# Patient Record
Sex: MALE | Race: WHITE | Employment: UNEMPLOYED | URBAN - METROPOLITAN AREA
[De-identification: names, ages, dates, MRNs, and addresses within clinical notes are randomized per-mention and may not be internally consistent; named-entity substitution may affect disease eponyms.]

---

## 2017-02-08 ENCOUNTER — HOSPITAL ENCOUNTER (OUTPATIENT)
Age: 77
Discharge: HOME | End: 2017-02-08
Payer: MEDICARE

## 2017-02-08 LAB
INR BLD: 4.1
PROTHROMBIN TIME: 46.3 SEC (ref 9.7–12)

## 2017-02-08 PROCEDURE — 85610 PROTHROMBIN TIME: CPT

## 2017-02-08 PROCEDURE — 36415 COLL VENOUS BLD VENIPUNCTURE: CPT

## 2017-02-17 ENCOUNTER — HOSPITAL ENCOUNTER (OUTPATIENT)
Age: 77
Discharge: HOME | End: 2017-02-17
Payer: MEDICARE

## 2017-02-17 LAB
INR BLD: 3.9
PROTHROMBIN TIME: 43.7 SEC (ref 9.7–12)

## 2017-02-17 PROCEDURE — 36415 COLL VENOUS BLD VENIPUNCTURE: CPT

## 2017-02-17 PROCEDURE — 85610 PROTHROMBIN TIME: CPT

## 2017-02-27 ENCOUNTER — HOSPITAL ENCOUNTER (OUTPATIENT)
Age: 77
Discharge: HOME | End: 2017-02-27
Payer: MEDICARE

## 2017-02-27 LAB
INR BLD: 1.9
PROTHROMBIN TIME: 21.1 SEC (ref 9.7–12)

## 2017-02-27 PROCEDURE — 36415 COLL VENOUS BLD VENIPUNCTURE: CPT

## 2017-02-27 PROCEDURE — 85610 PROTHROMBIN TIME: CPT

## 2017-03-13 ENCOUNTER — HOSPITAL ENCOUNTER (OUTPATIENT)
Age: 77
Discharge: HOME | End: 2017-03-13
Payer: MEDICARE

## 2017-03-13 LAB
INR BLD: 3.8
PROTHROMBIN TIME: 41.8 SEC (ref 9.7–12)

## 2017-03-13 PROCEDURE — 85610 PROTHROMBIN TIME: CPT

## 2017-03-13 PROCEDURE — 36415 COLL VENOUS BLD VENIPUNCTURE: CPT

## 2017-03-27 ENCOUNTER — HOSPITAL ENCOUNTER (OUTPATIENT)
Age: 77
Discharge: HOME | End: 2017-03-27
Payer: MEDICARE

## 2017-03-27 LAB
INR BLD: 3
PROTHROMBIN TIME: 32.6 SEC (ref 9.7–12)

## 2017-03-27 PROCEDURE — 36415 COLL VENOUS BLD VENIPUNCTURE: CPT

## 2017-03-27 PROCEDURE — 85610 PROTHROMBIN TIME: CPT

## 2017-04-25 ENCOUNTER — HOSPITAL ENCOUNTER (OUTPATIENT)
Age: 77
Discharge: HOME | End: 2017-04-25
Payer: MEDICARE

## 2017-04-25 LAB
ALBUMIN SERPL-MCNC: 4.4 G/DL (ref 3.5–5.2)
ALBUMIN/GLOBULIN RATIO: NORMAL (ref 1–2.5)
ALP BLD-CCNC: 100 U/L (ref 40–129)
ALT SERPL-CCNC: 22 U/L (ref 5–41)
ANION GAP SERPL CALCULATED.3IONS-SCNC: 11 MMOL/L (ref 9–17)
AST SERPL-CCNC: 22 U/L
BILIRUB SERPL-MCNC: 0.63 MG/DL (ref 0.3–1.2)
BILIRUBIN DIRECT: 0.14 MG/DL
BILIRUBIN, INDIRECT: 0.49 MG/DL (ref 0–1)
BUN BLDV-MCNC: 24 MG/DL (ref 8–23)
CHLORIDE BLD-SCNC: 107 MMOL/L (ref 98–107)
CHOLESTEROL/HDL RATIO: 2.8
CHOLESTEROL: 132 MG/DL
CO2: 27 MMOL/L (ref 20–31)
CREAT SERPL-MCNC: 0.74 MG/DL (ref 0.7–1.2)
ESTIMATED AVERAGE GLUCOSE: 97 MG/DL
GFR AFRICAN AMERICAN: >60 ML/MIN
GFR NON-AFRICAN AMERICAN: >60 ML/MIN
GFR SERPL CREATININE-BSD FRML MDRD: ABNORMAL ML/MIN/{1.73_M2}
GFR SERPL CREATININE-BSD FRML MDRD: ABNORMAL ML/MIN/{1.73_M2}
GLOBULIN: NORMAL G/DL (ref 1.5–3.8)
HBA1C MFR BLD: 5 % (ref 4–6)
HCT VFR BLD CALC: 47.6 % (ref 41–53)
HDLC SERPL-MCNC: 48 MG/DL
HEMOGLOBIN: 15.2 G/DL (ref 13.5–17.5)
INR BLD: 2.8
LDL CHOLESTEROL: 63 MG/DL (ref 0–130)
MCH RBC QN AUTO: 27.7 PG (ref 26–34)
MCHC RBC AUTO-ENTMCNC: 31.9 G/DL (ref 31–37)
MCV RBC AUTO: 86.8 FL (ref 80–100)
PDW BLD-RTO: 13.3 % (ref 11.5–14.9)
PLATELET # BLD: 163 K/UL (ref 150–450)
PMV BLD AUTO: 8.5 FL (ref 6–12)
POTASSIUM SERPL-SCNC: 4.6 MMOL/L (ref 3.7–5.3)
PROSTATE SPECIFIC ANTIGEN: 1.9 UG/L
PROTHROMBIN TIME: 31.9 SEC (ref 9.7–12)
RBC # BLD: 5.48 M/UL (ref 4.5–5.9)
SODIUM BLD-SCNC: 145 MMOL/L (ref 135–144)
TOTAL PROTEIN: 7.7 G/DL (ref 6.4–8.3)
TRIGL SERPL-MCNC: 104 MG/DL
VLDLC SERPL CALC-MCNC: NORMAL MG/DL (ref 1–30)
WBC # BLD: 6.3 K/UL (ref 3.5–11)

## 2017-04-25 PROCEDURE — 80051 ELECTROLYTE PANEL: CPT

## 2017-04-25 PROCEDURE — 36415 COLL VENOUS BLD VENIPUNCTURE: CPT

## 2017-04-25 PROCEDURE — 82565 ASSAY OF CREATININE: CPT

## 2017-04-25 PROCEDURE — 85610 PROTHROMBIN TIME: CPT

## 2017-04-25 PROCEDURE — 85027 COMPLETE CBC AUTOMATED: CPT

## 2017-04-25 PROCEDURE — 80061 LIPID PANEL: CPT

## 2017-04-25 PROCEDURE — 84520 ASSAY OF UREA NITROGEN: CPT

## 2017-04-25 PROCEDURE — 83036 HEMOGLOBIN GLYCOSYLATED A1C: CPT

## 2017-04-25 PROCEDURE — 80076 HEPATIC FUNCTION PANEL: CPT

## 2017-04-25 PROCEDURE — G0103 PSA SCREENING: HCPCS

## 2017-05-23 ENCOUNTER — HOSPITAL ENCOUNTER (OUTPATIENT)
Age: 77
Discharge: HOME | End: 2017-05-23
Payer: MEDICARE

## 2017-05-23 ENCOUNTER — HOSPITAL ENCOUNTER (OUTPATIENT)
Dept: GENERAL RADIOLOGY | Age: 77
Discharge: HOME | End: 2017-05-23
Payer: MEDICARE

## 2017-05-23 DIAGNOSIS — M25.552 LEFT HIP PAIN: ICD-10-CM

## 2017-05-23 LAB
INR BLD: 2.3
PROTHROMBIN TIME: 26.1 SEC (ref 9.7–12)

## 2017-05-23 PROCEDURE — 73502 X-RAY EXAM HIP UNI 2-3 VIEWS: CPT

## 2017-05-23 PROCEDURE — 36415 COLL VENOUS BLD VENIPUNCTURE: CPT

## 2017-05-23 PROCEDURE — 85610 PROTHROMBIN TIME: CPT

## 2017-06-20 ENCOUNTER — HOSPITAL ENCOUNTER (OUTPATIENT)
Age: 77
Discharge: HOME | End: 2017-06-20
Payer: MEDICARE

## 2017-06-20 LAB
INR BLD: 1.6
PROTHROMBIN TIME: 17 SEC (ref 9.7–12)

## 2017-06-20 PROCEDURE — 85610 PROTHROMBIN TIME: CPT

## 2017-06-20 PROCEDURE — 36415 COLL VENOUS BLD VENIPUNCTURE: CPT

## 2017-07-05 ENCOUNTER — HOSPITAL ENCOUNTER (OUTPATIENT)
Age: 77
Discharge: HOME | End: 2017-07-05
Payer: MEDICARE

## 2017-07-05 LAB
INR BLD: 2.1
PROTHROMBIN TIME: 23.4 SEC (ref 9.7–12)

## 2017-07-05 PROCEDURE — 36415 COLL VENOUS BLD VENIPUNCTURE: CPT

## 2017-07-05 PROCEDURE — 85610 PROTHROMBIN TIME: CPT

## 2017-08-02 ENCOUNTER — HOSPITAL ENCOUNTER (OUTPATIENT)
Age: 77
Discharge: HOME | End: 2017-08-02
Payer: MEDICARE

## 2017-08-02 LAB
ALBUMIN SERPL-MCNC: 4.5 G/DL (ref 3.5–5.2)
ALBUMIN/GLOBULIN RATIO: NORMAL (ref 1–2.5)
ALP BLD-CCNC: 80 U/L (ref 40–129)
ALT SERPL-CCNC: 21 U/L (ref 5–41)
ANION GAP SERPL CALCULATED.3IONS-SCNC: 12 MMOL/L (ref 9–17)
AST SERPL-CCNC: 21 U/L
BILIRUB SERPL-MCNC: 0.67 MG/DL (ref 0.3–1.2)
BILIRUBIN DIRECT: 0.14 MG/DL
BILIRUBIN, INDIRECT: 0.53 MG/DL (ref 0–1)
BUN BLDV-MCNC: 23 MG/DL (ref 8–23)
CHLORIDE BLD-SCNC: 104 MMOL/L (ref 98–107)
CHOLESTEROL/HDL RATIO: 2.7
CHOLESTEROL: 125 MG/DL
CO2: 26 MMOL/L (ref 20–31)
CREAT SERPL-MCNC: 0.75 MG/DL (ref 0.7–1.2)
ESTIMATED AVERAGE GLUCOSE: 114 MG/DL
GFR AFRICAN AMERICAN: >60 ML/MIN
GFR NON-AFRICAN AMERICAN: >60 ML/MIN
GFR SERPL CREATININE-BSD FRML MDRD: NORMAL ML/MIN/{1.73_M2}
GFR SERPL CREATININE-BSD FRML MDRD: NORMAL ML/MIN/{1.73_M2}
GLOBULIN: NORMAL G/DL (ref 1.5–3.8)
GLUCOSE BLD-MCNC: 95 MG/DL (ref 70–99)
HBA1C MFR BLD: 5.6 % (ref 4–6)
HCT VFR BLD CALC: 48.2 % (ref 41–53)
HDLC SERPL-MCNC: 46 MG/DL
HEMOGLOBIN: 15.8 G/DL (ref 13.5–17.5)
INR BLD: 2
LDL CHOLESTEROL: 55 MG/DL (ref 0–130)
MCH RBC QN AUTO: 29.1 PG (ref 26–34)
MCHC RBC AUTO-ENTMCNC: 32.7 G/DL (ref 31–37)
MCV RBC AUTO: 89.2 FL (ref 80–100)
PDW BLD-RTO: 14.9 % (ref 11.5–14.9)
PLATELET # BLD: 153 K/UL (ref 150–450)
PMV BLD AUTO: 8.5 FL (ref 6–12)
POTASSIUM SERPL-SCNC: 4.6 MMOL/L (ref 3.7–5.3)
PROTHROMBIN TIME: 22 SEC (ref 9.7–12)
RBC # BLD: 5.41 M/UL (ref 4.5–5.9)
SODIUM BLD-SCNC: 142 MMOL/L (ref 135–144)
TOTAL PROTEIN: 7.6 G/DL (ref 6.4–8.3)
TRIGL SERPL-MCNC: 121 MG/DL
VLDLC SERPL CALC-MCNC: NORMAL MG/DL (ref 1–30)
WBC # BLD: 7.5 K/UL (ref 3.5–11)

## 2017-08-02 PROCEDURE — 82947 ASSAY GLUCOSE BLOOD QUANT: CPT

## 2017-08-02 PROCEDURE — 85027 COMPLETE CBC AUTOMATED: CPT

## 2017-08-02 PROCEDURE — 82565 ASSAY OF CREATININE: CPT

## 2017-08-02 PROCEDURE — 84520 ASSAY OF UREA NITROGEN: CPT

## 2017-08-02 PROCEDURE — 80061 LIPID PANEL: CPT

## 2017-08-02 PROCEDURE — 85610 PROTHROMBIN TIME: CPT

## 2017-08-02 PROCEDURE — 80076 HEPATIC FUNCTION PANEL: CPT

## 2017-08-02 PROCEDURE — 36415 COLL VENOUS BLD VENIPUNCTURE: CPT

## 2017-08-02 PROCEDURE — 80051 ELECTROLYTE PANEL: CPT

## 2017-08-02 PROCEDURE — 83036 HEMOGLOBIN GLYCOSYLATED A1C: CPT

## 2017-08-31 ENCOUNTER — HOSPITAL ENCOUNTER (OUTPATIENT)
Age: 77
Discharge: HOME | End: 2017-08-31
Payer: MEDICARE

## 2017-08-31 LAB
INR BLD: 2
PROTHROMBIN TIME: 22.1 SEC (ref 9.7–12)

## 2017-08-31 PROCEDURE — 85610 PROTHROMBIN TIME: CPT

## 2017-08-31 PROCEDURE — 36415 COLL VENOUS BLD VENIPUNCTURE: CPT

## 2017-09-29 ENCOUNTER — HOSPITAL ENCOUNTER (OUTPATIENT)
Age: 77
Discharge: HOME | End: 2017-09-29
Payer: MEDICARE

## 2017-09-29 LAB
INR BLD: 2.2
PROTHROMBIN TIME: 24.8 SEC (ref 9.7–12)

## 2017-09-29 PROCEDURE — 36415 COLL VENOUS BLD VENIPUNCTURE: CPT

## 2017-09-29 PROCEDURE — 85610 PROTHROMBIN TIME: CPT

## 2017-10-30 ENCOUNTER — HOSPITAL ENCOUNTER (OUTPATIENT)
Age: 77
Discharge: HOME | End: 2017-10-30
Payer: MEDICARE

## 2017-10-30 LAB
ALBUMIN SERPL-MCNC: 4.5 G/DL (ref 3.5–5.2)
ALBUMIN/GLOBULIN RATIO: NORMAL (ref 1–2.5)
ALP BLD-CCNC: 76 U/L (ref 40–129)
ALT SERPL-CCNC: 19 U/L (ref 5–41)
ANION GAP SERPL CALCULATED.3IONS-SCNC: 10 MMOL/L (ref 9–17)
AST SERPL-CCNC: 21 U/L
BILIRUB SERPL-MCNC: 0.6 MG/DL (ref 0.3–1.2)
BILIRUBIN DIRECT: 0.14 MG/DL
BILIRUBIN, INDIRECT: 0.46 MG/DL (ref 0–1)
BUN BLDV-MCNC: 21 MG/DL (ref 8–23)
CHLORIDE BLD-SCNC: 104 MMOL/L (ref 98–107)
CHOLESTEROL/HDL RATIO: 2.7
CHOLESTEROL: 135 MG/DL
CO2: 28 MMOL/L (ref 20–31)
CREAT SERPL-MCNC: 0.79 MG/DL (ref 0.7–1.2)
ESTIMATED AVERAGE GLUCOSE: 100 MG/DL
GFR AFRICAN AMERICAN: >60 ML/MIN
GFR NON-AFRICAN AMERICAN: >60 ML/MIN
GFR SERPL CREATININE-BSD FRML MDRD: NORMAL ML/MIN/{1.73_M2}
GFR SERPL CREATININE-BSD FRML MDRD: NORMAL ML/MIN/{1.73_M2}
GLOBULIN: NORMAL G/DL (ref 1.5–3.8)
GLUCOSE BLD-MCNC: 89 MG/DL (ref 70–99)
HBA1C MFR BLD: 5.1 % (ref 4–6)
HCT VFR BLD CALC: 48.2 % (ref 41–53)
HDLC SERPL-MCNC: 50 MG/DL
HEMOGLOBIN: 16.1 G/DL (ref 13.5–17.5)
INR BLD: 2.5
LDL CHOLESTEROL: 65 MG/DL (ref 0–130)
MCH RBC QN AUTO: 30.7 PG (ref 26–34)
MCHC RBC AUTO-ENTMCNC: 33.4 G/DL (ref 31–37)
MCV RBC AUTO: 92 FL (ref 80–100)
PDW BLD-RTO: 13.3 % (ref 11.5–14.9)
PLATELET # BLD: 157 K/UL (ref 150–450)
PMV BLD AUTO: 8.1 FL (ref 6–12)
POTASSIUM SERPL-SCNC: 4.8 MMOL/L (ref 3.7–5.3)
PROTHROMBIN TIME: 28.4 SEC (ref 9.7–12)
RBC # BLD: 5.24 M/UL (ref 4.5–5.9)
SODIUM BLD-SCNC: 142 MMOL/L (ref 135–144)
TOTAL PROTEIN: 7.7 G/DL (ref 6.4–8.3)
TRIGL SERPL-MCNC: 99 MG/DL
VLDLC SERPL CALC-MCNC: NORMAL MG/DL (ref 1–30)
WBC # BLD: 7 K/UL (ref 3.5–11)

## 2017-10-30 PROCEDURE — 82947 ASSAY GLUCOSE BLOOD QUANT: CPT

## 2017-10-30 PROCEDURE — 85610 PROTHROMBIN TIME: CPT

## 2017-10-30 PROCEDURE — 80061 LIPID PANEL: CPT

## 2017-10-30 PROCEDURE — 80076 HEPATIC FUNCTION PANEL: CPT

## 2017-10-30 PROCEDURE — 85027 COMPLETE CBC AUTOMATED: CPT

## 2017-10-30 PROCEDURE — 84520 ASSAY OF UREA NITROGEN: CPT

## 2017-10-30 PROCEDURE — 80051 ELECTROLYTE PANEL: CPT

## 2017-10-30 PROCEDURE — 82565 ASSAY OF CREATININE: CPT

## 2017-10-30 PROCEDURE — 36415 COLL VENOUS BLD VENIPUNCTURE: CPT

## 2017-10-30 PROCEDURE — 83036 HEMOGLOBIN GLYCOSYLATED A1C: CPT

## 2017-12-04 ENCOUNTER — HOSPITAL ENCOUNTER (OUTPATIENT)
Age: 77
Discharge: HOME | End: 2017-12-04
Payer: MEDICARE

## 2017-12-04 LAB
INR BLD: 1.8
PROTHROMBIN TIME: 19.9 SEC (ref 9.7–12)

## 2017-12-04 PROCEDURE — 85610 PROTHROMBIN TIME: CPT

## 2017-12-04 PROCEDURE — 36415 COLL VENOUS BLD VENIPUNCTURE: CPT

## 2018-01-03 ENCOUNTER — HOSPITAL ENCOUNTER (OUTPATIENT)
Age: 78
Discharge: HOME | End: 2018-01-03
Payer: MEDICARE

## 2018-01-03 LAB
INR BLD: 1.8
PROTHROMBIN TIME: 19.6 SEC (ref 9.7–12)

## 2018-01-03 PROCEDURE — 36415 COLL VENOUS BLD VENIPUNCTURE: CPT

## 2018-01-03 PROCEDURE — 85610 PROTHROMBIN TIME: CPT

## 2018-01-29 ENCOUNTER — HOSPITAL ENCOUNTER (OUTPATIENT)
Age: 78
Discharge: HOME | End: 2018-01-29
Payer: MEDICARE

## 2018-01-29 LAB
ANION GAP SERPL CALCULATED.3IONS-SCNC: 9 MMOL/L (ref 9–17)
BUN BLDV-MCNC: 16 MG/DL (ref 8–23)
CHLORIDE BLD-SCNC: 102 MMOL/L (ref 98–107)
CHOLESTEROL/HDL RATIO: 2.6
CHOLESTEROL: 116 MG/DL
CO2: 29 MMOL/L (ref 20–31)
CREAT SERPL-MCNC: 0.89 MG/DL (ref 0.7–1.2)
ESTIMATED AVERAGE GLUCOSE: 105 MG/DL
GFR AFRICAN AMERICAN: >60 ML/MIN
GFR NON-AFRICAN AMERICAN: >60 ML/MIN
GFR SERPL CREATININE-BSD FRML MDRD: NORMAL ML/MIN/{1.73_M2}
GFR SERPL CREATININE-BSD FRML MDRD: NORMAL ML/MIN/{1.73_M2}
GLUCOSE BLD-MCNC: 96 MG/DL (ref 70–99)
HBA1C MFR BLD: 5.3 % (ref 4–6)
HCT VFR BLD CALC: 47.1 % (ref 41–53)
HDLC SERPL-MCNC: 45 MG/DL
HEMOGLOBIN: 15.6 G/DL (ref 13.5–17.5)
INR BLD: 2.3
LDL CHOLESTEROL: 51 MG/DL (ref 0–130)
MCH RBC QN AUTO: 30.1 PG (ref 26–34)
MCHC RBC AUTO-ENTMCNC: 33 G/DL (ref 31–37)
MCV RBC AUTO: 91.2 FL (ref 80–100)
NRBC AUTOMATED: NORMAL PER 100 WBC
PDW BLD-RTO: 12.7 % (ref 11.5–14.9)
PLATELET # BLD: 167 K/UL (ref 150–450)
PMV BLD AUTO: 8.6 FL (ref 6–12)
POTASSIUM SERPL-SCNC: 4.8 MMOL/L (ref 3.7–5.3)
PROTHROMBIN TIME: 23.3 SEC (ref 9.7–12)
RBC # BLD: 5.16 M/UL (ref 4.5–5.9)
SODIUM BLD-SCNC: 140 MMOL/L (ref 135–144)
TRIGL SERPL-MCNC: 100 MG/DL
VLDLC SERPL CALC-MCNC: NORMAL MG/DL (ref 1–30)
WBC # BLD: 6.2 K/UL (ref 3.5–11)

## 2018-01-29 PROCEDURE — 80061 LIPID PANEL: CPT

## 2018-01-29 PROCEDURE — 82565 ASSAY OF CREATININE: CPT

## 2018-01-29 PROCEDURE — 36415 COLL VENOUS BLD VENIPUNCTURE: CPT

## 2018-01-29 PROCEDURE — 83036 HEMOGLOBIN GLYCOSYLATED A1C: CPT

## 2018-01-29 PROCEDURE — 80051 ELECTROLYTE PANEL: CPT

## 2018-01-29 PROCEDURE — 85610 PROTHROMBIN TIME: CPT

## 2018-01-29 PROCEDURE — 84520 ASSAY OF UREA NITROGEN: CPT

## 2018-01-29 PROCEDURE — 82947 ASSAY GLUCOSE BLOOD QUANT: CPT

## 2018-01-29 PROCEDURE — 85027 COMPLETE CBC AUTOMATED: CPT

## 2018-02-26 ENCOUNTER — HOSPITAL ENCOUNTER (OUTPATIENT)
Age: 78
Discharge: HOME | End: 2018-02-26
Payer: MEDICARE

## 2018-02-26 LAB
INR BLD: 2.4
PROTHROMBIN TIME: 24.4 SEC (ref 9.7–12)

## 2018-02-26 PROCEDURE — 36415 COLL VENOUS BLD VENIPUNCTURE: CPT

## 2018-02-26 PROCEDURE — 85610 PROTHROMBIN TIME: CPT

## 2018-03-28 ENCOUNTER — HOSPITAL ENCOUNTER (OUTPATIENT)
Age: 78
Discharge: HOME | End: 2018-03-28
Payer: MEDICARE

## 2018-03-28 LAB
INR BLD: 2.3
PROTHROMBIN TIME: 23.3 SEC (ref 9.7–12)

## 2018-03-28 PROCEDURE — 36415 COLL VENOUS BLD VENIPUNCTURE: CPT

## 2018-03-28 PROCEDURE — 85610 PROTHROMBIN TIME: CPT

## 2018-05-02 ENCOUNTER — HOSPITAL ENCOUNTER (OUTPATIENT)
Age: 78
Discharge: HOME | End: 2018-05-02
Payer: MEDICARE

## 2018-05-02 LAB
ALBUMIN SERPL-MCNC: 4.6 G/DL (ref 3.5–5.2)
ALBUMIN/GLOBULIN RATIO: NORMAL (ref 1–2.5)
ALP BLD-CCNC: 98 U/L (ref 40–129)
ALT SERPL-CCNC: 18 U/L (ref 5–41)
ANION GAP SERPL CALCULATED.3IONS-SCNC: 9 MMOL/L (ref 9–17)
AST SERPL-CCNC: 21 U/L
BILIRUB SERPL-MCNC: 0.83 MG/DL (ref 0.3–1.2)
BILIRUBIN DIRECT: 0.2 MG/DL
BILIRUBIN, INDIRECT: 0.63 MG/DL (ref 0–1)
BUN BLDV-MCNC: 28 MG/DL (ref 8–23)
CHLORIDE BLD-SCNC: 104 MMOL/L (ref 98–107)
CHOLESTEROL/HDL RATIO: 2.7
CHOLESTEROL: 128 MG/DL
CO2: 27 MMOL/L (ref 20–31)
CREAT SERPL-MCNC: 0.78 MG/DL (ref 0.7–1.2)
ESTIMATED AVERAGE GLUCOSE: 103 MG/DL
GFR AFRICAN AMERICAN: >60 ML/MIN
GFR NON-AFRICAN AMERICAN: >60 ML/MIN
GFR SERPL CREATININE-BSD FRML MDRD: NORMAL ML/MIN/{1.73_M2}
GFR SERPL CREATININE-BSD FRML MDRD: NORMAL ML/MIN/{1.73_M2}
GLOBULIN: NORMAL G/DL (ref 1.5–3.8)
GLUCOSE BLD-MCNC: 93 MG/DL (ref 70–99)
HBA1C MFR BLD: 5.2 % (ref 4–6)
HCT VFR BLD CALC: 46.6 % (ref 41–53)
HDLC SERPL-MCNC: 47 MG/DL
HEMOGLOBIN: 15.5 G/DL (ref 13.5–17.5)
INR BLD: 2.9
LDL CHOLESTEROL: 64 MG/DL (ref 0–130)
MCH RBC QN AUTO: 30.3 PG (ref 26–34)
MCHC RBC AUTO-ENTMCNC: 33.2 G/DL (ref 31–37)
MCV RBC AUTO: 91.1 FL (ref 80–100)
NRBC AUTOMATED: NORMAL PER 100 WBC
PDW BLD-RTO: 13.4 % (ref 11.5–14.9)
PLATELET # BLD: 165 K/UL (ref 150–450)
PMV BLD AUTO: 8.8 FL (ref 6–12)
POTASSIUM SERPL-SCNC: 4.5 MMOL/L (ref 3.7–5.3)
PROSTATE SPECIFIC ANTIGEN: 1.68 UG/L
PROTHROMBIN TIME: 28.4 SEC (ref 9.7–12)
RBC # BLD: 5.12 M/UL (ref 4.5–5.9)
SODIUM BLD-SCNC: 140 MMOL/L (ref 135–144)
TOTAL PROTEIN: 7.8 G/DL (ref 6.4–8.3)
TRIGL SERPL-MCNC: 86 MG/DL
VLDLC SERPL CALC-MCNC: NORMAL MG/DL (ref 1–30)
WBC # BLD: 5.8 K/UL (ref 3.5–11)

## 2018-05-02 PROCEDURE — 84153 ASSAY OF PSA TOTAL: CPT

## 2018-05-02 PROCEDURE — 80076 HEPATIC FUNCTION PANEL: CPT

## 2018-05-02 PROCEDURE — 84520 ASSAY OF UREA NITROGEN: CPT

## 2018-05-02 PROCEDURE — 82947 ASSAY GLUCOSE BLOOD QUANT: CPT

## 2018-05-02 PROCEDURE — 80051 ELECTROLYTE PANEL: CPT

## 2018-05-02 PROCEDURE — 80061 LIPID PANEL: CPT

## 2018-05-02 PROCEDURE — 85610 PROTHROMBIN TIME: CPT

## 2018-05-02 PROCEDURE — 36415 COLL VENOUS BLD VENIPUNCTURE: CPT

## 2018-05-02 PROCEDURE — 82565 ASSAY OF CREATININE: CPT

## 2018-05-02 PROCEDURE — 85027 COMPLETE CBC AUTOMATED: CPT

## 2018-05-02 PROCEDURE — 83036 HEMOGLOBIN GLYCOSYLATED A1C: CPT

## 2018-06-06 ENCOUNTER — HOSPITAL ENCOUNTER (OUTPATIENT)
Age: 78
Discharge: HOME | End: 2018-06-06
Payer: MEDICARE

## 2018-06-06 LAB
INR BLD: 2.4
PROTHROMBIN TIME: 23.9 SEC (ref 9.7–12)

## 2018-06-06 PROCEDURE — 36415 COLL VENOUS BLD VENIPUNCTURE: CPT

## 2018-06-06 PROCEDURE — 85610 PROTHROMBIN TIME: CPT

## 2018-07-11 ENCOUNTER — HOSPITAL ENCOUNTER (OUTPATIENT)
Age: 78
Discharge: HOME | End: 2018-07-11
Payer: MEDICARE

## 2018-07-11 LAB
INR BLD: 2.2
PROTHROMBIN TIME: 21.9 SEC (ref 9.7–12)

## 2018-07-11 PROCEDURE — 85610 PROTHROMBIN TIME: CPT

## 2018-07-11 PROCEDURE — 36415 COLL VENOUS BLD VENIPUNCTURE: CPT

## 2018-07-13 ENCOUNTER — HOSPITAL ENCOUNTER (OUTPATIENT)
Dept: CT IMAGING | Age: 78
Discharge: HOME | End: 2018-07-15
Payer: MEDICARE

## 2018-07-13 ENCOUNTER — HOSPITAL ENCOUNTER (OUTPATIENT)
Dept: VASCULAR LAB | Age: 78
Discharge: HOME | End: 2018-07-13
Payer: MEDICARE

## 2018-07-13 DIAGNOSIS — H53.9 VISION CHANGES: ICD-10-CM

## 2018-07-13 DIAGNOSIS — R42 DIZZINESS: ICD-10-CM

## 2018-07-13 LAB
BUN BLDV-MCNC: 26 MG/DL (ref 8–23)
CREAT SERPL-MCNC: 0.77 MG/DL (ref 0.7–1.2)
GFR AFRICAN AMERICAN: >60 ML/MIN
GFR NON-AFRICAN AMERICAN: >60 ML/MIN
GFR SERPL CREATININE-BSD FRML MDRD: ABNORMAL ML/MIN/{1.73_M2}
GFR SERPL CREATININE-BSD FRML MDRD: ABNORMAL ML/MIN/{1.73_M2}

## 2018-07-13 PROCEDURE — 84520 ASSAY OF UREA NITROGEN: CPT

## 2018-07-13 PROCEDURE — 2580000003 HC RX 258: Performed by: PHYSICIAN ASSISTANT

## 2018-07-13 PROCEDURE — 93880 EXTRACRANIAL BILAT STUDY: CPT

## 2018-07-13 PROCEDURE — 6360000004 HC RX CONTRAST MEDICATION: Performed by: PHYSICIAN ASSISTANT

## 2018-07-13 PROCEDURE — 70470 CT HEAD/BRAIN W/O & W/DYE: CPT

## 2018-07-13 PROCEDURE — 36415 COLL VENOUS BLD VENIPUNCTURE: CPT

## 2018-07-13 PROCEDURE — 82565 ASSAY OF CREATININE: CPT

## 2018-07-13 RX ORDER — 0.9 % SODIUM CHLORIDE 0.9 %
80 INTRAVENOUS SOLUTION INTRAVENOUS ONCE
Status: COMPLETED | OUTPATIENT
Start: 2018-07-13 | End: 2018-07-13

## 2018-07-13 RX ORDER — SODIUM CHLORIDE 0.9 % (FLUSH) 0.9 %
10 SYRINGE (ML) INJECTION PRN
Status: DISCONTINUED | OUTPATIENT
Start: 2018-07-13 | End: 2018-07-16 | Stop reason: HOSPADM

## 2018-07-13 RX ADMIN — SODIUM CHLORIDE 80 ML: 9 INJECTION, SOLUTION INTRAVENOUS at 14:31

## 2018-07-13 RX ADMIN — Medication 10 ML: at 14:31

## 2018-07-13 RX ADMIN — IOPAMIDOL 75 ML: 755 INJECTION, SOLUTION INTRAVENOUS at 14:31

## 2018-08-15 ENCOUNTER — HOSPITAL ENCOUNTER (OUTPATIENT)
Age: 78
Discharge: HOME | End: 2018-08-15
Payer: MEDICARE

## 2018-08-15 LAB
INR BLD: 2.5
PROTHROMBIN TIME: 24.9 SEC (ref 9.7–12)

## 2018-08-15 PROCEDURE — 85610 PROTHROMBIN TIME: CPT

## 2018-08-15 PROCEDURE — 36415 COLL VENOUS BLD VENIPUNCTURE: CPT

## 2018-09-28 ENCOUNTER — HOSPITAL ENCOUNTER (OUTPATIENT)
Age: 78
Discharge: HOME | End: 2018-09-28
Payer: MEDICARE

## 2018-09-28 LAB
INR BLD: 1.9
PROTHROMBIN TIME: 18.9 SEC (ref 9.7–12)

## 2018-09-28 PROCEDURE — 36415 COLL VENOUS BLD VENIPUNCTURE: CPT

## 2018-09-28 PROCEDURE — 85610 PROTHROMBIN TIME: CPT

## 2018-10-29 ENCOUNTER — HOSPITAL ENCOUNTER (OUTPATIENT)
Age: 78
Discharge: HOME | End: 2018-10-29
Payer: MEDICARE

## 2018-10-29 LAB
ALBUMIN SERPL-MCNC: 4.6 G/DL (ref 3.5–5.2)
ALBUMIN/GLOBULIN RATIO: NORMAL (ref 1–2.5)
ALP BLD-CCNC: 85 U/L (ref 40–129)
ALT SERPL-CCNC: 18 U/L (ref 5–41)
ANION GAP SERPL CALCULATED.3IONS-SCNC: 9 MMOL/L (ref 9–17)
AST SERPL-CCNC: 19 U/L
BILIRUB SERPL-MCNC: 0.74 MG/DL (ref 0.3–1.2)
BILIRUBIN DIRECT: 0.16 MG/DL
BILIRUBIN, INDIRECT: 0.58 MG/DL (ref 0–1)
BUN BLDV-MCNC: 23 MG/DL (ref 8–23)
CHLORIDE BLD-SCNC: 106 MMOL/L (ref 98–107)
CHOLESTEROL/HDL RATIO: 3.1
CHOLESTEROL: 145 MG/DL
CO2: 29 MMOL/L (ref 20–31)
CREAT SERPL-MCNC: 0.89 MG/DL (ref 0.7–1.2)
ESTIMATED AVERAGE GLUCOSE: 103 MG/DL
GFR AFRICAN AMERICAN: >60 ML/MIN
GFR NON-AFRICAN AMERICAN: >60 ML/MIN
GFR SERPL CREATININE-BSD FRML MDRD: NORMAL ML/MIN/{1.73_M2}
GFR SERPL CREATININE-BSD FRML MDRD: NORMAL ML/MIN/{1.73_M2}
GLOBULIN: NORMAL G/DL (ref 1.5–3.8)
GLUCOSE FASTING: 97 MG/DL (ref 70–99)
HBA1C MFR BLD: 5.2 % (ref 4–6)
HCT VFR BLD CALC: 47.6 % (ref 41–53)
HDLC SERPL-MCNC: 47 MG/DL
HEMOGLOBIN: 15.7 G/DL (ref 13.5–17.5)
INR BLD: 2.1
LDL CHOLESTEROL: 71 MG/DL (ref 0–130)
MCH RBC QN AUTO: 30.2 PG (ref 26–34)
MCHC RBC AUTO-ENTMCNC: 32.9 G/DL (ref 31–37)
MCV RBC AUTO: 91.7 FL (ref 80–100)
NRBC AUTOMATED: NORMAL PER 100 WBC
PDW BLD-RTO: 13.6 % (ref 11.5–14.9)
PLATELET # BLD: 152 K/UL (ref 150–450)
PMV BLD AUTO: 8.4 FL (ref 6–12)
POTASSIUM SERPL-SCNC: 4.8 MMOL/L (ref 3.7–5.3)
PROTHROMBIN TIME: 21.4 SEC (ref 9.7–12)
RBC # BLD: 5.19 M/UL (ref 4.5–5.9)
SODIUM BLD-SCNC: 144 MMOL/L (ref 135–144)
TOTAL PROTEIN: 8 G/DL (ref 6.4–8.3)
TRIGL SERPL-MCNC: 137 MG/DL
VLDLC SERPL CALC-MCNC: NORMAL MG/DL (ref 1–30)
WBC # BLD: 6.3 K/UL (ref 3.5–11)

## 2018-10-29 PROCEDURE — 83036 HEMOGLOBIN GLYCOSYLATED A1C: CPT

## 2018-10-29 PROCEDURE — 85610 PROTHROMBIN TIME: CPT

## 2018-10-29 PROCEDURE — 82947 ASSAY GLUCOSE BLOOD QUANT: CPT

## 2018-10-29 PROCEDURE — 82565 ASSAY OF CREATININE: CPT

## 2018-10-29 PROCEDURE — 85027 COMPLETE CBC AUTOMATED: CPT

## 2018-10-29 PROCEDURE — 80051 ELECTROLYTE PANEL: CPT

## 2018-10-29 PROCEDURE — 84520 ASSAY OF UREA NITROGEN: CPT

## 2018-10-29 PROCEDURE — 36415 COLL VENOUS BLD VENIPUNCTURE: CPT

## 2018-10-29 PROCEDURE — 80076 HEPATIC FUNCTION PANEL: CPT

## 2018-10-29 PROCEDURE — 80061 LIPID PANEL: CPT

## 2018-11-26 ENCOUNTER — HOSPITAL ENCOUNTER (OUTPATIENT)
Age: 78
Discharge: HOME | End: 2018-11-26
Payer: MEDICARE

## 2018-11-26 LAB
INR BLD: 2.1
PROTHROMBIN TIME: 21.4 SEC (ref 9.7–12)

## 2018-11-26 PROCEDURE — 85610 PROTHROMBIN TIME: CPT

## 2018-11-26 PROCEDURE — 36415 COLL VENOUS BLD VENIPUNCTURE: CPT

## 2020-11-03 PROBLEM — M19.90 OSTEOARTHRITIS: Status: RESOLVED | Noted: 2020-11-03 | Resolved: 2020-11-03

## 2023-06-08 ENCOUNTER — APPOINTMENT (OUTPATIENT)
Dept: GENERAL RADIOLOGY | Age: 83
End: 2023-06-08
Payer: MEDICARE

## 2023-06-08 ENCOUNTER — HOSPITAL ENCOUNTER (INPATIENT)
Age: 83
LOS: 2 days | Discharge: HOME | End: 2023-06-10
Attending: EMERGENCY MEDICINE
Payer: MEDICARE

## 2023-06-08 DIAGNOSIS — Z45.010 PACEMAKER AT END OF BATTERY LIFE: Primary | ICD-10-CM

## 2023-06-08 PROBLEM — E87.6 HYPOKALEMIA: Status: ACTIVE | Noted: 2023-06-08

## 2023-06-08 PROBLEM — E83.51 HYPOCALCEMIA: Status: ACTIVE | Noted: 2023-06-08

## 2023-06-08 PROBLEM — I44.2 COMPLETE HEART BLOCK (HCC): Status: ACTIVE | Noted: 2023-03-13

## 2023-06-08 PROBLEM — T82.118A PACEMAKER FAILURE, INITIAL ENCOUNTER: Status: ACTIVE | Noted: 2023-06-08

## 2023-06-08 LAB
ALBUMIN SERPL-MCNC: 3.3 G/DL (ref 3.5–5.2)
ALBUMIN/GLOB SERPL: 1.5 {RATIO} (ref 1–2.5)
ALP SERPL-CCNC: 62 U/L (ref 40–129)
ALT SERPL-CCNC: 11 U/L (ref 5–41)
ANION GAP SERPL CALCULATED.3IONS-SCNC: 9 MMOL/L (ref 9–17)
AST SERPL-CCNC: 13 U/L
BASOPHILS # BLD: 0.04 K/UL (ref 0–0.2)
BASOPHILS NFR BLD: 1 % (ref 0–2)
BILIRUB SERPL-MCNC: 0.5 MG/DL (ref 0.3–1.2)
BUN SERPL-MCNC: 19 MG/DL (ref 8–23)
CALCIUM SERPL-MCNC: 7.4 MG/DL (ref 8.6–10.4)
CHLORIDE SERPL-SCNC: 112 MMOL/L (ref 98–107)
CO2 SERPL-SCNC: 22 MMOL/L (ref 20–31)
CREAT SERPL-MCNC: 0.55 MG/DL (ref 0.7–1.2)
EOSINOPHIL # BLD: 0.2 K/UL (ref 0–0.44)
EOSINOPHILS RELATIVE PERCENT: 2 % (ref 1–4)
ERYTHROCYTE [DISTWIDTH] IN BLOOD BY AUTOMATED COUNT: 13.4 % (ref 11.8–14.4)
GFR SERPL CREATININE-BSD FRML MDRD: >60 ML/MIN/1.73M2
GLUCOSE BLD-MCNC: 76 MG/DL (ref 75–110)
GLUCOSE SERPL-MCNC: 91 MG/DL (ref 70–99)
HCT VFR BLD AUTO: 48.1 % (ref 40.7–50.3)
HGB BLD-MCNC: 15.7 G/DL (ref 13–17)
IMM GRANULOCYTES # BLD AUTO: <0.03 K/UL (ref 0–0.3)
IMM GRANULOCYTES NFR BLD: 0 %
INR PPP: 2.8
LYMPHOCYTES # BLD: 36 % (ref 24–43)
LYMPHOCYTES NFR BLD: 3.05 K/UL (ref 1.1–3.7)
MAGNESIUM SERPL-MCNC: 2 MG/DL (ref 1.6–2.6)
MCH RBC QN AUTO: 30.9 PG (ref 25.2–33.5)
MCHC RBC AUTO-ENTMCNC: 32.6 G/DL (ref 28.4–34.8)
MCV RBC AUTO: 94.7 FL (ref 82.6–102.9)
MONOCYTES NFR BLD: 0.59 K/UL (ref 0.1–1.2)
MONOCYTES NFR BLD: 7 % (ref 3–12)
NEUTROPHILS NFR BLD: 54 % (ref 36–65)
NEUTS SEG NFR BLD: 4.52 K/UL (ref 1.5–8.1)
NRBC AUTOMATED: 0 PER 100 WBC
PLATELET # BLD AUTO: 142 K/UL (ref 138–453)
PMV BLD AUTO: 11.6 FL (ref 8.1–13.5)
POTASSIUM SERPL-SCNC: 3.4 MMOL/L (ref 3.7–5.3)
PROT SERPL-MCNC: 5.5 G/DL (ref 6.4–8.3)
PROTHROMBIN TIME: 29.1 SEC (ref 11.7–14.9)
RBC # BLD AUTO: 5.08 M/UL (ref 4.21–5.77)
REASON FOR REJECTION: NORMAL
SODIUM SERPL-SCNC: 143 MMOL/L (ref 135–144)
SPECIMEN SOURCE: NORMAL
TROPONIN I SERPL HS-MCNC: 10 NG/L (ref 0–22)
TROPONIN I SERPL HS-MCNC: 9 NG/L (ref 0–22)
WBC OTHER # BLD: 8.4 K/UL (ref 3.5–11.3)
ZZ NTE CLEAN UP: ORDERED TEST: NORMAL

## 2023-06-08 PROCEDURE — 99222 1ST HOSP IP/OBS MODERATE 55: CPT | Performed by: INTERNAL MEDICINE

## 2023-06-08 PROCEDURE — 6370000000 HC RX 637 (ALT 250 FOR IP): Performed by: HEALTH CARE PROVIDER

## 2023-06-08 PROCEDURE — 93005 ELECTROCARDIOGRAM TRACING: CPT | Performed by: INTERNAL MEDICINE

## 2023-06-08 PROCEDURE — 85610 PROTHROMBIN TIME: CPT

## 2023-06-08 PROCEDURE — 2580000003 HC RX 258: Performed by: NURSE PRACTITIONER

## 2023-06-08 PROCEDURE — 1200000000 HC SEMI PRIVATE

## 2023-06-08 PROCEDURE — 71045 X-RAY EXAM CHEST 1 VIEW: CPT

## 2023-06-08 PROCEDURE — 82947 ASSAY GLUCOSE BLOOD QUANT: CPT

## 2023-06-08 PROCEDURE — 84484 ASSAY OF TROPONIN QUANT: CPT

## 2023-06-08 PROCEDURE — 83735 ASSAY OF MAGNESIUM: CPT

## 2023-06-08 PROCEDURE — 99285 EMERGENCY DEPT VISIT HI MDM: CPT

## 2023-06-08 PROCEDURE — 85027 COMPLETE CBC AUTOMATED: CPT

## 2023-06-08 PROCEDURE — 80053 COMPREHEN METABOLIC PANEL: CPT

## 2023-06-08 PROCEDURE — 6370000000 HC RX 637 (ALT 250 FOR IP): Performed by: INTERNAL MEDICINE

## 2023-06-08 PROCEDURE — 93005 ELECTROCARDIOGRAM TRACING: CPT | Performed by: HEALTH CARE PROVIDER

## 2023-06-08 PROCEDURE — 6370000000 HC RX 637 (ALT 250 FOR IP): Performed by: STUDENT IN AN ORGANIZED HEALTH CARE EDUCATION/TRAINING PROGRAM

## 2023-06-08 PROCEDURE — 99253 IP/OBS CNSLTJ NEW/EST LOW 45: CPT | Performed by: INTERNAL MEDICINE

## 2023-06-08 RX ORDER — PHYTONADIONE 5 MG/1
5 TABLET ORAL ONCE
Status: DISCONTINUED | OUTPATIENT
Start: 2023-06-08 | End: 2023-06-08

## 2023-06-08 RX ORDER — ACETAMINOPHEN 650 MG/1
650 SUPPOSITORY RECTAL EVERY 6 HOURS PRN
Status: DISCONTINUED | OUTPATIENT
Start: 2023-06-08 | End: 2023-06-10 | Stop reason: HOSPADM

## 2023-06-08 RX ORDER — AMLODIPINE BESYLATE 5 MG/1
5 TABLET ORAL DAILY
Status: DISCONTINUED | OUTPATIENT
Start: 2023-06-08 | End: 2023-06-10 | Stop reason: HOSPADM

## 2023-06-08 RX ORDER — ATORVASTATIN CALCIUM 20 MG/1
20 TABLET, FILM COATED ORAL DAILY
Status: DISCONTINUED | OUTPATIENT
Start: 2023-06-08 | End: 2023-06-10 | Stop reason: HOSPADM

## 2023-06-08 RX ORDER — HYDROCODONE BITARTRATE AND ACETAMINOPHEN 5; 325 MG/1; MG/1
1 TABLET ORAL EVERY 6 HOURS PRN
Status: DISCONTINUED | OUTPATIENT
Start: 2023-06-08 | End: 2023-06-10 | Stop reason: HOSPADM

## 2023-06-08 RX ORDER — ONDANSETRON 4 MG/1
4 TABLET, ORALLY DISINTEGRATING ORAL EVERY 8 HOURS PRN
Status: DISCONTINUED | OUTPATIENT
Start: 2023-06-08 | End: 2023-06-10 | Stop reason: HOSPADM

## 2023-06-08 RX ORDER — SODIUM CHLORIDE 9 MG/ML
INJECTION, SOLUTION INTRAVENOUS PRN
Status: DISCONTINUED | OUTPATIENT
Start: 2023-06-08 | End: 2023-06-10 | Stop reason: HOSPADM

## 2023-06-08 RX ORDER — ALLOPURINOL 100 MG/1
100 TABLET ORAL DAILY
COMMUNITY

## 2023-06-08 RX ORDER — POLYETHYLENE GLYCOL 3350 17 G/17G
17 POWDER, FOR SOLUTION ORAL DAILY PRN
Status: DISCONTINUED | OUTPATIENT
Start: 2023-06-08 | End: 2023-06-10 | Stop reason: HOSPADM

## 2023-06-08 RX ORDER — COLCHICINE 0.6 MG/1
0.6 TABLET ORAL DAILY PRN
COMMUNITY

## 2023-06-08 RX ORDER — PHYTONADIONE 5 MG/1
10 TABLET ORAL ONCE
Status: COMPLETED | OUTPATIENT
Start: 2023-06-08 | End: 2023-06-08

## 2023-06-08 RX ORDER — WARFARIN SODIUM 3 MG/1
3 TABLET ORAL DAILY
Status: DISCONTINUED | OUTPATIENT
Start: 2023-06-08 | End: 2023-06-08

## 2023-06-08 RX ORDER — MECLIZINE HCL 12.5 MG/1
12.5 TABLET ORAL 3 TIMES DAILY PRN
COMMUNITY

## 2023-06-08 RX ORDER — TAMSULOSIN HYDROCHLORIDE 0.4 MG/1
0.4 CAPSULE ORAL DAILY
Status: DISCONTINUED | OUTPATIENT
Start: 2023-06-08 | End: 2023-06-10 | Stop reason: HOSPADM

## 2023-06-08 RX ORDER — POTASSIUM CHLORIDE 20 MEQ/1
40 TABLET, EXTENDED RELEASE ORAL ONCE
Status: COMPLETED | OUTPATIENT
Start: 2023-06-08 | End: 2023-06-08

## 2023-06-08 RX ORDER — POTASSIUM CHLORIDE 20 MEQ/1
40 TABLET, EXTENDED RELEASE ORAL PRN
Status: DISCONTINUED | OUTPATIENT
Start: 2023-06-08 | End: 2023-06-10 | Stop reason: HOSPADM

## 2023-06-08 RX ORDER — POTASSIUM CHLORIDE 7.45 MG/ML
10 INJECTION INTRAVENOUS PRN
Status: DISCONTINUED | OUTPATIENT
Start: 2023-06-08 | End: 2023-06-10 | Stop reason: HOSPADM

## 2023-06-08 RX ORDER — MAGNESIUM SULFATE 1 G/100ML
1000 INJECTION INTRAVENOUS PRN
Status: DISCONTINUED | OUTPATIENT
Start: 2023-06-08 | End: 2023-06-10 | Stop reason: HOSPADM

## 2023-06-08 RX ORDER — SODIUM CHLORIDE 0.9 % (FLUSH) 0.9 %
10 SYRINGE (ML) INJECTION PRN
Status: DISCONTINUED | OUTPATIENT
Start: 2023-06-08 | End: 2023-06-10 | Stop reason: HOSPADM

## 2023-06-08 RX ORDER — ONDANSETRON 2 MG/ML
4 INJECTION INTRAMUSCULAR; INTRAVENOUS EVERY 6 HOURS PRN
Status: DISCONTINUED | OUTPATIENT
Start: 2023-06-08 | End: 2023-06-10 | Stop reason: HOSPADM

## 2023-06-08 RX ORDER — SODIUM CHLORIDE 0.9 % (FLUSH) 0.9 %
5-40 SYRINGE (ML) INJECTION EVERY 12 HOURS SCHEDULED
Status: DISCONTINUED | OUTPATIENT
Start: 2023-06-08 | End: 2023-06-10 | Stop reason: HOSPADM

## 2023-06-08 RX ORDER — WARFARIN SODIUM 2 MG/1
2 TABLET ORAL DAILY
Status: DISCONTINUED | OUTPATIENT
Start: 2023-06-08 | End: 2023-06-08

## 2023-06-08 RX ORDER — ASPIRIN 81 MG/1
81 TABLET ORAL DAILY
Status: DISCONTINUED | OUTPATIENT
Start: 2023-06-08 | End: 2023-06-10 | Stop reason: HOSPADM

## 2023-06-08 RX ORDER — ACETAMINOPHEN 325 MG/1
650 TABLET ORAL EVERY 6 HOURS PRN
Status: DISCONTINUED | OUTPATIENT
Start: 2023-06-08 | End: 2023-06-10 | Stop reason: HOSPADM

## 2023-06-08 RX ADMIN — SODIUM CHLORIDE, PRESERVATIVE FREE 10 ML: 5 INJECTION INTRAVENOUS at 20:45

## 2023-06-08 RX ADMIN — AMLODIPINE BESYLATE 5 MG: 5 TABLET ORAL at 10:23

## 2023-06-08 RX ADMIN — PHYTONADIONE 10 MG: 5 TABLET ORAL at 12:34

## 2023-06-08 RX ADMIN — POTASSIUM CHLORIDE 40 MEQ: 1500 TABLET, EXTENDED RELEASE ORAL at 05:29

## 2023-06-08 RX ADMIN — ATORVASTATIN CALCIUM 20 MG: 20 TABLET, FILM COATED ORAL at 10:23

## 2023-06-08 RX ADMIN — SODIUM CHLORIDE, PRESERVATIVE FREE 10 ML: 5 INJECTION INTRAVENOUS at 10:24

## 2023-06-08 ASSESSMENT — ENCOUNTER SYMPTOMS
ABDOMINAL DISTENTION: 0
WHEEZING: 0
BLOOD IN STOOL: 0
SORE THROAT: 0
SHORTNESS OF BREATH: 0
PHOTOPHOBIA: 0
NAUSEA: 0
ABDOMINAL PAIN: 0
COUGH: 0
VOMITING: 0
DIARRHEA: 0
CONSTIPATION: 0

## 2023-06-08 ASSESSMENT — PAIN - FUNCTIONAL ASSESSMENT: PAIN_FUNCTIONAL_ASSESSMENT: NONE - DENIES PAIN

## 2023-06-08 NOTE — CARE COORDINATION
Case Management Assessment  Initial Evaluation    Date/Time of Evaluation: 6/8/2023 3:03 PM  Assessment Completed by: Ida Reich RN    If patient is discharged prior to next notation, then this note serves as note for discharge by case management. Patient Name: Alisson Chapin                   YOB: 1940  Diagnosis: Pacemaker at end of battery life [Z45.010]  Pacemaker failure, initial encounter Alex                    Date / Time: 6/8/2023  1:16 AM    Patient Admission Status: Inpatient   Readmission Risk (Low < 19, Mod (19-27), High > 27): Readmission Risk Score: 13.1    Current PCP: Mal Acharya PA-C  PCP verified by CM? Yes    Chart Reviewed: Yes      History Provided by: Patient  Patient Orientation: Alert and Oriented    Patient Cognition: Alert    Hospitalization in the last 30 days (Readmission):  No    If yes, Readmission Assessment in CM Navigator will be completed. Advance Directives:      Code Status: Full Code   Patient's Primary Decision Maker is:        Discharge Planning:    Patient lives with: Spouse/Significant Other Type of Home: House  Primary Care Giver: Self  Patient Support Systems include: Spouse/Significant Other, Children   Current Financial resources:    Current community resources:    Current services prior to admission: None            Current DME:              Type of Home Care services:  None    ADLS  Prior functional level: Independent in ADLs/IADLs  Current functional level: Independent in ADLs/IADLs    PT AM-PAC:   /24  OT AM-PAC:   /24    Family can provide assistance at DC: Would you like Case Management to discuss the discharge plan with any other family members/significant others, and if so, who?     Plans to Return to Present Housing: Yes  Other Identified Issues/Barriers to RETURNING to current housing: none  Potential Assistance needed at discharge: N/A            Potential DME:    Patient expects to discharge to: [address] [place] [address]

## 2023-06-08 NOTE — ED PROVIDER NOTES
Has T wave inversion in aVL.   T wave findings were seen in aVL on patient's EKG on 4/19/16  Phill Hernandez MD      Critical Care: None    Phill Hernandez MD  Attending Emergency Physician        Phill Hernandez MD  06/08/23 [address], [place]  06/08/23 4957
ordered. Risk  Prescription drug management. Decision regarding hospitalization. EKG  EKG Interpretation    Interpreted by me    Rhythm: dual-paced  Rate: 66bpm  Axis: normal  Ectopy: none  Conduction: normal  ST Segments: no acute change  T Waves: no acute change  Q Waves: none    Clinical Impression: AV dual paced rhythm    All EKG's are interpreted by the Emergency Department Physician who either signs or Co-signs this chart in the absence of a cardiologist.    EMERGENCY DEPARTMENT COURSE:    ED Course as of 06/08/23 0719   Thu Jun 08, 2023   0211 On attending evaluation family members were present, made aware of patient having symptoms of chest pain, diaphoresis, and radiation of pain. Will plan for cardiac workup at this time to include CBC, CMP, mag, tropx2. [JS]   3212 Normal examination with low lung volumes and pacemaker in place. [JS]   7950 WBC: 8.4 [JS]   0311 Hemoglobin Quant: 15.7 [JS]   5928 Had discussion with patient about being admitted here vs transfered to [facility]. Patient amenable to being admitted here for cardiology evaluation. Did inform of potential for transfer should cardiology team here feel he should go to [facility]. [JS]   0955 Troponin, High Sensitivity: 10  From 9 [JS]   0519 Potassium(!): 3.4  Given 40mEq [JS]   4763 Spoke with cardiology, recommends at this time, will see patient today. Spoke with [facility], patient accepted for admission at this time. Patient and family updated on plan at this time. [JS]      ED Course User Index  [JS] Colleen Rich DO       PROCEDURES:    CONSULTS:  IP CONSULT TO CARDIOLOGY  IP CONSULT TO HOSPITALIST  PHARMACY TO DOSE WARFARIN    CRITICAL CARE:      FINAL IMPRESSION      1. Pacemaker at end of battery life          DISPOSITION / [address] Admitted 06/08/2023 05:43:23 AM      PATIENT REFERRED TO:  No follow-up provider specified.     DISCHARGE MEDICATIONS:  New Prescriptions    No medications on file       Mckenna King

## 2023-06-08 NOTE — ED NOTES
Pt presents to ED 6 c/o his pacemaker signaling low battery. Pt states that he is supposed to be getting his battery replaced soon but doesn't have an appointment at this time. Pt states that he was asleep tonight when the pacemaker signal woke him up. Pt denies chest pain however states he is experiencing slight SOB. Pt placed on full monitor, blood labs drawn, EKG done and resident at bedside to assess.      Marylou Sullivan RN  06/08/23 0127
Pt resting in bed A&O x4, RR even and non-labored, family at bedside     [facility], [place], [facility]  06/08/23 8582
Report called to 3B, all questions answered       Nicolasa Real RN  06/08/23 9921
X-ray at bedside     Ariana Perales, [address] [facility]  06/08/23 8333
Hyperglycemia     Hyperlipidemia     Hypertension     MVP (mitral valve prolapse)     Osteoarthritis     Reflux     Wears glasses        Labs:  Labs Reviewed   COMPREHENSIVE METABOLIC PANEL - Abnormal; Notable for the following components:       Result Value    Creatinine 0.55 (*)     Calcium 7.4 (*)     Potassium 3.4 (*)     Chloride 112 (*)     Total Protein 5.5 (*)     Albumin 3.3 (*)     All other components within normal limits   PROTIME-INR - Abnormal; Notable for the following components:    Protime 29.1 (*)     All other components within normal limits   CBC WITH AUTO DIFFERENTIAL   TROPONIN   SPECIMEN REJECTION   MAGNESIUM   TROPONIN   PREVIOUS SPECIMEN       Electronically signed by Niles Bean RN on 6/8/2023 at 5:37 AM      Niles Bean RN  06/08/23 9914

## 2023-06-08 NOTE — H&P
8.10 k/uL    Absolute Lymph # 3.05 1.10 - 3.70 k/uL    Absolute Mono # 0.59 0.10 - 1.20 k/uL    Absolute Eos # 0.20 0.00 - 0.44 k/uL    Basophils Absolute 0.04 0.00 - 0.20 k/uL    Absolute Immature Granulocyte <0.03 0.00 - 0.30 k/uL   Troponin    Collection Time: 06/08/23  2:17 AM   Result Value Ref Range    Troponin, High Sensitivity 9 0 - 22 ng/L   SPECIMEN REJECTION    Collection Time: 06/08/23  2:17 AM   Result Value Ref Range    Specimen Source . BLOOD     Ordered Test CP,MG     Reason for Rejection Unable to perform testing: Specimen hemolyzed. Comprehensive Metabolic Panel    Collection Time: 06/08/23  4:19 AM   Result Value Ref Range    Glucose 91 70 - 99 mg/dL    BUN 19 8 - 23 mg/dL    Creatinine 0.55 (L) 0.70 - 1.20 mg/dL    Est, Glom Filt Rate >60 >60 mL/min/1.73m2    Calcium 7.4 (L) 8.6 - 10.4 mg/dL    Sodium 143 135 - 144 mmol/L    Potassium 3.4 (L) 3.7 - 5.3 mmol/L    Chloride 112 (H) 98 - 107 mmol/L    CO2 22 20 - 31 mmol/L    Anion Gap 9 9 - 17 mmol/L    Alkaline Phosphatase 62 40 - 129 U/L    ALT 11 5 - 41 U/L    AST 13 <40 U/L    Total Bilirubin 0.5 0.3 - 1.2 mg/dL    Total Protein 5.5 (L) 6.4 - 8.3 g/dL    Albumin 3.3 (L) 3.5 - 5.2 g/dL    Albumin/Globulin Ratio 1.5 1.0 - 2.5   Magnesium    Collection Time: 06/08/23  4:19 AM   Result Value Ref Range    Magnesium 2.0 1.6 - 2.6 mg/dL   Protime-INR    Collection Time: 06/08/23  4:19 AM   Result Value Ref Range    Protime 29.1 (H) 11.7 - 14.9 sec    INR 2.8    Troponin    Collection Time: 06/08/23  4:19 AM   Result Value Ref Range    Troponin, High Sensitivity 10 0 - 22 ng/L       Imaging/Diagnostics:  XR CHEST PORTABLE    Result Date: 6/8/2023  Normal examination with low lung volumes and pacemaker in place.        Assessment:  Primary Problem  Pacemaker failure, initial encounter    Active Hospital Problems    Diagnosis Date Noted    Pacemaker failure, initial encounter [T82.118A] 06/08/2023    Hypokalemia [E87.6] 06/08/2023    Hypocalcemia

## 2023-06-08 NOTE — PLAN OF CARE
Patient INR is high for battery change out. Will cancel the procedure today. Give 10 mg of Vitamin K. If by tomorrow INR less than 1.6 then will proceed with procedure. If INR is still high then will give FFP's. Discussed with Dr Susan Rausch.       Wolf Wren fellow

## 2023-06-08 NOTE — CONSULTS
Final recommendation after discussion with rounding attending       Mehrdad Shelby MD. PGY- 4  Cardiology Fellow  [facility], [address]    Attending Physician Statement  I have discussed the care of the patient, including pertinent history and exam findings, with the resident. I have seen and examined the patient and the key elements of all parts of the encounter have been performed by me. I agree with the assessment, plan and orders as documented by the resident. 80-year-old pleasant male with a history of mitral valve replacement history of sick sinus syndrome status post pacemaker came with the beeping of the pacemaker  Pacemaker interrogation shows life of battery less than 1%.   According to him with that when it is beeping patient get lightheaded dizzy no blackout  Denies any chest pain or pressure  Patient is a [facility] patient  Patient has seen last month with GILL srinivasan and they told the pacemaker will be change in 2 months now given the less than 1% and beeping and symptomatic  Patient is given the option either either we can do it here or we can transfer to [address] [place] [address] next patient want to stay here next Dr. Katie Brown will do the revision of the pacemaker   Unfortunately patient INR is 2.8  Will hold Coumadin patient need to reverse the INR  We will give vitamin K 10 mg, or FFP  Check INR in the morning if it is less than 1.6 we will do the pacemaker  Please keep it n.p.o.  Amandeep Candelario MD

## 2023-06-08 NOTE — ACP (ADVANCE CARE PLANNING)
questions/concerns   [x] Advised patient/agent/surrogate to review completed ACP document and update if needed with changes in condition, patient preferences or care setting    [] This note routed to one or more involved healthcare providers

## 2023-06-09 LAB
ANION GAP SERPL CALCULATED.3IONS-SCNC: 8 MMOL/L (ref 9–17)
BUN SERPL-MCNC: 18 MG/DL (ref 8–23)
CA-I BLD-SCNC: 1.16 MMOL/L (ref 1.13–1.33)
CALCIUM SERPL-MCNC: 8.8 MG/DL (ref 8.6–10.4)
CHLORIDE SERPL-SCNC: 105 MMOL/L (ref 98–107)
CO2 SERPL-SCNC: 24 MMOL/L (ref 20–31)
CREAT SERPL-MCNC: 0.74 MG/DL (ref 0.7–1.2)
EKG ATRIAL RATE: 64 BPM
EKG ATRIAL RATE: 67 BPM
EKG P-R INTERVAL: 200 MS
EKG P-R INTERVAL: 204 MS
EKG Q-T INTERVAL: 440 MS
EKG Q-T INTERVAL: 458 MS
EKG QRS DURATION: 158 MS
EKG QRS DURATION: 162 MS
EKG QTC CALCULATION (BAZETT): 464 MS
EKG QTC CALCULATION (BAZETT): 480 MS
EKG R AXIS: -65 DEGREES
EKG R AXIS: -71 DEGREES
EKG T AXIS: 75 DEGREES
EKG T AXIS: 85 DEGREES
EKG VENTRICULAR RATE: 66 BPM
EKG VENTRICULAR RATE: 67 BPM
GFR SERPL CREATININE-BSD FRML MDRD: >60 ML/MIN/1.73M2
GLUCOSE BLD-MCNC: 77 MG/DL (ref 75–110)
GLUCOSE BLD-MCNC: 78 MG/DL (ref 75–110)
GLUCOSE BLD-MCNC: 89 MG/DL (ref 75–110)
GLUCOSE SERPL-MCNC: 100 MG/DL (ref 70–99)
INR PPP: 1.3
POTASSIUM SERPL-SCNC: 4.1 MMOL/L (ref 3.7–5.3)
PROTHROMBIN TIME: 16.2 SEC (ref 11.7–14.9)
SODIUM SERPL-SCNC: 137 MMOL/L (ref 135–144)

## 2023-06-09 PROCEDURE — 1200000000 HC SEMI PRIVATE

## 2023-06-09 PROCEDURE — C1785 PMKR, DUAL, RATE-RESP: HCPCS

## 2023-06-09 PROCEDURE — 82330 ASSAY OF CALCIUM: CPT

## 2023-06-09 PROCEDURE — 6360000002 HC RX W HCPCS

## 2023-06-09 PROCEDURE — 2500000003 HC RX 250 WO HCPCS

## 2023-06-09 PROCEDURE — 2720000010 HC SURG SUPPLY STERILE

## 2023-06-09 PROCEDURE — 85610 PROTHROMBIN TIME: CPT

## 2023-06-09 PROCEDURE — 2709999900 HC NON-CHARGEABLE SUPPLY

## 2023-06-09 PROCEDURE — 36415 COLL VENOUS BLD VENIPUNCTURE: CPT

## 2023-06-09 PROCEDURE — 2580000003 HC RX 258: Performed by: NURSE PRACTITIONER

## 2023-06-09 PROCEDURE — 6370000000 HC RX 637 (ALT 250 FOR IP): Performed by: INTERNAL MEDICINE

## 2023-06-09 PROCEDURE — 99232 SBSQ HOSP IP/OBS MODERATE 35: CPT | Performed by: INTERNAL MEDICINE

## 2023-06-09 PROCEDURE — 33228 REMV&REPLC PM GEN DUAL LEAD: CPT

## 2023-06-09 PROCEDURE — 80048 BASIC METABOLIC PNL TOTAL CA: CPT

## 2023-06-09 PROCEDURE — 82947 ASSAY GLUCOSE BLOOD QUANT: CPT

## 2023-06-09 RX ORDER — NICOTINE 21 MG/24HR
1 PATCH, TRANSDERMAL 24 HOURS TRANSDERMAL DAILY
Status: DISCONTINUED | OUTPATIENT
Start: 2023-06-09 | End: 2023-06-10 | Stop reason: HOSPADM

## 2023-06-09 RX ORDER — ALLOPURINOL 100 MG/1
100 TABLET ORAL DAILY
Status: DISCONTINUED | OUTPATIENT
Start: 2023-06-09 | End: 2023-06-10 | Stop reason: HOSPADM

## 2023-06-09 RX ADMIN — HYDROCODONE BITARTRATE AND ACETAMINOPHEN 1 TABLET: 5; 325 TABLET ORAL at 21:54

## 2023-06-09 RX ADMIN — METOPROLOL TARTRATE 25 MG: 25 TABLET ORAL at 21:45

## 2023-06-09 RX ADMIN — ATORVASTATIN CALCIUM 20 MG: 20 TABLET, FILM COATED ORAL at 17:20

## 2023-06-09 RX ADMIN — SODIUM CHLORIDE, PRESERVATIVE FREE 10 ML: 5 INJECTION INTRAVENOUS at 17:30

## 2023-06-09 ASSESSMENT — PAIN SCALES - GENERAL
PAINLEVEL_OUTOF10: 7
PAINLEVEL_OUTOF10: 0

## 2023-06-09 NOTE — PLAN OF CARE
Problem: Discharge Planning  Goal: Discharge to home or other facility with appropriate resources  6/9/2023 0043 by Gio Farley RN  Outcome: Progressing  6/8/2023 1805 by Yari Kwan RN  Outcome: Progressing     Problem: Safety - Adult  Goal: Free from fall injury  6/9/2023 0043 by Gio Farley RN  Outcome: Progressing  6/8/2023 1805 by Yari Kwan RN  Outcome: Progressing     Problem: ABCDS Injury Assessment  Goal: Absence of physical injury  6/9/2023 0043 by Gio Farley RN  Outcome: Progressing  6/8/2023 1805 by Yari Kwan RN  Outcome: Progressing     Problem: Pain  Goal: Verbalizes/displays adequate comfort level or baseline comfort level  Outcome: Progressing     Problem: Cardiovascular - Adult  Goal: Maintains optimal cardiac output and hemodynamic stability  Outcome: Progressing  Goal: Absence of cardiac dysrhythmias or at baseline  Outcome: Progressing     Problem: Chronic Conditions and Co-morbidities  Goal: Patient's chronic conditions and co-morbidity symptoms are monitored and maintained or improved  Outcome: Progressing

## 2023-06-09 NOTE — PROCEDURES
[facility] Cardiology Consultant     Electrophysiology Device Implant                Patient Info: Jada Lopez (89 y.o., male)  1940    Date: 6/9/2023        Indication: Complete Heart Block    Surgeon: Dr. Kesha Hoover. Boyd        Procedures:  Sedation and monitoring  Permanent Pacemaker implant  Mild sedation and monitoring  New pocket creation  Old pacemaker explant  Pacemaker implantation  Intra operative RV lead testing  Intra operative RA lead testing  Pacemaker testing before, during, and after procedure  Skin closure  Fluoro time: 0      Pre Procedure Conscious Sedation Data:    ASA Class:    [] I [x] II [] III [] IV    Mallampati Class:  [] I [x] II [] III [] IV     Pacemaker: Pre procedure review    Documentation to support the medical necessity of procedure. Complete heart block with no intrinsic escape rhythm. HR 30 bpm    Reason for placement:     [x] Initial [] Recall/Malfunction  [] Upgrade  [] Beyond useful life limit        Device / Data: [employer]     Model # Serial #   Pacer [id number]   RA-Lead [id number]   RV-Lead [id number]            ATRIUM R VENT    P waves 2.5 mV R waves: 0 mV    Threshold:  0.5 x 0.4 Threshold:  2.2 x 0.4    Impedance:   350 Impedance:  285          Description  After the usual preparation of the left neck and chest with chlohexidine prep and ioban over skin, the patient was draped in the usual sterile manner. Local anesthesia was infused into the subcutaneous skin and below the left clavicle from the midline laterally. An incision was made inferior and parallel to the clavicle. The incision was carried down to the fascia, dissecting the tissue to the muscle. Scar tissue dissected, header stitch cut, and pacer removed . Both pacers held side by side. RV lead quickly removed from old pacer and placed in new pacer. Pacer and leads tested. Pocket rinsed with NS and gentamycin. IV vancomycin was being infused.     Fascia closed with

## 2023-06-10 VITALS
HEART RATE: 61 BPM | OXYGEN SATURATION: 94 % | BODY MASS INDEX: 24.29 KG/M2 | RESPIRATION RATE: 16 BRPM | DIASTOLIC BLOOD PRESSURE: 83 MMHG | WEIGHT: 164 LBS | TEMPERATURE: 97.4 F | HEIGHT: 69 IN | SYSTOLIC BLOOD PRESSURE: 130 MMHG

## 2023-06-10 LAB
INR PPP: 1.2
PROTHROMBIN TIME: 15 SEC (ref 11.7–14.9)

## 2023-06-10 PROCEDURE — 6370000000 HC RX 637 (ALT 250 FOR IP): Performed by: INTERNAL MEDICINE

## 2023-06-10 PROCEDURE — 36415 COLL VENOUS BLD VENIPUNCTURE: CPT

## 2023-06-10 PROCEDURE — 99239 HOSP IP/OBS DSCHRG MGMT >30: CPT | Performed by: INTERNAL MEDICINE

## 2023-06-10 PROCEDURE — 2580000003 HC RX 258: Performed by: NURSE PRACTITIONER

## 2023-06-10 PROCEDURE — 85610 PROTHROMBIN TIME: CPT

## 2023-06-10 RX ORDER — NICOTINE 21 MG/24HR
1 PATCH, TRANSDERMAL 24 HOURS TRANSDERMAL DAILY
Qty: 7 PATCH | Refills: 0 | Status: SHIPPED | OUTPATIENT
Start: 2023-06-11

## 2023-06-10 RX ADMIN — METOPROLOL TARTRATE 25 MG: 25 TABLET ORAL at 08:34

## 2023-06-10 RX ADMIN — ATORVASTATIN CALCIUM 20 MG: 20 TABLET, FILM COATED ORAL at 08:34

## 2023-06-10 RX ADMIN — SODIUM CHLORIDE, PRESERVATIVE FREE 10 ML: 5 INJECTION INTRAVENOUS at 09:00

## 2023-06-10 RX ADMIN — HYDROCODONE BITARTRATE AND ACETAMINOPHEN 1 TABLET: 5; 325 TABLET ORAL at 07:05

## 2023-06-10 ASSESSMENT — PAIN SCALES - GENERAL
PAINLEVEL_OUTOF10: 3
PAINLEVEL_OUTOF10: 7

## 2023-06-10 NOTE — PLAN OF CARE
Problem: Discharge Planning  Goal: Discharge to home or other facility with appropriate resources  Outcome: Progressing     Problem: Safety - Adult  Goal: Free from fall injury  Outcome: Progressing     Problem: ABCDS Injury Assessment  Goal: Absence of physical injury  Outcome: Progressing     Problem: Pain  Goal: Verbalizes/displays adequate comfort level or baseline comfort level  Outcome: Progressing     Problem: Cardiovascular - Adult  Goal: Maintains optimal cardiac output and hemodynamic stability  Outcome: Progressing  Goal: Absence of cardiac dysrhythmias or at baseline  Outcome: Progressing     Problem: Chronic Conditions and Co-morbidities  Goal: Patient's chronic conditions and co-morbidity symptoms are monitored and maintained or improved  Outcome: Progressing

## 2023-06-10 NOTE — DISCHARGE INSTRUCTIONS
-Make an appointment with your primary care physician within 1 week of discharge for wound recheck and for reevaluation of your symptoms. Continue follow-up with your cardiologist for management of your pacemaker. Return the hospital if you develop any chest pain, shortness of breath, belly breathing, nausea, vomiting, diarrhea.

## 2023-06-10 NOTE — DISCHARGE SUMMARY
of mitral valve replacement 2014, history of sick sinus syndrome with complete heart block status post dual-chamber pacer, paroxysmal atrial fibrillation and COPD who came to the hospital for evaluation after his pacemaker alarm was going off for low battery. Patient did have associated dizziness and instability with walking so cardiology recommended replacement of battery which was done successfully on 6/9/2023. Currently patient is stable for discharge, needs to follow up with his cardiologist for wound recheck and his PCP on discharge. Continue coumadin and BB. Significant therapeutic interventions: see above    Significant Diagnostic Studies:   Labs / Micro:  CBC:   Lab Results   Component Value Date/Time    WBC 8.4 06/08/2023 02:17 AM    RBC 5.08 06/08/2023 02:17 AM    RBC 4.82 05/07/2012 09:00 AM    HGB 15.7 06/08/2023 02:17 AM    HCT 48.1 06/08/2023 02:17 AM    MCV 94.7 06/08/2023 02:17 AM    MCH 30.9 06/08/2023 02:17 AM    MCHC 32.6 06/08/2023 02:17 AM    RDW 13.4 06/08/2023 02:17 AM     06/08/2023 02:17 AM     05/07/2012 09:00 AM     BMP:    Lab Results   Component Value Date/Time    GLUCOSE 100 06/09/2023 04:53 AM    GLUCOSE 106 05/07/2012 09:00 AM     06/09/2023 04:53 AM    K 4.1 06/09/2023 04:53 AM     06/09/2023 04:53 AM    CO2 24 06/09/2023 04:53 AM    ANIONGAP 8 06/09/2023 04:53 AM    BUN 18 06/09/2023 04:53 AM    CREATININE 0.74 06/09/2023 04:53 AM    BUNCRER NOT REPORTED 04/14/2016 05:07 AM    CALCIUM 8.8 06/09/2023 04:53 AM    LABGLOM >60 06/09/2023 04:53 AM    GFRAA >60 10/29/2018 11:02 AM    GFR      10/29/2018 11:02 AM    GFR NOT REPORTED 10/29/2018 11:02 AM        Radiology:  XR CHEST PORTABLE    Result Date: 6/8/2023  Normal examination with low lung volumes and pacemaker in place.        Consultations:    Consults:     Final Specialist Recommendations/Findings:   IP CONSULT TO CARDIOLOGY  IP CONSULT TO HOSPITALIST  IP CONSULT TO IV TEAM      The patient was

## 2023-06-10 NOTE — PROGRESS NOTES
Patient admitted, consent signed and questions answered. Patient ready for procedure. Call light to reach with side rails up 2 of 2. Left upper chest clipped with writer and Shannan present. Family at bedside with patient. History and physical in [facility].
Pharmacy Note  Warfarin Consult    Edilberto Hameed is a 80 y.o. male for whom pharmacy has been consulted to manage warfarin therapy. Consulting Physician: Dr Cosme Hanna  Reason for Admission: Pacemaker issue    Warfarin dose prior to admission: 3 mg daily   Warfarin indication: afib  Target INR range: 2.0-3.0     Past Medical History:   Diagnosis Date    Anxiety     Arthritis     BPH (benign prostatic hyperplasia)     Constipation     COPD (chronic obstructive pulmonary disease) (HCC)     Current use of aspirin     Hyperglycemia     Hyperlipidemia     Hypertension     MVP (mitral valve prolapse)     Osteoarthritis     Reflux     Wears glasses                 Recent Labs     06/08/23  0419   INR 2.8     Recent Labs     06/08/23  0217   HGB 15.7   HCT 48.1          Current warfarin drug-drug interactions:       Date             INR        Dose   6/8/2023            2.8       3 mg     Daily PT/INR while inpatient. PT/INR ordered to start 6/8. Thank you for the consult. Will continue to follow.   Thank you  Armen Zamudio, PharmD, [facility]  Inpatient Clinical Pharmacist  [phone]
Pharmacy Note  Warfarin Consult    Eldon Kay is a 80 y.o. male for whom pharmacy has been consulted to manage warfarin therapy. Consulting Physician: Dr Gerson Hernandez  Reason for Admission: Pacemaker issue    Warfarin dose prior to admission: 3 mg daily   Warfarin indication: afib  Target INR range: 2.0-3.0     Past Medical History:   Diagnosis Date    Anxiety     Arthritis     BPH (benign prostatic hyperplasia)     Constipation     COPD (chronic obstructive pulmonary disease) (HCC)     Current use of aspirin     Hyperglycemia     Hyperlipidemia     Hypertension     MVP (mitral valve prolapse)     Osteoarthritis     Reflux     Wears glasses                 Recent Labs     06/08/23  0419   INR 2.8     Recent Labs     06/08/23  0217   HGB 15.7   HCT 48.1          Current warfarin drug-drug interactions:       Date             INR        Dose   6/8/2023            2.8       3 mg     Daily PT/INR while inpatient. PT/INR ordered to start 6/8. Thank you for the consult. Will continue to follow.   Thank you  Chantelle Bae, PharmD, [facility]  Inpatient Clinical Pharmacist  [phone]
Scheduled Meds:    metoprolol tartrate  25 mg Oral BID    nicotine  1 patch TransDERmal Daily    allopurinol  100 mg Oral Daily    vancomycin  1,000 mg IntraVENous Once    irrigation builder   Irrigation Once    sodium chloride flush  5-40 mL IntraVENous 2 times per day    [Held by provider] aspirin  81 mg Oral Daily    atorvastatin  20 mg Oral Daily    tamsulosin  0.4 mg Oral Daily    amLODIPine  5 mg Oral Daily     Continuous Infusions:    sodium chloride       PRN Meds: nicotine polacrilex, sodium chloride flush, sodium chloride, potassium chloride **OR** potassium alternative oral replacement **OR** potassium chloride, magnesium sulfate, ondansetron **OR** ondansetron, polyethylene glycol, acetaminophen **OR** acetaminophen, HYDROcodone-acetaminophen    Data:     Past Medical History:   has a past medical history of Anxiety, Arthritis, BPH (benign prostatic hyperplasia), Constipation, COPD (chronic obstructive pulmonary disease) ([address] Current use of aspirin, Hyperglycemia, Hyperlipidemia, Hypertension, MVP (mitral valve prolapse), Osteoarthritis, Reflux, and Wears glasses. Social History:   reports that he has been smoking cigarettes and cigars. He started smoking about 63 years ago. He has never used smokeless tobacco. He reports current alcohol use of about 2.0 standard drinks per week. He reports that he does not use drugs.      Family History:   Family History   Problem Relation Age of Onset    Colon Cancer Sister     High Blood Pressure Brother     Heart Disease Brother     Heart Attack Brother         defibrilator in    Heart Attack Father     Dementia Mother        Vitals:  /83   Pulse 61   Temp 97.4 °F (36.3 °C) (Oral)   Resp 16   Ht 5' 9\" (1.753 m)   Wt 164 lb (74.4 kg)   SpO2 94%   BMI 24.22 kg/m²   Temp (24hrs), Av.6 °F (36.4 °C), Min:97.4 °F (36.3 °C), Max:97.7 °F (36.5 °C)    Recent Labs     23  1142 23  0718 23  1215 23  1707   POCGLU 76 89 77 78
Net -630 ml       Labs:  Hematology:  Recent Labs     06/08/23 0217 06/08/23 0419 06/09/23 0453   WBC 8.4  --   --    RBC 5.08  --   --    HGB 15.7  --   --    HCT 48.1  --   --    MCV 94.7  --   --    MCH 30.9  --   --    MCHC 32.6  --   --    RDW 13.4  --   --      --   --    MPV 11.6  --   --    INR  --  2.8 1.3     Chemistry:  Recent Labs     06/08/23 0217 06/08/23 0419 06/09/23 0453   NA  --  143 137   K  --  3.4* 4.1   CL  --  112* 105   CO2  --  22 24   GLUCOSE  --  91 100*   BUN  --  19 18   CREATININE  --  0.55* 0.74   MG  --  2.0  --    ANIONGAP  --  9 8*   LABGLOM  --  >60 >60   CALCIUM  --  7.4* 8.8   CAION  --   --  1.16   TROPHS 9 10  --      Recent Labs     06/08/23 0419 06/08/23  1142 06/09/23  0718   PROT 5.5*  --   --    LABALBU 3.3*  --   --    AST 13  --   --    ALT 11  --   --    ALKPHOS 62  --   --    BILITOT 0.5  --   --    POCGLU  --  76 89     ABG:No results found for: POCPH, PHART, PH, POCPCO2, AXB4FXD, PCO2, POCPO2, PO2ART, PO2, POCHCO3, NON5ZDW, HCO3, NBEA, PBEA, BEART, BE, THGBART, THB, RCL6IMB, BIUM2AFH, X5IWAKZQ, O2SAT, FIO2  No results found for: SPECIAL  No results found for: CULTURE    Radiology:  XR CHEST PORTABLE    Result Date: 6/8/2023  Normal examination with low lung volumes and pacemaker in place.        Physical Examination:        General appearance:  alert, cooperative and no distress  Mental Status:  oriented to person, place and time and normal affect  Lungs:  clear to auscultation bilaterally, normal effort  Heart:  regular rate and rhythm, no murmur  Abdomen:  soft, nontender, nondistended, normal bowel sounds, no masses, hepatomegaly, splenomegaly  Extremities:  no edema, redness, tenderness in the calves  Skin:  no gross lesions, rashes, induration    Assessment:        Hospital Problems             Last Modified POA    * (Principal) Pacemaker failure, initial encounter 6/8/2023 Yes    [facility] 6/8/2023 Yes
Paroxysmal atrial fibrillation (HCC)     Hypokalemia     Hypocalcemia     Pacemaker at end of battery life      Plan of Treatment:   SSS s/p battery change out . AFib. Continue BB/CCB Restart Coumadin. Continue statin    Discussed wound care.   Will plan for 10 day staple removal.    Follow up with primary cardiologist after wound check and staple removal     Electronically signed by KERRY Junior CNP on 6/10/2023 at 10:02 [facility].  [phone]

## 2023-12-10 ENCOUNTER — APPOINTMENT (OUTPATIENT)
Dept: CT IMAGING | Age: 83
DRG: 208 | End: 2023-12-10
Payer: MEDICARE

## 2023-12-10 ENCOUNTER — APPOINTMENT (OUTPATIENT)
Dept: GENERAL RADIOLOGY | Age: 83
DRG: 208 | End: 2023-12-10
Payer: MEDICARE

## 2023-12-10 ENCOUNTER — HOSPITAL ENCOUNTER (INPATIENT)
Age: 83
LOS: 29 days | Discharge: NURSING, REHAB OR LONG-TERM CARE | DRG: 208 | End: 2024-01-08
Attending: EMERGENCY MEDICINE | Admitting: INTERNAL MEDICINE
Payer: MEDICARE

## 2023-12-10 ENCOUNTER — APPOINTMENT (OUTPATIENT)
Dept: ULTRASOUND IMAGING | Age: 83
DRG: 208 | End: 2023-12-10
Payer: MEDICARE

## 2023-12-10 DIAGNOSIS — I48.0 PAROXYSMAL ATRIAL FIBRILLATION (HCC): ICD-10-CM

## 2023-12-10 DIAGNOSIS — R13.12 OROPHARYNGEAL DYSPHAGIA: ICD-10-CM

## 2023-12-10 DIAGNOSIS — I49.5 SICK SINUS SYNDROME (HCC): ICD-10-CM

## 2023-12-10 DIAGNOSIS — Z95.2 H/O MITRAL VALVE REPLACEMENT WITH MECHANICAL VALVE: ICD-10-CM

## 2023-12-10 DIAGNOSIS — U07.1 COVID-19: Primary | ICD-10-CM

## 2023-12-10 DIAGNOSIS — N17.9 AKI (ACUTE KIDNEY INJURY) (HCC): ICD-10-CM

## 2023-12-10 PROBLEM — R79.82 ELEVATED C-REACTIVE PROTEIN (CRP): Status: ACTIVE | Noted: 2023-12-10

## 2023-12-10 PROBLEM — J12.82 PNEUMONIA DUE TO COVID-19 VIRUS: Status: ACTIVE | Noted: 2023-12-10

## 2023-12-10 PROBLEM — Z28.310 UNVACCINATED FOR COVID-19: Status: ACTIVE | Noted: 2023-12-10

## 2023-12-10 PROBLEM — I95.1 ORTHOSTATIC HYPOTENSION: Status: ACTIVE | Noted: 2023-12-10

## 2023-12-10 PROBLEM — R79.89 ELEVATED TROPONIN: Status: ACTIVE | Noted: 2023-12-10

## 2023-12-10 PROBLEM — R09.02 HYPOXIA: Status: ACTIVE | Noted: 2023-12-10

## 2023-12-10 PROBLEM — R55 SYNCOPE AND COLLAPSE: Status: ACTIVE | Noted: 2023-12-10

## 2023-12-10 LAB
ALBUMIN SERPL-MCNC: 3.8 G/DL (ref 3.5–5.2)
ALBUMIN/GLOB SERPL: 1.1 {RATIO} (ref 1–2.5)
ALP SERPL-CCNC: 63 U/L (ref 40–129)
ALT SERPL-CCNC: 37 U/L (ref 5–41)
ANION GAP SERPL CALCULATED.3IONS-SCNC: 14 MMOL/L (ref 9–17)
AST SERPL-CCNC: 63 U/L
B PARAP IS1001 DNA NPH QL NAA+NON-PROBE: NOT DETECTED
B PERT DNA SPEC QL NAA+PROBE: NOT DETECTED
BASOPHILS # BLD: <0.03 K/UL (ref 0–0.2)
BASOPHILS NFR BLD: 0 % (ref 0–2)
BILIRUB SERPL-MCNC: 0.7 MG/DL (ref 0.3–1.2)
BUN SERPL-MCNC: 56 MG/DL (ref 8–23)
C PNEUM DNA NPH QL NAA+NON-PROBE: NOT DETECTED
CALCIUM SERPL-MCNC: 8.8 MG/DL (ref 8.6–10.4)
CHLORIDE SERPL-SCNC: 104 MMOL/L (ref 98–107)
CO2 SERPL-SCNC: 22 MMOL/L (ref 20–31)
CREAT SERPL-MCNC: 1.4 MG/DL (ref 0.7–1.2)
CRP SERPL HS-MCNC: 63.2 MG/L (ref 0–5)
EOSINOPHIL # BLD: <0.03 K/UL (ref 0–0.44)
EOSINOPHILS RELATIVE PERCENT: 0 % (ref 1–4)
ERYTHROCYTE [DISTWIDTH] IN BLOOD BY AUTOMATED COUNT: 12.6 % (ref 11.8–14.4)
FLUAV RNA NPH QL NAA+NON-PROBE: NOT DETECTED
FLUBV RNA NPH QL NAA+NON-PROBE: NOT DETECTED
GFR SERPL CREATININE-BSD FRML MDRD: 50 ML/MIN/1.73M2
GLUCOSE BLD-MCNC: 108 MG/DL (ref 75–110)
GLUCOSE SERPL-MCNC: 114 MG/DL (ref 70–99)
HADV DNA NPH QL NAA+NON-PROBE: NOT DETECTED
HCOV 229E RNA NPH QL NAA+NON-PROBE: NOT DETECTED
HCOV HKU1 RNA NPH QL NAA+NON-PROBE: NOT DETECTED
HCOV NL63 RNA NPH QL NAA+NON-PROBE: NOT DETECTED
HCOV OC43 RNA NPH QL NAA+NON-PROBE: NOT DETECTED
HCT VFR BLD AUTO: 47.9 % (ref 40.7–50.3)
HGB BLD-MCNC: 16.4 G/DL (ref 13–17)
HMPV RNA NPH QL NAA+NON-PROBE: NOT DETECTED
HPIV1 RNA NPH QL NAA+NON-PROBE: NOT DETECTED
HPIV2 RNA NPH QL NAA+NON-PROBE: NOT DETECTED
HPIV3 RNA NPH QL NAA+NON-PROBE: NOT DETECTED
HPIV4 RNA NPH QL NAA+NON-PROBE: NOT DETECTED
IMM GRANULOCYTES # BLD AUTO: 0.05 K/UL (ref 0–0.3)
IMM GRANULOCYTES NFR BLD: 1 %
INR PPP: 3.1
LACTIC ACID, SEPSIS WHOLE BLOOD: 1.8 MMOL/L (ref 0.5–1.9)
LACTIC ACID, SEPSIS WHOLE BLOOD: 2 MMOL/L (ref 0.5–1.9)
LYMPHOCYTES NFR BLD: 0.7 K/UL (ref 1.1–3.7)
LYMPHOCYTES RELATIVE PERCENT: 7 % (ref 24–43)
M PNEUMO DNA NPH QL NAA+NON-PROBE: NOT DETECTED
MAGNESIUM SERPL-MCNC: 2.5 MG/DL (ref 1.6–2.6)
MCH RBC QN AUTO: 31.3 PG (ref 25.2–33.5)
MCHC RBC AUTO-ENTMCNC: 34.2 G/DL (ref 28.4–34.8)
MCV RBC AUTO: 91.4 FL (ref 82.6–102.9)
MONOCYTES NFR BLD: 0.77 K/UL (ref 0.1–1.2)
MONOCYTES NFR BLD: 8 % (ref 3–12)
NEUTROPHILS NFR BLD: 84 % (ref 36–65)
NEUTS SEG NFR BLD: 7.9 K/UL (ref 1.5–8.1)
NRBC BLD-RTO: 0 PER 100 WBC
PLATELET # BLD AUTO: 104 K/UL (ref 138–453)
PMV BLD AUTO: 11.9 FL (ref 8.1–13.5)
POTASSIUM SERPL-SCNC: 3.6 MMOL/L (ref 3.7–5.3)
PROT SERPL-MCNC: 7.3 G/DL (ref 6.4–8.3)
PROTHROMBIN TIME: 31.4 SEC (ref 11.7–14.9)
RBC # BLD AUTO: 5.24 M/UL (ref 4.21–5.77)
RSV RNA NPH QL NAA+NON-PROBE: NOT DETECTED
RV+EV RNA NPH QL NAA+NON-PROBE: NOT DETECTED
SARS-COV-2 RNA NPH QL NAA+NON-PROBE: DETECTED
SODIUM SERPL-SCNC: 140 MMOL/L (ref 135–144)
SPECIMEN DESCRIPTION: ABNORMAL
TROPONIN I SERPL HS-MCNC: 34 NG/L (ref 0–22)
TROPONIN I SERPL HS-MCNC: 38 NG/L (ref 0–22)
WBC OTHER # BLD: 9.4 K/UL (ref 3.5–11.3)

## 2023-12-10 PROCEDURE — 6370000000 HC RX 637 (ALT 250 FOR IP)

## 2023-12-10 PROCEDURE — 85610 PROTHROMBIN TIME: CPT

## 2023-12-10 PROCEDURE — 0202U NFCT DS 22 TRGT SARS-COV-2: CPT

## 2023-12-10 PROCEDURE — 87506 IADNA-DNA/RNA PROBE TQ 6-11: CPT

## 2023-12-10 PROCEDURE — 80053 COMPREHEN METABOLIC PANEL: CPT

## 2023-12-10 PROCEDURE — XW033E5 INTRODUCTION OF REMDESIVIR ANTI-INFECTIVE INTO PERIPHERAL VEIN, PERCUTANEOUS APPROACH, NEW TECHNOLOGY GROUP 5: ICD-10-PCS | Performed by: INTERNAL MEDICINE

## 2023-12-10 PROCEDURE — 83605 ASSAY OF LACTIC ACID: CPT

## 2023-12-10 PROCEDURE — 6370000000 HC RX 637 (ALT 250 FOR IP): Performed by: INTERNAL MEDICINE

## 2023-12-10 PROCEDURE — 6360000002 HC RX W HCPCS: Performed by: STUDENT IN AN ORGANIZED HEALTH CARE EDUCATION/TRAINING PROGRAM

## 2023-12-10 PROCEDURE — 6360000002 HC RX W HCPCS

## 2023-12-10 PROCEDURE — 87449 NOS EACH ORGANISM AG IA: CPT

## 2023-12-10 PROCEDURE — 93005 ELECTROCARDIOGRAM TRACING: CPT | Performed by: STUDENT IN AN ORGANIZED HEALTH CARE EDUCATION/TRAINING PROGRAM

## 2023-12-10 PROCEDURE — 74177 CT ABD & PELVIS W/CONTRAST: CPT

## 2023-12-10 PROCEDURE — 87040 BLOOD CULTURE FOR BACTERIA: CPT

## 2023-12-10 PROCEDURE — 87641 MR-STAPH DNA AMP PROBE: CPT

## 2023-12-10 PROCEDURE — 99285 EMERGENCY DEPT VISIT HI MDM: CPT

## 2023-12-10 PROCEDURE — 99223 1ST HOSP IP/OBS HIGH 75: CPT | Performed by: INTERNAL MEDICINE

## 2023-12-10 PROCEDURE — 6360000002 HC RX W HCPCS: Performed by: NURSE PRACTITIONER

## 2023-12-10 PROCEDURE — 2580000003 HC RX 258: Performed by: STUDENT IN AN ORGANIZED HEALTH CARE EDUCATION/TRAINING PROGRAM

## 2023-12-10 PROCEDURE — 82947 ASSAY GLUCOSE BLOOD QUANT: CPT

## 2023-12-10 PROCEDURE — 83735 ASSAY OF MAGNESIUM: CPT

## 2023-12-10 PROCEDURE — 2060000000 HC ICU INTERMEDIATE R&B

## 2023-12-10 PROCEDURE — 82570 ASSAY OF URINE CREATININE: CPT

## 2023-12-10 PROCEDURE — 85025 COMPLETE CBC W/AUTO DIFF WBC: CPT

## 2023-12-10 PROCEDURE — 86140 C-REACTIVE PROTEIN: CPT

## 2023-12-10 PROCEDURE — 2580000003 HC RX 258

## 2023-12-10 PROCEDURE — 71046 X-RAY EXAM CHEST 2 VIEWS: CPT

## 2023-12-10 PROCEDURE — 2580000003 HC RX 258: Performed by: NURSE PRACTITIONER

## 2023-12-10 PROCEDURE — 76770 US EXAM ABDO BACK WALL COMP: CPT

## 2023-12-10 PROCEDURE — 87324 CLOSTRIDIUM AG IA: CPT

## 2023-12-10 PROCEDURE — 84300 ASSAY OF URINE SODIUM: CPT

## 2023-12-10 PROCEDURE — 76705 ECHO EXAM OF ABDOMEN: CPT

## 2023-12-10 PROCEDURE — 6360000004 HC RX CONTRAST MEDICATION: Performed by: STUDENT IN AN ORGANIZED HEALTH CARE EDUCATION/TRAINING PROGRAM

## 2023-12-10 PROCEDURE — 81001 URINALYSIS AUTO W/SCOPE: CPT

## 2023-12-10 PROCEDURE — 84484 ASSAY OF TROPONIN QUANT: CPT

## 2023-12-10 PROCEDURE — 96374 THER/PROPH/DIAG INJ IV PUSH: CPT

## 2023-12-10 RX ORDER — ACETAMINOPHEN 325 MG/1
650 TABLET ORAL EVERY 6 HOURS PRN
Status: DISCONTINUED | OUTPATIENT
Start: 2023-12-10 | End: 2024-01-08 | Stop reason: HOSPADM

## 2023-12-10 RX ORDER — POTASSIUM CHLORIDE 7.45 MG/ML
10 INJECTION INTRAVENOUS PRN
Status: DISCONTINUED | OUTPATIENT
Start: 2023-12-10 | End: 2024-01-08 | Stop reason: HOSPADM

## 2023-12-10 RX ORDER — 0.9 % SODIUM CHLORIDE 0.9 %
30 INTRAVENOUS SOLUTION INTRAVENOUS PRN
Status: DISCONTINUED | OUTPATIENT
Start: 2023-12-10 | End: 2024-01-08 | Stop reason: HOSPADM

## 2023-12-10 RX ORDER — ACETAMINOPHEN 650 MG/1
650 SUPPOSITORY RECTAL EVERY 6 HOURS PRN
Status: DISCONTINUED | OUTPATIENT
Start: 2023-12-10 | End: 2024-01-08 | Stop reason: HOSPADM

## 2023-12-10 RX ORDER — WARFARIN SODIUM 2 MG/1
2 TABLET ORAL
Status: COMPLETED | OUTPATIENT
Start: 2023-12-10 | End: 2023-12-10

## 2023-12-10 RX ORDER — SODIUM CHLORIDE 9 MG/ML
INJECTION, SOLUTION INTRAVENOUS PRN
Status: DISCONTINUED | OUTPATIENT
Start: 2023-12-10 | End: 2024-01-08 | Stop reason: HOSPADM

## 2023-12-10 RX ORDER — SODIUM CHLORIDE 0.9 % (FLUSH) 0.9 %
5-40 SYRINGE (ML) INJECTION PRN
Status: DISCONTINUED | OUTPATIENT
Start: 2023-12-10 | End: 2024-01-08 | Stop reason: HOSPADM

## 2023-12-10 RX ORDER — POTASSIUM CHLORIDE 20 MEQ/1
40 TABLET, EXTENDED RELEASE ORAL ONCE
Status: COMPLETED | OUTPATIENT
Start: 2023-12-10 | End: 2023-12-10

## 2023-12-10 RX ORDER — ONDANSETRON 4 MG/1
4 TABLET, ORALLY DISINTEGRATING ORAL EVERY 8 HOURS PRN
Status: DISCONTINUED | OUTPATIENT
Start: 2023-12-10 | End: 2023-12-17

## 2023-12-10 RX ORDER — SODIUM CHLORIDE 9 MG/ML
INJECTION, SOLUTION INTRAVENOUS CONTINUOUS
Status: DISCONTINUED | OUTPATIENT
Start: 2023-12-10 | End: 2023-12-10

## 2023-12-10 RX ORDER — SODIUM CHLORIDE 9 MG/ML
INJECTION, SOLUTION INTRAVENOUS CONTINUOUS
Status: DISCONTINUED | OUTPATIENT
Start: 2023-12-10 | End: 2023-12-12

## 2023-12-10 RX ORDER — POLYETHYLENE GLYCOL 3350 17 G/17G
17 POWDER, FOR SOLUTION ORAL DAILY PRN
Status: DISCONTINUED | OUTPATIENT
Start: 2023-12-10 | End: 2024-01-08 | Stop reason: HOSPADM

## 2023-12-10 RX ORDER — SODIUM CHLORIDE 0.9 % (FLUSH) 0.9 %
5-40 SYRINGE (ML) INJECTION EVERY 12 HOURS SCHEDULED
Status: DISCONTINUED | OUTPATIENT
Start: 2023-12-10 | End: 2024-01-08 | Stop reason: HOSPADM

## 2023-12-10 RX ORDER — POTASSIUM CHLORIDE 20 MEQ/1
40 TABLET, EXTENDED RELEASE ORAL PRN
Status: DISCONTINUED | OUTPATIENT
Start: 2023-12-10 | End: 2024-01-08 | Stop reason: HOSPADM

## 2023-12-10 RX ORDER — MAGNESIUM SULFATE IN WATER 40 MG/ML
2000 INJECTION, SOLUTION INTRAVENOUS PRN
Status: DISCONTINUED | OUTPATIENT
Start: 2023-12-10 | End: 2024-01-08 | Stop reason: HOSPADM

## 2023-12-10 RX ORDER — 0.9 % SODIUM CHLORIDE 0.9 %
500 INTRAVENOUS SOLUTION INTRAVENOUS ONCE
Status: COMPLETED | OUTPATIENT
Start: 2023-12-10 | End: 2023-12-10

## 2023-12-10 RX ORDER — ONDANSETRON 2 MG/ML
4 INJECTION INTRAMUSCULAR; INTRAVENOUS EVERY 6 HOURS PRN
Status: DISCONTINUED | OUTPATIENT
Start: 2023-12-10 | End: 2023-12-17

## 2023-12-10 RX ADMIN — SODIUM CHLORIDE 400 ML: 9 INJECTION, SOLUTION INTRAVENOUS at 10:40

## 2023-12-10 RX ADMIN — SODIUM CHLORIDE 500 ML: 9 INJECTION, SOLUTION INTRAVENOUS at 07:36

## 2023-12-10 RX ADMIN — IOPAMIDOL 75 ML: 755 INJECTION, SOLUTION INTRAVENOUS at 08:18

## 2023-12-10 RX ADMIN — WARFARIN SODIUM 2 MG: 2 TABLET ORAL at 18:25

## 2023-12-10 RX ADMIN — CEFTRIAXONE SODIUM 1000 MG: 1 INJECTION, POWDER, FOR SOLUTION INTRAMUSCULAR; INTRAVENOUS at 09:24

## 2023-12-10 RX ADMIN — REMDESIVIR 200 MG: 100 INJECTION, POWDER, LYOPHILIZED, FOR SOLUTION INTRAVENOUS at 12:57

## 2023-12-10 RX ADMIN — SODIUM CHLORIDE, PRESERVATIVE FREE 10 ML: 5 INJECTION INTRAVENOUS at 10:52

## 2023-12-10 RX ADMIN — POTASSIUM CHLORIDE 40 MEQ: 1500 TABLET, EXTENDED RELEASE ORAL at 18:25

## 2023-12-10 RX ADMIN — DEXAMETHASONE 6 MG: 4 TABLET ORAL at 21:44

## 2023-12-10 RX ADMIN — AZITHROMYCIN MONOHYDRATE 500 MG: 500 INJECTION, POWDER, LYOPHILIZED, FOR SOLUTION INTRAVENOUS at 10:44

## 2023-12-10 RX ADMIN — SODIUM CHLORIDE: 9 INJECTION, SOLUTION INTRAVENOUS at 12:54

## 2023-12-10 ASSESSMENT — LIFESTYLE VARIABLES
HOW MANY STANDARD DRINKS CONTAINING ALCOHOL DO YOU HAVE ON A TYPICAL DAY: 1 OR 2
HOW OFTEN DO YOU HAVE A DRINK CONTAINING ALCOHOL: 4 OR MORE TIMES A WEEK

## 2023-12-10 ASSESSMENT — PAIN SCALES - GENERAL: PAINLEVEL_OUTOF10: 0

## 2023-12-10 NOTE — ED NOTES
The following labs were labeled with appropriate pt sticker and tubed to lab: by me    [] Blue     [] Lavender   [] on ice  [] Green/yellow  [x] Green/black [] on ice  [] Grey  [] on ice  [] Yellow  [] Red  [] Pink  [] Type/ Screen  [] ABG  [] VBG    [] COVID-19 swab    [] Rapid  [] PCR  [] Flu swab  [] Peds Viral Panel     [] Urine Sample  [] Fecal Sample  [] Pelvic Cultures  [] Blood Cultures  [] X 2  [] STREP Cultures  [] Wound Cultures       Alisson Roman, RN  12/10/23 5355

## 2023-12-10 NOTE — ED PROVIDER NOTES
[facility] ED  Emergency Department Encounter  Emergency Medicine Resident     Pt Name:Caio Arce  MRN: [id number]  Birthdate 1940  Date of evaluation: 12/10/23  PCP:  Jaqui Watkins PA-C  Note Started: 7:00 AM EST      CHIEF COMPLAINT       Chief Complaint   Patient presents with    Diarrhea     X3 weeks, only during the day, not at night, denies emesis       HISTORY OF PRESENT ILLNESS  (Location/Symptom, Timing/Onset, Context/Setting, Quality, Duration, Modifying Factors, Severity.)      Caio Arce is a 83 y.o. male who presents with diarrhea, myalgia, arthralgia, runny nose congestion, productive cough as well as generalized fatigue has been going on for the past few days.  Past medical history significant for BPH, COPD, hypertension, hyperlipidemia.  Patient is on warfarin as well.  Has a history of pacemaker placement back in 2014.  Patient states has had diarrhea has been off and on for the past 3 weeks but getting worse.  Patient has been having urinary incontinence as well as fecal incontinence and that he cannot make it to the bathroom in time.  Denies any melanotic or hematochezia stool.  States he had some runny nose and congestion as well.  Patient states had generalized fatigue but denies any chest pain or shortness of breath.  Is unsure what his last INR was.  No known sick contacts.  He has had a cough has been productive.  Patient also states he had some right-sided abdominal pain this been intermittent.  States he has feeling of masses and there.  States he went to talk to his primary care provider at that next time he had an appointment    PAST MEDICAL / SURGICAL / SOCIAL / FAMILY HISTORY      has a past medical history of Anxiety, Arthritis, BPH (benign prostatic hyperplasia), Constipation, COPD (chronic obstructive pulmonary disease) (HCC), Current use of aspirin, Hyperglycemia, Hyperlipidemia, Hypertension, MVP (mitral valve prolapse), Osteoarthritis,

## 2023-12-10 NOTE — ED NOTES
The following labs were labeled with appropriate pt sticker and tubed to lab: by me    [] Blue     [] Lavender   [] on ice  [x] Green/yellow #2 Trop  [] Green/black [] on ice  [] Grey  [] on ice  [] Yellow  [] Red  [] Pink  [] Type/ Screen  [] ABG  [] VBG    [] COVID-19 swab    [] Rapid  [] PCR  [] Flu swab  [] Peds Viral Panel     [] Urine Sample  [] Fecal Sample  [] Pelvic Cultures  [] Blood Cultures  [] X 2  [] STREP Cultures  [] Wound Cultures       Alisson Roman RN  12/10/23 9032

## 2023-12-10 NOTE — ED NOTES
ED to inpatient nurses report      Chief Complaint:  Chief Complaint   Patient presents with    Diarrhea     X3 weeks, only during the day, not at night, denies emesis     Present to ED from: home    MOA:     LOC: alert and orientated to name, place, date  Mobility: Requires assistance * 1  Oxygen Baseline: room air    Current needs required: room air   Pending ED orders: Admission bed  Present condition: Conginues to feel generally weak, Covid +, no diarrhea since here    Why did the patient come to the ED? Diarrhea x3 weeks with increasing generalized weakness  What is the plan? Admit, Treat for sepsis, Internal and Cardiology consults, AB  Any procedures or intervention occur? Fluid bolus and continuous, Monitor, Labs, IV, Blood cultures, Antibiotics  Any safety concerns Will need 1 assist    Mental Status:  Level of Consciousness: Alert (0)    Psych Assessment:   Psychosocial  Psychosocial (WDL): Within Defined Limits  Vital signs   Vitals:    12/10/23 0656   BP: (!) 108/56   Pulse: 61   Resp: 16   Temp: 97.8 °F (36.6 °C)   TempSrc: Oral   Weight: 72.6 kg (160 lb)   Height: 1.753 m (5' 9\")        Vitals:  Patient Vitals for the past 24 hrs:   BP Temp Temp src Pulse Resp Height Weight   12/10/23 0656 (!) 108/56 97.8 °F (36.6 °C) Oral 61 16 1.753 m (5' 9\") 72.6 kg (160 lb)      Visit Vitals  BP (!) 108/56   Pulse 61   Temp 97.8 °F (36.6 °C) (Oral)   Resp 16   Ht 1.753 m (5' 9\")   Wt 72.6 kg (160 lb)   BMI 23.63 kg/m²        LDAs:   Peripheral IV 12/10/23 Right Antecubital (Active)   Site Assessment Clean, dry & intact 12/10/23 0704   Line Status Blood return noted;Specimen collected;Flushed;Normal saline locked 12/10/23 0704   Line Care Connections checked and tightened 12/10/23 0704   Phlebitis Assessment No symptoms 12/10/23 0704   Infiltration Assessment 0 12/10/23 0704   Alcohol Cap Used No 12/10/23 0704   Dressing Status New dressing applied;Clean, dry & intact 12/10/23 0704   Dressing Type Transparent

## 2023-12-10 NOTE — ED PROVIDER NOTES
[facility] ED     Emergency Department     Faculty Attestation    I performed a history and physical examination of the patient and discussed management with the resident. I reviewed the resident’s note and agree with the documented findings and plan of care. Any areas of disagreement are noted on the chart. I was personally present for the key portions of any procedures. I have documented in the chart those procedures where I was not present during the key portions. I have reviewed the emergency nurses triage note. I agree with the chief complaint, past medical history, past surgical history, allergies, medications, social and family history as documented unless otherwise noted below. For Physician Assistant/ Nurse Practitioner cases/documentation I have personally evaluated this patient and have completed at least one if not all key elements of the E/M (history, physical exam, and MDM). Additional findings are as noted.    Note Started: 7:25 AM EST    Patient with multiple concerns increasing fatigue decreased oral intake dehydration significant diarrhea.  No abdominal pain with this.  Some nausea but no vomiting.  Just no appetite.  Independent history also obtained from wife.  No chest pain no shortness of breath just fatigue.  On exam appears ill but nontoxic.  Pale dry mucous membranes.  Abdomen soft no focal tenderness rebound or guarding.  Strong pulses.  Will proceed with full workup interrogate device plan for CT admit    EKG interpretation: Ventricular paced rhythm with rate of 60.  Wide QRS at 150 with a left axis.  No acute ST or T changes.  However there are for underlying P waves concerning for atrial flutter with a rate of 300.  Prior EKG shows dual AV paced rhythm.  Will interrogate device    Critical Care     none    Ryan Limon MD, FACEP, FAAEM  Attending Emergency  Physician           Ryan Limon MD  12/10/23 6625

## 2023-12-10 NOTE — ED NOTES
Report received from Mary GARCIA.  This caregiver met patient, resting quietly, no new change in status.       Alisson Roman, RN  12/10/23 9803

## 2023-12-10 NOTE — ED NOTES
Patient resting quietly  EKG completed   Noted ventricular pacing with capture  Sarita given to patient and wife  On complete monitor     Alisson Roman RN  12/10/23 3451

## 2023-12-10 NOTE — ED TRIAGE NOTES
Pt moved to ED 6 via stretcher by M9.  Pt is a/o x4.  Pt stated he has had diarrhea on and off for the past three weeks.  Pt stated the diarrhea is only during the day, not at night.  Pt stated he lives at home with his wife, who was not able to help him today.  Pt stated he has not been able to shower because he worries he will fall and she wont be able to help him up.  Pt c/o chronic pain to his LUE.  Pt placed on full cardiac monitor.  Vitals assessed and noted.  IV established, labs obtained.  Call light provided.  NAD noted.  Care ongoing.

## 2023-12-10 NOTE — ED NOTES
The following labs were labeled with appropriate pt sticker and tubed to lab: by me    [] Blue     [] Lavender   [] on ice  [] Green/yellow  [x] Green/black [] on ice  [] Grey  [] on ice  [] Yellow  [] Red  [] Pink  [] Type/ Screen  [] ABG  [] VBG    [] COVID-19 swab    [] Rapid  [] PCR  [] Flu swab  [] Peds Viral Panel     [] Urine Sample  [] Fecal Sample  [] Pelvic Cultures  [x] Blood Cultures  [x] X 2 right AC and left f/a  [] STREP Cultures  [] Wound Cultures       Alisson Roman, RN  12/10/23 9921

## 2023-12-10 NOTE — ED NOTES
The following labs were obtained and labeled with appropriate pt sticker by Mary GARCIA and tubed to lab: by me    [x] Blue     [x] Lavender   [] on ice  [x] Green/yellow  [x] Green/black [] on ice  [] Grey  [] on ice  [] Yellow  [] Red  [] Pink  [] Type/ Screen  [] ABG  [] VBG    [] COVID-19 swab    [] Rapid  [] PCR  [] Flu swab  [x] Peds Viral Panel with covid (by me)    [] Urine Sample  [] Fecal Sample  [] Pelvic Cultures  [] Blood Cultures  [] X 2  [] STREP Cultures  [] Wound Cultures       Alisson Roamn RN  12/10/23 5355

## 2023-12-11 ENCOUNTER — APPOINTMENT (OUTPATIENT)
Age: 83
DRG: 208 | End: 2023-12-11
Payer: MEDICARE

## 2023-12-11 LAB
ANION GAP SERPL CALCULATED.3IONS-SCNC: 12 MMOL/L (ref 9–17)
BACTERIA URNS QL MICRO: ABNORMAL
BASOPHILS # BLD: <0.03 K/UL (ref 0–0.2)
BASOPHILS NFR BLD: 0 % (ref 0–2)
BILIRUB UR QL STRIP: NEGATIVE
BUN SERPL-MCNC: 40 MG/DL (ref 8–23)
CALCIUM SERPL-MCNC: 8.2 MG/DL (ref 8.6–10.4)
CASTS #/AREA URNS LPF: ABNORMAL /LPF (ref 0–8)
CHLORIDE SERPL-SCNC: 108 MMOL/L (ref 98–107)
CLARITY UR: CLEAR
CO2 SERPL-SCNC: 20 MMOL/L (ref 20–31)
COLOR UR: YELLOW
CREAT SERPL-MCNC: 1 MG/DL (ref 0.7–1.2)
CREAT UR-MCNC: 125.7 MG/DL (ref 39–259)
CRP SERPL HS-MCNC: 118.4 MG/L (ref 0–5)
ECHO AO ROOT DIAM: 3.4 CM
ECHO AO ROOT INDEX: 1.81 CM/M2
ECHO AR MAX VEL PISA: 4.4 M/S
ECHO AV AREA PEAK VELOCITY: 2.1 CM2
ECHO AV AREA VTI: 2.2 CM2
ECHO AV AREA/BSA PEAK VELOCITY: 1.1 CM2/M2
ECHO AV AREA/BSA VTI: 1.2 CM2/M2
ECHO AV MEAN GRADIENT: 4 MMHG
ECHO AV MEAN VELOCITY: 0.9 M/S
ECHO AV PEAK GRADIENT: 11 MMHG
ECHO AV PEAK VELOCITY: 1.7 M/S
ECHO AV REGURGITANT PHT: 654 MS
ECHO AV VELOCITY RATIO: 0.59
ECHO AV VTI: 26.5 CM
ECHO BSA: 1.88 M2
ECHO EST RA PRESSURE: 0 MMHG
ECHO LA AREA 2C: 24.1 CM2
ECHO LA AREA 4C: 22.4 CM2
ECHO LA DIAMETER INDEX: 2.07 CM/M2
ECHO LA DIAMETER: 3.9 CM
ECHO LA MAJOR AXIS: 6 CM
ECHO LA MINOR AXIS: 6.3 CM
ECHO LA TO AORTIC ROOT RATIO: 1.15
ECHO LA VOL BP: 71 ML (ref 18–58)
ECHO LA VOL MOD A2C: 75 ML (ref 18–58)
ECHO LA VOL MOD A4C: 65 ML (ref 18–58)
ECHO LA VOL/BSA BIPLANE: 38 ML/M2 (ref 16–34)
ECHO LA VOLUME INDEX MOD A2C: 40 ML/M2 (ref 16–34)
ECHO LA VOLUME INDEX MOD A4C: 35 ML/M2 (ref 16–34)
ECHO LV EDV A2C: 46 ML
ECHO LV EDV A4C: 64 ML
ECHO LV EDV INDEX A4C: 34 ML/M2
ECHO LV EDV NDEX A2C: 24 ML/M2
ECHO LV EJECTION FRACTION A2C: 79 %
ECHO LV EJECTION FRACTION A4C: 33 %
ECHO LV EJECTION FRACTION BIPLANE: 59 % (ref 55–100)
ECHO LV ESV A2C: 10 ML
ECHO LV ESV A4C: 43 ML
ECHO LV ESV INDEX A2C: 5 ML/M2
ECHO LV ESV INDEX A4C: 23 ML/M2
ECHO LV FRACTIONAL SHORTENING: 28 % (ref 28–44)
ECHO LV INTERNAL DIMENSION DIASTOLE INDEX: 2.13 CM/M2
ECHO LV INTERNAL DIMENSION DIASTOLIC: 4 CM (ref 4.2–5.9)
ECHO LV INTERNAL DIMENSION SYSTOLIC INDEX: 1.54 CM/M2
ECHO LV INTERNAL DIMENSION SYSTOLIC: 2.9 CM
ECHO LV IVSD: 1.3 CM (ref 0.6–1)
ECHO LV MASS 2D: 175.8 G (ref 88–224)
ECHO LV MASS INDEX 2D: 93.5 G/M2 (ref 49–115)
ECHO LV POSTERIOR WALL DIASTOLIC: 1.2 CM (ref 0.6–1)
ECHO LV RELATIVE WALL THICKNESS RATIO: 0.6
ECHO LVOT AREA: 3.5 CM2
ECHO LVOT AV VTI INDEX: 0.65
ECHO LVOT DIAM: 2.1 CM
ECHO LVOT MEAN GRADIENT: 2 MMHG
ECHO LVOT PEAK GRADIENT: 4 MMHG
ECHO LVOT PEAK VELOCITY: 1 M/S
ECHO LVOT STROKE VOLUME INDEX: 31.5 ML/M2
ECHO LVOT SV: 59.2 ML
ECHO LVOT VTI: 17.1 CM
ECHO MV A VELOCITY: 0.71 M/S
ECHO MV AREA VTI: 1.6 CM2
ECHO MV E DECELERATION TIME (DT): 160 MS
ECHO MV E VELOCITY: 1.46 M/S
ECHO MV E/A RATIO: 2.06
ECHO MV LVOT VTI INDEX: 2.11
ECHO MV MAX VELOCITY: 2 M/S
ECHO MV MEAN GRADIENT: 5 MMHG
ECHO MV MEAN VELOCITY: 1 M/S
ECHO MV PEAK GRADIENT: 15 MMHG
ECHO MV VTI: 36 CM
ECHO PV MAX VELOCITY: 1 M/S
ECHO PV PEAK GRADIENT: 4 MMHG
ECHO RA AREA 4C: 13.9 CM2
ECHO RIGHT VENTRICULAR SYSTOLIC PRESSURE (RVSP): 33 MMHG
ECHO RV BASAL DIMENSION: 4.2 CM
ECHO RV FREE WALL PEAK S': 10 CM/S
ECHO RV TAPSE: 1.3 CM (ref 1.7–?)
ECHO TV REGURGITANT MAX VELOCITY: 2.86 M/S
ECHO TV REGURGITANT PEAK GRADIENT: 33 MMHG
EOSINOPHIL # BLD: <0.03 K/UL (ref 0–0.44)
EOSINOPHILS RELATIVE PERCENT: 0 % (ref 1–4)
EPI CELLS #/AREA URNS HPF: ABNORMAL /HPF (ref 0–5)
ERYTHROCYTE [DISTWIDTH] IN BLOOD BY AUTOMATED COUNT: 12.9 % (ref 11.8–14.4)
GFR SERPL CREATININE-BSD FRML MDRD: >60 ML/MIN/1.73M2
GLUCOSE SERPL-MCNC: 123 MG/DL (ref 70–99)
GLUCOSE UR STRIP-MCNC: NEGATIVE MG/DL
HCT VFR BLD AUTO: 45.6 % (ref 40.7–50.3)
HGB BLD-MCNC: 15 G/DL (ref 13–17)
HGB UR QL STRIP.AUTO: ABNORMAL
IMM GRANULOCYTES # BLD AUTO: 0.03 K/UL (ref 0–0.3)
IMM GRANULOCYTES NFR BLD: 1 %
INR PPP: 3.7
KETONES UR STRIP-MCNC: ABNORMAL MG/DL
LEUKOCYTE ESTERASE UR QL STRIP: NEGATIVE
LYMPHOCYTES NFR BLD: 0.82 K/UL (ref 1.1–3.7)
LYMPHOCYTES RELATIVE PERCENT: 13 % (ref 24–43)
MCH RBC QN AUTO: 30.9 PG (ref 25.2–33.5)
MCHC RBC AUTO-ENTMCNC: 32.9 G/DL (ref 28.4–34.8)
MCV RBC AUTO: 93.8 FL (ref 82.6–102.9)
MONOCYTES NFR BLD: 0.29 K/UL (ref 0.1–1.2)
MONOCYTES NFR BLD: 5 % (ref 3–12)
MRSA, DNA, NASAL: NEGATIVE
NEUTROPHILS NFR BLD: 82 % (ref 36–65)
NEUTS SEG NFR BLD: 5.14 K/UL (ref 1.5–8.1)
NITRITE UR QL STRIP: NEGATIVE
NRBC BLD-RTO: 0 PER 100 WBC
PH UR STRIP: 5.5 [PH] (ref 5–8)
PLATELET # BLD AUTO: ABNORMAL K/UL (ref 138–453)
PLATELET, FLUORESCENCE: 96 K/UL (ref 138–453)
PLATELETS.RETICULATED NFR BLD AUTO: 7.4 % (ref 1.1–10.3)
POTASSIUM SERPL-SCNC: 4.1 MMOL/L (ref 3.7–5.3)
PROT UR STRIP-MCNC: ABNORMAL MG/DL
PROTHROMBIN TIME: 36.3 SEC (ref 11.7–14.9)
RBC # BLD AUTO: 4.86 M/UL (ref 4.21–5.77)
RBC #/AREA URNS HPF: ABNORMAL /HPF (ref 0–4)
SODIUM SERPL-SCNC: 140 MMOL/L (ref 135–144)
SODIUM UR-SCNC: 60 MMOL/L
SP GR UR STRIP: 1.03 (ref 1–1.03)
SPECIMEN DESCRIPTION: NORMAL
UROBILINOGEN UR STRIP-ACNC: NORMAL EU/DL (ref 0–1)
WBC #/AREA URNS HPF: ABNORMAL /HPF (ref 0–5)
WBC OTHER # BLD: 6.3 K/UL (ref 3.5–11.3)

## 2023-12-11 PROCEDURE — 6360000002 HC RX W HCPCS

## 2023-12-11 PROCEDURE — 2060000000 HC ICU INTERMEDIATE R&B

## 2023-12-11 PROCEDURE — 99232 SBSQ HOSP IP/OBS MODERATE 35: CPT | Performed by: INTERNAL MEDICINE

## 2023-12-11 PROCEDURE — 85025 COMPLETE CBC W/AUTO DIFF WBC: CPT

## 2023-12-11 PROCEDURE — 6360000002 HC RX W HCPCS: Performed by: NURSE PRACTITIONER

## 2023-12-11 PROCEDURE — 93306 TTE W/DOPPLER COMPLETE: CPT

## 2023-12-11 PROCEDURE — 2580000003 HC RX 258: Performed by: NURSE PRACTITIONER

## 2023-12-11 PROCEDURE — 2580000003 HC RX 258

## 2023-12-11 PROCEDURE — 85610 PROTHROMBIN TIME: CPT

## 2023-12-11 PROCEDURE — 80048 BASIC METABOLIC PNL TOTAL CA: CPT

## 2023-12-11 PROCEDURE — 93306 TTE W/DOPPLER COMPLETE: CPT | Performed by: INTERNAL MEDICINE

## 2023-12-11 PROCEDURE — 99222 1ST HOSP IP/OBS MODERATE 55: CPT | Performed by: INTERNAL MEDICINE

## 2023-12-11 PROCEDURE — 99233 SBSQ HOSP IP/OBS HIGH 50: CPT | Performed by: INTERNAL MEDICINE

## 2023-12-11 PROCEDURE — 93005 ELECTROCARDIOGRAM TRACING: CPT

## 2023-12-11 PROCEDURE — 36415 COLL VENOUS BLD VENIPUNCTURE: CPT

## 2023-12-11 PROCEDURE — 87086 URINE CULTURE/COLONY COUNT: CPT

## 2023-12-11 PROCEDURE — 85055 RETICULATED PLATELET ASSAY: CPT

## 2023-12-11 PROCEDURE — 6360000002 HC RX W HCPCS: Performed by: INTERNAL MEDICINE

## 2023-12-11 PROCEDURE — 86140 C-REACTIVE PROTEIN: CPT

## 2023-12-11 RX ORDER — HALOPERIDOL 5 MG/ML
1 INJECTION INTRAMUSCULAR EVERY 6 HOURS PRN
Status: DISCONTINUED | OUTPATIENT
Start: 2023-12-11 | End: 2023-12-13

## 2023-12-11 RX ADMIN — DEXAMETHASONE 6 MG: 4 TABLET ORAL at 08:13

## 2023-12-11 RX ADMIN — REMDESIVIR 100 MG: 100 INJECTION, POWDER, LYOPHILIZED, FOR SOLUTION INTRAVENOUS at 11:24

## 2023-12-11 RX ADMIN — WATER 5 MG: 1 INJECTION INTRAMUSCULAR; INTRAVENOUS; SUBCUTANEOUS at 16:10

## 2023-12-11 RX ADMIN — SODIUM CHLORIDE: 9 INJECTION, SOLUTION INTRAVENOUS at 21:21

## 2023-12-11 RX ADMIN — HALOPERIDOL LACTATE 1 MG: 5 INJECTION, SOLUTION INTRAMUSCULAR at 20:30

## 2023-12-11 NOTE — CARE COORDINATION
Case Management Assessment  Initial Evaluation    Date/Time of Evaluation: 12/11/2023 5:36 PM  Assessment Completed by: Alem Salinas RN    If patient is discharged prior to next notation, then this note serves as note for discharge by case management.    Patient Name: Caio Arce                   YOB: 1940  Diagnosis: DAMARIS (acute kidney injury) (HCC) [N17.9]  Pneumonia due to COVID-19 virus [U07.1, J12.82]  COVID-19 [U07.1]                   Date / Time: 12/10/2023  6:59 AM    Patient Admission Status: Inpatient   Readmission Risk (Low < 19, Mod (19-27), High > 27): Readmission Risk Score: 11.3    Current PCP: Jaqui Watkins PA-C  PCP verified by CM? Yes    Chart Reviewed: Yes      History Provided by: Patient, Spouse  Patient Orientation: Alert and Oriented, Person, Place    Patient Cognition: Other (see comment) (confused)    Hospitalization in the last 30 days (Readmission):  No    If yes, Readmission Assessment in  Navigator will be completed.    Advance Directives:      Code Status: Full Code   Patient's Primary Decision Maker is: Legal Next of Kin      Discharge Planning:    Patient lives with: Spouse/Significant Other Type of Home: House (2 story duplex. lives in bottom half)  Primary Care Giver: Self  Patient Support Systems include: Spouse/Significant Other, Children   Current Financial resources: Medicare  Current community resources:    Current services prior to admission: Durable Medical Equipment            Current DME: [employer], [employer]            Type of Home Care services:  None    ADLS  Prior functional level: Independent in ADLs/IADLs  Current functional level: Assistance with the following:    PT AM-PAC:   /24  OT AM-PAC:   /24    Family can provide assistance at DC: Yes  Would you like Case Management to discuss the discharge plan with any other family members/significant others, and if so, who? Yes (spouse)  Plans to Return to Present Housing: Yes  Other Identified

## 2023-12-11 NOTE — CARE COORDINATION
Attempted to meet with patient to complete initial case management assessment but patient is not completely alert and oriented. No family at bedside

## 2023-12-12 PROBLEM — Z95.2 H/O MITRAL VALVE REPLACEMENT WITH MECHANICAL VALVE: Status: ACTIVE | Noted: 2023-12-12

## 2023-12-12 PROBLEM — M19.90 OSTEOARTHRITIS: Status: RESOLVED | Noted: 2020-11-03 | Resolved: 2020-11-03

## 2023-12-12 PROBLEM — N17.9 AKI (ACUTE KIDNEY INJURY) (HCC): Status: RESOLVED | Noted: 2023-12-10 | Resolved: 2023-12-12

## 2023-12-12 PROBLEM — R09.02 HYPOXIA: Status: RESOLVED | Noted: 2023-12-10 | Resolved: 2023-12-12

## 2023-12-12 LAB
ANION GAP SERPL CALCULATED.3IONS-SCNC: 11 MMOL/L (ref 9–17)
ANION GAP SERPL CALCULATED.3IONS-SCNC: 12 MMOL/L (ref 9–17)
BASOPHILS # BLD: 0.03 K/UL (ref 0–0.2)
BASOPHILS NFR BLD: 0 % (ref 0–2)
BUN SERPL-MCNC: 40 MG/DL (ref 8–23)
BUN/CREAT SERPL: 44 (ref 9–20)
C DIFF GDH + TOXINS A+B STL QL IA.RAPID: NEGATIVE
CALCIUM SERPL-MCNC: 8.5 MG/DL (ref 8.6–10.4)
CAMPYLOBACTER DNA SPEC NAA+PROBE: NORMAL
CHLORIDE SERPL-SCNC: 116 MMOL/L (ref 98–107)
CHLORIDE SERPL-SCNC: 117 MMOL/L (ref 98–107)
CO2 SERPL-SCNC: 18 MMOL/L (ref 20–31)
CO2 SERPL-SCNC: 19 MMOL/L (ref 20–31)
CREAT SERPL-MCNC: 0.9 MG/DL (ref 0.7–1.2)
CRP SERPL HS-MCNC: 61 MG/L (ref 0–5)
EKG ATRIAL RATE: 286 BPM
EKG ATRIAL RATE: 50 BPM
EKG Q-T INTERVAL: 504 MS
EKG Q-T INTERVAL: 512 MS
EKG QRS DURATION: 150 MS
EKG QRS DURATION: 150 MS
EKG QTC CALCULATION (BAZETT): 504 MS
EKG QTC CALCULATION (BAZETT): 515 MS
EKG R AXIS: -65 DEGREES
EKG R AXIS: -73 DEGREES
EKG T AXIS: 125 DEGREES
EKG T AXIS: 92 DEGREES
EKG VENTRICULAR RATE: 60 BPM
EKG VENTRICULAR RATE: 61 BPM
EOSINOPHIL # BLD: <0.03 K/UL (ref 0–0.44)
EOSINOPHILS RELATIVE PERCENT: 0 % (ref 1–4)
ERYTHROCYTE [DISTWIDTH] IN BLOOD BY AUTOMATED COUNT: 13.1 % (ref 11.8–14.4)
ETEC ELTA+ESTB GENES STL QL NAA+PROBE: NORMAL
GFR SERPL CREATININE-BSD FRML MDRD: >60 ML/MIN/1.73M2
GLUCOSE SERPL-MCNC: 129 MG/DL (ref 70–99)
HCT VFR BLD AUTO: 42.6 % (ref 40.7–50.3)
HCT VFR BLD AUTO: 45.2 % (ref 40.7–50.3)
HGB BLD-MCNC: 14.1 G/DL (ref 13–17)
HGB BLD-MCNC: 14.4 G/DL (ref 13–17)
IMM GRANULOCYTES # BLD AUTO: 0.34 K/UL (ref 0–0.3)
IMM GRANULOCYTES NFR BLD: 4 %
INR PPP: 4.2
INR PPP: 4.7
LYMPHOCYTES NFR BLD: 1.11 K/UL (ref 1.1–3.7)
LYMPHOCYTES RELATIVE PERCENT: 13 % (ref 24–43)
MAGNESIUM SERPL-MCNC: 2.5 MG/DL (ref 1.6–2.6)
MCH RBC QN AUTO: 30.7 PG (ref 25.2–33.5)
MCHC RBC AUTO-ENTMCNC: 33.1 G/DL (ref 28.4–34.8)
MCV RBC AUTO: 92.8 FL (ref 82.6–102.9)
MICROORGANISM SPEC CULT: NO GROWTH
MONOCYTES NFR BLD: 1.1 K/UL (ref 0.1–1.2)
MONOCYTES NFR BLD: 13 % (ref 3–12)
NEUTROPHILS NFR BLD: 70 % (ref 36–65)
NEUTS SEG NFR BLD: 6.09 K/UL (ref 1.5–8.1)
NRBC BLD-RTO: 0 PER 100 WBC
P SHIGELLOIDES DNA STL QL NAA+PROBE: NORMAL
PLATELET # BLD AUTO: ABNORMAL K/UL (ref 138–453)
PLATELET, FLUORESCENCE: 132 K/UL (ref 138–453)
PLATELETS.RETICULATED NFR BLD AUTO: 6.5 % (ref 1.1–10.3)
POTASSIUM SERPL-SCNC: 4 MMOL/L (ref 3.7–5.3)
POTASSIUM SERPL-SCNC: 4.1 MMOL/L (ref 3.7–5.3)
PROTHROMBIN TIME: 39.9 SEC (ref 11.7–14.9)
PROTHROMBIN TIME: 43.8 SEC (ref 11.7–14.9)
RBC # BLD AUTO: 4.59 M/UL (ref 4.21–5.77)
SALMONELLA DNA SPEC QL NAA+PROBE: NORMAL
SHIGA TOXIN STX GENE SPEC NAA+PROBE: NORMAL
SHIGELLA DNA SPEC QL NAA+PROBE: NORMAL
SODIUM SERPL-SCNC: 146 MMOL/L (ref 135–144)
SODIUM SERPL-SCNC: 147 MMOL/L (ref 135–144)
SPECIMEN DESCRIPTION: NORMAL
V CHOL+PARA RFBL+TRKH+TNAA STL QL NAA+PR: NORMAL
WBC OTHER # BLD: 8.7 K/UL (ref 3.5–11.3)
Y ENTERO RECN STL QL NAA+PROBE: NORMAL

## 2023-12-12 PROCEDURE — 6360000002 HC RX W HCPCS

## 2023-12-12 PROCEDURE — 85014 HEMATOCRIT: CPT

## 2023-12-12 PROCEDURE — 93010 ELECTROCARDIOGRAM REPORT: CPT | Performed by: INTERNAL MEDICINE

## 2023-12-12 PROCEDURE — 86140 C-REACTIVE PROTEIN: CPT

## 2023-12-12 PROCEDURE — 85610 PROTHROMBIN TIME: CPT

## 2023-12-12 PROCEDURE — 6370000000 HC RX 637 (ALT 250 FOR IP): Performed by: INTERNAL MEDICINE

## 2023-12-12 PROCEDURE — 99232 SBSQ HOSP IP/OBS MODERATE 35: CPT | Performed by: INTERNAL MEDICINE

## 2023-12-12 PROCEDURE — 85025 COMPLETE CBC W/AUTO DIFF WBC: CPT

## 2023-12-12 PROCEDURE — 6360000002 HC RX W HCPCS: Performed by: INTERNAL MEDICINE

## 2023-12-12 PROCEDURE — 83735 ASSAY OF MAGNESIUM: CPT

## 2023-12-12 PROCEDURE — 36415 COLL VENOUS BLD VENIPUNCTURE: CPT

## 2023-12-12 PROCEDURE — 6360000002 HC RX W HCPCS: Performed by: NURSE PRACTITIONER

## 2023-12-12 PROCEDURE — 2060000000 HC ICU INTERMEDIATE R&B

## 2023-12-12 PROCEDURE — 2580000003 HC RX 258: Performed by: NURSE PRACTITIONER

## 2023-12-12 PROCEDURE — 85018 HEMOGLOBIN: CPT

## 2023-12-12 PROCEDURE — 80048 BASIC METABOLIC PNL TOTAL CA: CPT

## 2023-12-12 PROCEDURE — 85055 RETICULATED PLATELET ASSAY: CPT

## 2023-12-12 PROCEDURE — 80051 ELECTROLYTE PANEL: CPT

## 2023-12-12 PROCEDURE — 2580000003 HC RX 258

## 2023-12-12 RX ORDER — LANOLIN ALCOHOL/MO/W.PET/CERES
6 CREAM (GRAM) TOPICAL NIGHTLY PRN
Status: DISCONTINUED | OUTPATIENT
Start: 2023-12-12 | End: 2023-12-13

## 2023-12-12 RX ORDER — QUETIAPINE FUMARATE 25 MG/1
25 TABLET, FILM COATED ORAL NIGHTLY PRN
Status: DISCONTINUED | OUTPATIENT
Start: 2023-12-12 | End: 2023-12-13

## 2023-12-12 RX ORDER — ALBUTEROL SULFATE 2.5 MG/3ML
2.5 SOLUTION RESPIRATORY (INHALATION)
Status: DISCONTINUED | OUTPATIENT
Start: 2023-12-12 | End: 2023-12-16

## 2023-12-12 RX ADMIN — HALOPERIDOL LACTATE 1 MG: 5 INJECTION, SOLUTION INTRAMUSCULAR at 07:54

## 2023-12-12 RX ADMIN — SODIUM CHLORIDE, PRESERVATIVE FREE 10 ML: 5 INJECTION INTRAVENOUS at 07:54

## 2023-12-12 RX ADMIN — HALOPERIDOL LACTATE 1 MG: 5 INJECTION, SOLUTION INTRAMUSCULAR at 11:44

## 2023-12-12 RX ADMIN — DEXAMETHASONE 6 MG: 4 TABLET ORAL at 07:54

## 2023-12-12 RX ADMIN — HALOPERIDOL LACTATE 1 MG: 5 INJECTION, SOLUTION INTRAMUSCULAR at 18:13

## 2023-12-12 RX ADMIN — REMDESIVIR 100 MG: 100 INJECTION, POWDER, LYOPHILIZED, FOR SOLUTION INTRAVENOUS at 11:43

## 2023-12-12 RX ADMIN — SODIUM CHLORIDE, PRESERVATIVE FREE 10 ML: 5 INJECTION INTRAVENOUS at 20:48

## 2023-12-13 LAB
ALLEN TEST: POSITIVE
ANION GAP SERPL CALCULATED.3IONS-SCNC: 12 MMOL/L (ref 9–17)
BASOPHILS # BLD: 0 K/UL (ref 0–0.2)
BASOPHILS NFR BLD: 0 % (ref 0–2)
BUN BLD-MCNC: 34 MG/DL (ref 8–26)
BUN SERPL-MCNC: 39 MG/DL (ref 8–23)
CA-I BLD-SCNC: 1.15 MMOL/L (ref 1.15–1.33)
CALCIUM SERPL-MCNC: 8.6 MG/DL (ref 8.6–10.4)
CHLORIDE BLD-SCNC: 121 MMOL/L (ref 98–107)
CHLORIDE SERPL-SCNC: 116 MMOL/L (ref 98–107)
CO2 BLD CALC-SCNC: 19 MMOL/L (ref 22–30)
CO2 SERPL-SCNC: 17 MMOL/L (ref 20–31)
CREAT SERPL-MCNC: 0.8 MG/DL (ref 0.7–1.2)
CRP SERPL HS-MCNC: 28.9 MG/L (ref 0–5)
EGFR, POC: >60 ML/MIN/1.73M2
EOSINOPHIL # BLD: 0 K/UL (ref 0–0.4)
EOSINOPHILS RELATIVE PERCENT: 0 % (ref 1–4)
ERYTHROCYTE [DISTWIDTH] IN BLOOD BY AUTOMATED COUNT: 13.6 % (ref 11.8–14.4)
FIO2: 28
GFR SERPL CREATININE-BSD FRML MDRD: >60 ML/MIN/1.73M2
GLUCOSE BLD-MCNC: 141 MG/DL (ref 74–100)
GLUCOSE SERPL-MCNC: 136 MG/DL (ref 70–99)
HCT VFR BLD AUTO: 41 % (ref 41–53)
HCT VFR BLD AUTO: 43.9 % (ref 40.7–50.3)
HGB BLD-MCNC: 14.3 G/DL (ref 13–17)
IMM GRANULOCYTES # BLD AUTO: 0.15 K/UL (ref 0–0.3)
IMM GRANULOCYTES NFR BLD: 1 %
INR PPP: 3.9
LYMPHOCYTES NFR BLD: 2.11 K/UL (ref 1–4.8)
LYMPHOCYTES RELATIVE PERCENT: 14 % (ref 24–44)
MCH RBC QN AUTO: 31 PG (ref 25.2–33.5)
MCHC RBC AUTO-ENTMCNC: 32.6 G/DL (ref 28.4–34.8)
MCV RBC AUTO: 95.2 FL (ref 82.6–102.9)
MONOCYTES NFR BLD: 0.6 K/UL (ref 0.1–0.8)
MONOCYTES NFR BLD: 4 % (ref 1–7)
MORPHOLOGY: NORMAL
NEGATIVE BASE EXCESS, ART: 3.9 MMOL/L (ref 0–2)
NEUTROPHILS NFR BLD: 81 % (ref 36–66)
NEUTS SEG NFR BLD: 12.24 K/UL (ref 1.8–7.7)
NRBC BLD-RTO: 0 PER 100 WBC
O2 DELIVERY DEVICE: ABNORMAL
PLATELET # BLD AUTO: 231 K/UL (ref 138–453)
PMV BLD AUTO: 11.6 FL (ref 8.1–13.5)
POC ANION GAP: 10 MMOL/L (ref 7–16)
POC CREATININE: 0.9 MG/DL (ref 0.51–1.19)
POC HCO3: 19 MMOL/L (ref 21–28)
POC HEMOGLOBIN (CALC): 13.9 G/DL (ref 13.5–17.5)
POC LACTIC ACID: 3.6 MMOL/L (ref 0.56–1.39)
POC O2 SATURATION: 98.1 % (ref 94–98)
POC PCO2: 28.3 MM HG (ref 35–48)
POC PH: 7.43 (ref 7.35–7.45)
POC PO2: 100.8 MM HG (ref 83–108)
POTASSIUM BLD-SCNC: 4 MMOL/L (ref 3.5–4.5)
POTASSIUM SERPL-SCNC: 5 MMOL/L (ref 3.7–5.3)
PROTHROMBIN TIME: 37.9 SEC (ref 11.7–14.9)
RBC # BLD AUTO: 4.61 M/UL (ref 4.21–5.77)
SAMPLE SITE: ABNORMAL
SODIUM BLD-SCNC: 149 MMOL/L (ref 138–146)
SODIUM SERPL-SCNC: 145 MMOL/L (ref 135–144)
WBC OTHER # BLD: 15.1 K/UL (ref 3.5–11.3)

## 2023-12-13 PROCEDURE — 2700000000 HC OXYGEN THERAPY PER DAY

## 2023-12-13 PROCEDURE — 84520 ASSAY OF UREA NITROGEN: CPT

## 2023-12-13 PROCEDURE — 2580000003 HC RX 258

## 2023-12-13 PROCEDURE — 6360000002 HC RX W HCPCS

## 2023-12-13 PROCEDURE — 80048 BASIC METABOLIC PNL TOTAL CA: CPT

## 2023-12-13 PROCEDURE — 82803 BLOOD GASES ANY COMBINATION: CPT

## 2023-12-13 PROCEDURE — 85014 HEMATOCRIT: CPT

## 2023-12-13 PROCEDURE — 99232 SBSQ HOSP IP/OBS MODERATE 35: CPT | Performed by: INTERNAL MEDICINE

## 2023-12-13 PROCEDURE — 97530 THERAPEUTIC ACTIVITIES: CPT

## 2023-12-13 PROCEDURE — 2580000003 HC RX 258: Performed by: NURSE PRACTITIONER

## 2023-12-13 PROCEDURE — 97166 OT EVAL MOD COMPLEX 45 MIN: CPT

## 2023-12-13 PROCEDURE — 85025 COMPLETE CBC W/AUTO DIFF WBC: CPT

## 2023-12-13 PROCEDURE — 6370000000 HC RX 637 (ALT 250 FOR IP)

## 2023-12-13 PROCEDURE — 97535 SELF CARE MNGMENT TRAINING: CPT

## 2023-12-13 PROCEDURE — 80051 ELECTROLYTE PANEL: CPT

## 2023-12-13 PROCEDURE — 85610 PROTHROMBIN TIME: CPT

## 2023-12-13 PROCEDURE — 82947 ASSAY GLUCOSE BLOOD QUANT: CPT

## 2023-12-13 PROCEDURE — 36600 WITHDRAWAL OF ARTERIAL BLOOD: CPT

## 2023-12-13 PROCEDURE — 86140 C-REACTIVE PROTEIN: CPT

## 2023-12-13 PROCEDURE — 82330 ASSAY OF CALCIUM: CPT

## 2023-12-13 PROCEDURE — 83605 ASSAY OF LACTIC ACID: CPT

## 2023-12-13 PROCEDURE — 2060000000 HC ICU INTERMEDIATE R&B

## 2023-12-13 PROCEDURE — 36415 COLL VENOUS BLD VENIPUNCTURE: CPT

## 2023-12-13 PROCEDURE — 94761 N-INVAS EAR/PLS OXIMETRY MLT: CPT

## 2023-12-13 PROCEDURE — 82565 ASSAY OF CREATININE: CPT

## 2023-12-13 PROCEDURE — 97162 PT EVAL MOD COMPLEX 30 MIN: CPT

## 2023-12-13 PROCEDURE — 6360000002 HC RX W HCPCS: Performed by: NURSE PRACTITIONER

## 2023-12-13 RX ORDER — LANOLIN ALCOHOL/MO/W.PET/CERES
6 CREAM (GRAM) TOPICAL NIGHTLY
Status: DISCONTINUED | OUTPATIENT
Start: 2023-12-13 | End: 2023-12-28

## 2023-12-13 RX ORDER — DEXAMETHASONE SODIUM PHOSPHATE 10 MG/ML
6 INJECTION INTRAMUSCULAR; INTRAVENOUS EVERY 24 HOURS
Status: DISCONTINUED | OUTPATIENT
Start: 2023-12-13 | End: 2023-12-19

## 2023-12-13 RX ADMIN — OLANZAPINE 5 MG: 10 INJECTION, POWDER, LYOPHILIZED, FOR SOLUTION INTRAMUSCULAR at 03:21

## 2023-12-13 RX ADMIN — Medication 6 MG: at 21:26

## 2023-12-13 RX ADMIN — SODIUM CHLORIDE, PRESERVATIVE FREE 10 ML: 5 INJECTION INTRAVENOUS at 21:26

## 2023-12-13 RX ADMIN — DEXAMETHASONE SODIUM PHOSPHATE 6 MG: 10 INJECTION INTRAMUSCULAR; INTRAVENOUS at 11:47

## 2023-12-13 RX ADMIN — WATER 5 MG: 1 INJECTION INTRAMUSCULAR; INTRAVENOUS; SUBCUTANEOUS at 05:03

## 2023-12-13 RX ADMIN — REMDESIVIR 100 MG: 100 INJECTION, POWDER, LYOPHILIZED, FOR SOLUTION INTRAVENOUS at 11:51

## 2023-12-13 RX ADMIN — Medication 6 MG: at 05:03

## 2023-12-13 ASSESSMENT — PAIN SCALES - WONG BAKER
WONGBAKER_NUMERICALRESPONSE: 0
WONGBAKER_NUMERICALRESPONSE: 0

## 2023-12-13 ASSESSMENT — PAIN SCALES - GENERAL
PAINLEVEL_OUTOF10: 0
PAINLEVEL_OUTOF10: 0

## 2023-12-13 NOTE — CARE COORDINATION
Rapid response called earlier this AM.  Patient now stable.    Continues with Oral Decadron remdesivir stop date 12/15   Plan remains to return home with family.  Follow closely for needs

## 2023-12-13 NOTE — RT PROTOCOL NOTE
RT Nebulizer Bronchodilator Protocol Note    There is a bronchodilator order in the chart from a provider indicating to follow the RT Bronchodilator Protocol and there is an “Initiate RT Bronchodilator Protocol” order as well (see protocol at bottom of note).    CXR Findings:  No results found.    The findings from the last RT Protocol Assessment were as follows:  Smoking: Chronic pulmonary disease  Respiratory Pattern: Regular pattern and RR 12-20 bpm  Breath Sounds: Slightly diminished and/or crackles  Cough: Strong, spontaneous, non-productive  Indication for Bronchodilator Therapy:    Bronchodilator Assessment Score: 4    Aerosolized bronchodilator medication orders have been revised according to the RT Nebulizer Bronchodilator Protocol below.    Respiratory Therapist to perform RT Therapy Protocol Assessment initially then follow the protocol.  Repeat RT Therapy Protocol Assessment PRN for score 0-3 or on second treatment, BID, and PRN for scores above 3.    No Indications - adjust the frequency to every 6 hours PRN wheezing or bronchospasm, if no treatments needed after 48 hours then discontinue using Per Protocol order mode.     If indication present, adjust the RT bronchodilator orders based on the Bronchodilator Assessment Score as indicated below.  If a patient is on this medication at home then do not decrease Frequency below that used at home.    0-3 - enter or revise RT bronchodilator order(s) to equivalent RT Bronchodilator order with Frequency of every 4 hours PRN for wheezing or increased work of breathing using Per Protocol order mode.       4-6 - enter or revise RT Bronchodilator order(s) to two equivalent RT bronchodilator orders with one order with BID Frequency and one order with Frequency of every 4 hours PRN wheezing or increased work of breathing using Per Protocol order mode.         7-10 - enter or revise RT Bronchodilator order(s) to two equivalent RT bronchodilator orders with one order

## 2023-12-14 ENCOUNTER — APPOINTMENT (OUTPATIENT)
Dept: GENERAL RADIOLOGY | Age: 83
DRG: 208 | End: 2023-12-14
Payer: MEDICARE

## 2023-12-14 PROBLEM — R41.0 DELIRIUM: Status: ACTIVE | Noted: 2023-12-14

## 2023-12-14 LAB
ANION GAP SERPL CALCULATED.3IONS-SCNC: 13 MMOL/L (ref 9–17)
ANION GAP SERPL CALCULATED.3IONS-SCNC: 14 MMOL/L (ref 9–17)
ANTI-XA UNFRAC HEPARIN: <0.1 IU/L
BASOPHILS # BLD: 0.17 K/UL (ref 0–0.2)
BASOPHILS NFR BLD: 1 % (ref 0–2)
BNP SERPL-MCNC: ABNORMAL PG/ML
BODY TEMPERATURE: 37
BODY TEMPERATURE: 37
BUN SERPL-MCNC: 63 MG/DL (ref 8–23)
CALCIUM SERPL-MCNC: 9.2 MG/DL (ref 8.6–10.4)
CHLORIDE SERPL-SCNC: 118 MMOL/L (ref 98–107)
CHLORIDE SERPL-SCNC: 118 MMOL/L (ref 98–107)
CO2 SERPL-SCNC: 22 MMOL/L (ref 20–31)
CO2 SERPL-SCNC: 22 MMOL/L (ref 20–31)
COHGB MFR BLD: 0.5 % (ref 0–5)
COHGB MFR BLD: 0.6 % (ref 0–5)
CREAT SERPL-MCNC: 1.1 MG/DL (ref 0.7–1.2)
EOSINOPHIL # BLD: 0 K/UL (ref 0–0.44)
EOSINOPHILS RELATIVE PERCENT: 0 % (ref 1–4)
ERYTHROCYTE [DISTWIDTH] IN BLOOD BY AUTOMATED COUNT: 13.6 % (ref 11.8–14.4)
FIO2 ON VENT: ABNORMAL %
FIO2 ON VENT: ABNORMAL %
GFR SERPL CREATININE-BSD FRML MDRD: >60 ML/MIN/1.73M2
GLUCOSE SERPL-MCNC: 137 MG/DL (ref 70–99)
HCO3 VENOUS: 22.5 MMOL/L (ref 24–30)
HCO3 VENOUS: 24.3 MMOL/L (ref 24–30)
HCT VFR BLD AUTO: 46.8 % (ref 40.7–50.3)
HGB BLD-MCNC: 14.8 G/DL (ref 13–17)
IMM GRANULOCYTES # BLD AUTO: 0.52 K/UL (ref 0–0.3)
IMM GRANULOCYTES NFR BLD: 3 %
INR PPP: 5
INR PPP: 5.5
LACTIC ACID, WHOLE BLOOD: 3.8 MMOL/L (ref 0.7–2.1)
LYMPHOCYTES NFR BLD: 1.21 K/UL (ref 1.1–3.7)
LYMPHOCYTES RELATIVE PERCENT: 7 % (ref 24–43)
MCH RBC QN AUTO: 31.2 PG (ref 25.2–33.5)
MCHC RBC AUTO-ENTMCNC: 31.6 G/DL (ref 28.4–34.8)
MCV RBC AUTO: 98.5 FL (ref 82.6–102.9)
MONOCYTES NFR BLD: 1.38 K/UL (ref 0.1–1.2)
MONOCYTES NFR BLD: 8 % (ref 3–12)
MORPHOLOGY: NORMAL
NEGATIVE BASE EXCESS, VEN: 2.2 MMOL/L (ref 0–2)
NEGATIVE BASE EXCESS, VEN: 3.6 MMOL/L (ref 0–2)
NEUTROPHILS NFR BLD: 81 % (ref 36–65)
NEUTS SEG NFR BLD: 14.02 K/UL (ref 1.5–8.1)
NRBC BLD-RTO: 0.4 PER 100 WBC
O2 SAT, VEN: 30.8 % (ref 60–85)
O2 SAT, VEN: 35.1 % (ref 60–85)
PARTIAL THROMBOPLASTIN TIME: 42.1 SEC (ref 23–36.5)
PCO2, VEN: 46.6 MM HG (ref 39–55)
PCO2, VEN: 49.7 MM HG (ref 39–55)
PH VENOUS: 7.3 (ref 7.32–7.42)
PH VENOUS: 7.31 (ref 7.32–7.42)
PLATELET # BLD AUTO: 207 K/UL (ref 138–453)
PMV BLD AUTO: 11.5 FL (ref 8.1–13.5)
PO2, VEN: 26.6 MM HG (ref 30–50)
PO2, VEN: 28.2 MM HG (ref 30–50)
POTASSIUM SERPL-SCNC: 5.1 MMOL/L (ref 3.7–5.3)
POTASSIUM SERPL-SCNC: 5.6 MMOL/L (ref 3.7–5.3)
PROCALCITONIN SERPL-MCNC: 0.13 NG/ML
PROTHROMBIN TIME: 45.8 SEC (ref 11.7–14.9)
PROTHROMBIN TIME: 49.3 SEC (ref 11.7–14.9)
RBC # BLD AUTO: 4.75 M/UL (ref 4.21–5.77)
SODIUM SERPL-SCNC: 153 MMOL/L (ref 135–144)
SODIUM SERPL-SCNC: 154 MMOL/L (ref 135–144)
TROPONIN I SERPL HS-MCNC: 193 NG/L (ref 0–22)
WBC OTHER # BLD: 17.3 K/UL (ref 3.5–11.3)

## 2023-12-14 PROCEDURE — 2580000003 HC RX 258: Performed by: NURSE PRACTITIONER

## 2023-12-14 PROCEDURE — 36415 COLL VENOUS BLD VENIPUNCTURE: CPT

## 2023-12-14 PROCEDURE — 83605 ASSAY OF LACTIC ACID: CPT

## 2023-12-14 PROCEDURE — 85025 COMPLETE CBC W/AUTO DIFF WBC: CPT

## 2023-12-14 PROCEDURE — 83880 ASSAY OF NATRIURETIC PEPTIDE: CPT

## 2023-12-14 PROCEDURE — 6370000000 HC RX 637 (ALT 250 FOR IP)

## 2023-12-14 PROCEDURE — 87641 MR-STAPH DNA AMP PROBE: CPT

## 2023-12-14 PROCEDURE — 6360000002 HC RX W HCPCS: Performed by: NURSE PRACTITIONER

## 2023-12-14 PROCEDURE — 80076 HEPATIC FUNCTION PANEL: CPT

## 2023-12-14 PROCEDURE — 85520 HEPARIN ASSAY: CPT

## 2023-12-14 PROCEDURE — 84484 ASSAY OF TROPONIN QUANT: CPT

## 2023-12-14 PROCEDURE — 2500000003 HC RX 250 WO HCPCS

## 2023-12-14 PROCEDURE — 85730 THROMBOPLASTIN TIME PARTIAL: CPT

## 2023-12-14 PROCEDURE — 71045 X-RAY EXAM CHEST 1 VIEW: CPT

## 2023-12-14 PROCEDURE — 2000000000 HC ICU R&B

## 2023-12-14 PROCEDURE — 2580000003 HC RX 258

## 2023-12-14 PROCEDURE — 6360000002 HC RX W HCPCS

## 2023-12-14 PROCEDURE — 99232 SBSQ HOSP IP/OBS MODERATE 35: CPT | Performed by: INTERNAL MEDICINE

## 2023-12-14 PROCEDURE — 94640 AIRWAY INHALATION TREATMENT: CPT

## 2023-12-14 PROCEDURE — 80048 BASIC METABOLIC PNL TOTAL CA: CPT

## 2023-12-14 PROCEDURE — 93005 ELECTROCARDIOGRAM TRACING: CPT

## 2023-12-14 PROCEDURE — 80051 ELECTROLYTE PANEL: CPT

## 2023-12-14 PROCEDURE — 99291 CRITICAL CARE FIRST HOUR: CPT | Performed by: INTERNAL MEDICINE

## 2023-12-14 PROCEDURE — 82805 BLOOD GASES W/O2 SATURATION: CPT

## 2023-12-14 PROCEDURE — 84145 PROCALCITONIN (PCT): CPT

## 2023-12-14 PROCEDURE — 85610 PROTHROMBIN TIME: CPT

## 2023-12-14 PROCEDURE — 2700000000 HC OXYGEN THERAPY PER DAY

## 2023-12-14 PROCEDURE — 94761 N-INVAS EAR/PLS OXIMETRY MLT: CPT

## 2023-12-14 RX ORDER — DEXMEDETOMIDINE HYDROCHLORIDE 4 UG/ML
.1-1.5 INJECTION, SOLUTION INTRAVENOUS CONTINUOUS
Status: DISCONTINUED | OUTPATIENT
Start: 2023-12-14 | End: 2023-12-23

## 2023-12-14 RX ORDER — METOPROLOL TARTRATE 1 MG/ML
5 INJECTION, SOLUTION INTRAVENOUS EVERY 6 HOURS PRN
Status: DISCONTINUED | OUTPATIENT
Start: 2023-12-14 | End: 2024-01-08 | Stop reason: HOSPADM

## 2023-12-14 RX ORDER — HEPARIN SODIUM 1000 [USP'U]/ML
4000 INJECTION, SOLUTION INTRAVENOUS; SUBCUTANEOUS ONCE
Status: DISCONTINUED | OUTPATIENT
Start: 2023-12-14 | End: 2023-12-15

## 2023-12-14 RX ORDER — METOPROLOL TARTRATE 1 MG/ML
2.5 INJECTION, SOLUTION INTRAVENOUS ONCE
Status: COMPLETED | OUTPATIENT
Start: 2023-12-14 | End: 2023-12-14

## 2023-12-14 RX ORDER — IPRATROPIUM BROMIDE AND ALBUTEROL SULFATE 2.5; .5 MG/3ML; MG/3ML
1 SOLUTION RESPIRATORY (INHALATION)
Status: DISCONTINUED | OUTPATIENT
Start: 2023-12-14 | End: 2023-12-16

## 2023-12-14 RX ORDER — DEXTROSE MONOHYDRATE 50 MG/ML
INJECTION, SOLUTION INTRAVENOUS CONTINUOUS
Status: ACTIVE | OUTPATIENT
Start: 2023-12-14 | End: 2023-12-14

## 2023-12-14 RX ORDER — ATORVASTATIN CALCIUM 40 MG/1
20 TABLET, FILM COATED ORAL DAILY
Status: DISCONTINUED | OUTPATIENT
Start: 2023-12-14 | End: 2024-01-08 | Stop reason: HOSPADM

## 2023-12-14 RX ORDER — HEPARIN SODIUM 10000 [USP'U]/100ML
5-30 INJECTION, SOLUTION INTRAVENOUS CONTINUOUS
Status: DISCONTINUED | OUTPATIENT
Start: 2023-12-14 | End: 2023-12-14

## 2023-12-14 RX ORDER — HEPARIN SODIUM 1000 [USP'U]/ML
4000 INJECTION, SOLUTION INTRAVENOUS; SUBCUTANEOUS PRN
Status: DISCONTINUED | OUTPATIENT
Start: 2023-12-14 | End: 2023-12-15

## 2023-12-14 RX ORDER — ASPIRIN 81 MG/1
81 TABLET ORAL DAILY
Status: DISCONTINUED | OUTPATIENT
Start: 2023-12-14 | End: 2024-01-08 | Stop reason: HOSPADM

## 2023-12-14 RX ORDER — FAMOTIDINE 20 MG/1
20 TABLET, FILM COATED ORAL 2 TIMES DAILY
Status: DISCONTINUED | OUTPATIENT
Start: 2023-12-14 | End: 2023-12-23

## 2023-12-14 RX ORDER — HEPARIN SODIUM 1000 [USP'U]/ML
2000 INJECTION, SOLUTION INTRAVENOUS; SUBCUTANEOUS PRN
Status: DISCONTINUED | OUTPATIENT
Start: 2023-12-14 | End: 2023-12-15

## 2023-12-14 RX ADMIN — REMDESIVIR 100 MG: 100 INJECTION, POWDER, LYOPHILIZED, FOR SOLUTION INTRAVENOUS at 11:20

## 2023-12-14 RX ADMIN — PIPERACILLIN AND TAZOBACTAM 4500 MG: 4; .5 INJECTION, POWDER, LYOPHILIZED, FOR SOLUTION INTRAVENOUS; PARENTERAL at 22:49

## 2023-12-14 RX ADMIN — SODIUM CHLORIDE: 9 INJECTION, SOLUTION INTRAVENOUS at 21:45

## 2023-12-14 RX ADMIN — DEXTROSE MONOHYDRATE: 50 INJECTION, SOLUTION INTRAVENOUS at 11:55

## 2023-12-14 RX ADMIN — IPRATROPIUM BROMIDE AND ALBUTEROL SULFATE 1 DOSE: 2.5; .5 SOLUTION RESPIRATORY (INHALATION) at 11:25

## 2023-12-14 RX ADMIN — METOPROLOL TARTRATE 2.5 MG: 5 INJECTION INTRAVENOUS at 06:57

## 2023-12-14 RX ADMIN — DEXAMETHASONE SODIUM PHOSPHATE 6 MG: 10 INJECTION INTRAMUSCULAR; INTRAVENOUS at 10:30

## 2023-12-14 RX ADMIN — SODIUM CHLORIDE, PRESERVATIVE FREE 10 ML: 5 INJECTION INTRAVENOUS at 22:09

## 2023-12-14 RX ADMIN — METOPROLOL TARTRATE 12.5 MG: 25 TABLET ORAL at 10:30

## 2023-12-14 RX ADMIN — DEXMEDETOMIDINE HYDROCHLORIDE 0.1 MCG/KG/HR: 400 INJECTION, SOLUTION INTRAVENOUS at 22:26

## 2023-12-14 RX ADMIN — VANCOMYCIN HYDROCHLORIDE 1250 MG: 1.25 INJECTION, POWDER, LYOPHILIZED, FOR SOLUTION INTRAVENOUS at 22:12

## 2023-12-14 RX ADMIN — IPRATROPIUM BROMIDE AND ALBUTEROL SULFATE 1 DOSE: 2.5; .5 SOLUTION RESPIRATORY (INHALATION) at 15:30

## 2023-12-15 ENCOUNTER — APPOINTMENT (OUTPATIENT)
Dept: CT IMAGING | Age: 83
DRG: 208 | End: 2023-12-15
Payer: MEDICARE

## 2023-12-15 PROBLEM — I35.1 AORTIC VALVE REGURGITATION: Status: ACTIVE | Noted: 2023-12-15

## 2023-12-15 PROBLEM — Z98.890 S/P MVR (MITRAL VALVE REPAIR): Status: ACTIVE | Noted: 2023-12-15

## 2023-12-15 PROBLEM — I48.21 PERMANENT ATRIAL FIBRILLATION (HCC): Status: ACTIVE | Noted: 2023-12-15

## 2023-12-15 PROBLEM — I21.4 NSTEMI (NON-ST ELEVATED MYOCARDIAL INFARCTION) (HCC): Status: ACTIVE | Noted: 2023-12-15

## 2023-12-15 LAB
ALBUMIN SERPL-MCNC: 3.4 G/DL (ref 3.5–5.2)
ALBUMIN/GLOB SERPL: 1.3 {RATIO} (ref 1–2.5)
ALP SERPL-CCNC: 74 U/L (ref 40–129)
ALT SERPL-CCNC: 46 U/L (ref 5–41)
ANION GAP SERPL CALCULATED.3IONS-SCNC: 13 MMOL/L (ref 9–17)
ANTI-XA UNFRAC HEPARIN: <0.1 IU/L
AST SERPL-CCNC: 66 U/L
BASOPHILS # BLD: 0.04 K/UL (ref 0–0.2)
BASOPHILS NFR BLD: 0 % (ref 0–2)
BILIRUB DIRECT SERPL-MCNC: 0.7 MG/DL
BILIRUB INDIRECT SERPL-MCNC: 0.5 MG/DL (ref 0–1)
BILIRUB SERPL-MCNC: 1.2 MG/DL (ref 0.3–1.2)
BODY TEMPERATURE: 37
BUN SERPL-MCNC: 74 MG/DL (ref 8–23)
CALCIUM SERPL-MCNC: 8.3 MG/DL (ref 8.6–10.4)
CHLORIDE SERPL-SCNC: 120 MMOL/L (ref 98–107)
CO2 SERPL-SCNC: 19 MMOL/L (ref 20–31)
COHGB MFR BLD: 0.1 % (ref 0–5)
CREAT SERPL-MCNC: 1.2 MG/DL (ref 0.7–1.2)
EKG ATRIAL RATE: 63 BPM
EKG Q-T INTERVAL: 540 MS
EKG QRS DURATION: 154 MS
EKG QTC CALCULATION (BAZETT): 578 MS
EKG R AXIS: -80 DEGREES
EKG T AXIS: -175 DEGREES
EKG VENTRICULAR RATE: 69 BPM
EOSINOPHIL # BLD: <0.03 K/UL (ref 0–0.44)
EOSINOPHILS RELATIVE PERCENT: 0 % (ref 1–4)
ERYTHROCYTE [DISTWIDTH] IN BLOOD BY AUTOMATED COUNT: 13.6 % (ref 11.8–14.4)
FIO2 ON VENT: ABNORMAL %
GFR SERPL CREATININE-BSD FRML MDRD: >60 ML/MIN/1.73M2
GLUCOSE SERPL-MCNC: 168 MG/DL (ref 70–99)
HCO3 VENOUS: 21.2 MMOL/L (ref 24–30)
HCT VFR BLD AUTO: 39.1 % (ref 40.7–50.3)
HGB BLD-MCNC: 12.5 G/DL (ref 13–17)
IMM GRANULOCYTES # BLD AUTO: 0.34 K/UL (ref 0–0.3)
IMM GRANULOCYTES NFR BLD: 3 %
INR PPP: 5.7
LACTIC ACID, WHOLE BLOOD: 2 MMOL/L (ref 0.7–2.1)
LACTIC ACID, WHOLE BLOOD: 2.2 MMOL/L (ref 0.7–2.1)
LACTIC ACID, WHOLE BLOOD: 4.2 MMOL/L (ref 0.7–2.1)
LACTIC ACID, WHOLE BLOOD: 5 MMOL/L (ref 0.7–2.1)
LYMPHOCYTES NFR BLD: 0.89 K/UL (ref 1.1–3.7)
LYMPHOCYTES RELATIVE PERCENT: 7 % (ref 24–43)
MCH RBC QN AUTO: 30.4 PG (ref 25.2–33.5)
MCHC RBC AUTO-ENTMCNC: 32 G/DL (ref 28.4–34.8)
MCV RBC AUTO: 95.1 FL (ref 82.6–102.9)
MICROORGANISM SPEC CULT: NORMAL
MICROORGANISM SPEC CULT: NORMAL
MONOCYTES NFR BLD: 0.65 K/UL (ref 0.1–1.2)
MONOCYTES NFR BLD: 5 % (ref 3–12)
MRSA, DNA, NASAL: NEGATIVE
NEGATIVE BASE EXCESS, VEN: 2.5 MMOL/L (ref 0–2)
NEUTROPHILS NFR BLD: 84 % (ref 36–65)
NEUTS SEG NFR BLD: 10.15 K/UL (ref 1.5–8.1)
NRBC BLD-RTO: 1.2 PER 100 WBC
O2 SAT, VEN: 96.1 % (ref 60–85)
PCO2, VEN: 35 MM HG (ref 39–55)
PH VENOUS: 7.4 (ref 7.32–7.42)
PLATELET # BLD AUTO: 160 K/UL (ref 138–453)
PMV BLD AUTO: 11.6 FL (ref 8.1–13.5)
PO2, VEN: 90.3 MM HG (ref 30–50)
POTASSIUM SERPL-SCNC: 4.6 MMOL/L (ref 3.7–5.3)
PROT SERPL-MCNC: 6 G/DL (ref 6.4–8.3)
PROTHROMBIN TIME: 50.5 SEC (ref 11.7–14.9)
RBC # BLD AUTO: 4.11 M/UL (ref 4.21–5.77)
SERVICE CMNT-IMP: NORMAL
SERVICE CMNT-IMP: NORMAL
SODIUM SERPL-SCNC: 152 MMOL/L (ref 135–144)
SPECIMEN DESCRIPTION: NORMAL
TROPONIN I SERPL HS-MCNC: 110 NG/L (ref 0–22)
TROPONIN I SERPL HS-MCNC: 129 NG/L (ref 0–22)
TROPONIN I SERPL HS-MCNC: 140 NG/L (ref 0–22)
TROPONIN I SERPL HS-MCNC: 155 NG/L (ref 0–22)
WBC OTHER # BLD: 12.1 K/UL (ref 3.5–11.3)

## 2023-12-15 PROCEDURE — 6360000002 HC RX W HCPCS

## 2023-12-15 PROCEDURE — 2580000003 HC RX 258

## 2023-12-15 PROCEDURE — 36415 COLL VENOUS BLD VENIPUNCTURE: CPT

## 2023-12-15 PROCEDURE — 6370000000 HC RX 637 (ALT 250 FOR IP)

## 2023-12-15 PROCEDURE — 94640 AIRWAY INHALATION TREATMENT: CPT

## 2023-12-15 PROCEDURE — 2700000000 HC OXYGEN THERAPY PER DAY

## 2023-12-15 PROCEDURE — 99291 CRITICAL CARE FIRST HOUR: CPT | Performed by: INTERNAL MEDICINE

## 2023-12-15 PROCEDURE — 2580000003 HC RX 258: Performed by: INTERNAL MEDICINE

## 2023-12-15 PROCEDURE — 85610 PROTHROMBIN TIME: CPT

## 2023-12-15 PROCEDURE — 83605 ASSAY OF LACTIC ACID: CPT

## 2023-12-15 PROCEDURE — 84484 ASSAY OF TROPONIN QUANT: CPT

## 2023-12-15 PROCEDURE — 94761 N-INVAS EAR/PLS OXIMETRY MLT: CPT

## 2023-12-15 PROCEDURE — 2580000003 HC RX 258: Performed by: STUDENT IN AN ORGANIZED HEALTH CARE EDUCATION/TRAINING PROGRAM

## 2023-12-15 PROCEDURE — 2500000003 HC RX 250 WO HCPCS

## 2023-12-15 PROCEDURE — 93010 ELECTROCARDIOGRAM REPORT: CPT | Performed by: INTERNAL MEDICINE

## 2023-12-15 PROCEDURE — 80048 BASIC METABOLIC PNL TOTAL CA: CPT

## 2023-12-15 PROCEDURE — 82805 BLOOD GASES W/O2 SATURATION: CPT

## 2023-12-15 PROCEDURE — 99233 SBSQ HOSP IP/OBS HIGH 50: CPT | Performed by: INTERNAL MEDICINE

## 2023-12-15 PROCEDURE — 70450 CT HEAD/BRAIN W/O DYE: CPT

## 2023-12-15 PROCEDURE — 2000000000 HC ICU R&B

## 2023-12-15 PROCEDURE — 85520 HEPARIN ASSAY: CPT

## 2023-12-15 PROCEDURE — 85025 COMPLETE CBC W/AUTO DIFF WBC: CPT

## 2023-12-15 PROCEDURE — 6360000002 HC RX W HCPCS: Performed by: INTERNAL MEDICINE

## 2023-12-15 RX ORDER — SODIUM CHLORIDE 9 MG/ML
INJECTION, SOLUTION INTRAVENOUS CONTINUOUS
Status: DISCONTINUED | OUTPATIENT
Start: 2023-12-15 | End: 2023-12-15

## 2023-12-15 RX ORDER — DEXTROSE AND SODIUM CHLORIDE 5; .9 G/100ML; G/100ML
INJECTION, SOLUTION INTRAVENOUS CONTINUOUS
Status: DISCONTINUED | OUTPATIENT
Start: 2023-12-15 | End: 2023-12-15

## 2023-12-15 RX ORDER — DEXTROSE MONOHYDRATE 50 MG/ML
INJECTION, SOLUTION INTRAVENOUS CONTINUOUS
Status: DISCONTINUED | OUTPATIENT
Start: 2023-12-15 | End: 2023-12-17

## 2023-12-15 RX ADMIN — PHYTONADIONE 10 MG: 10 INJECTION, EMULSION INTRAMUSCULAR; INTRAVENOUS; SUBCUTANEOUS at 12:42

## 2023-12-15 RX ADMIN — CEFOXITIN SODIUM 2000 MG: 2 POWDER, FOR SOLUTION INTRAVENOUS at 16:53

## 2023-12-15 RX ADMIN — SODIUM CHLORIDE: 9 INJECTION, SOLUTION INTRAVENOUS at 08:50

## 2023-12-15 RX ADMIN — CEFOXITIN SODIUM 2000 MG: 2 POWDER, FOR SOLUTION INTRAVENOUS at 23:18

## 2023-12-15 RX ADMIN — IPRATROPIUM BROMIDE AND ALBUTEROL SULFATE 1 DOSE: 2.5; .5 SOLUTION RESPIRATORY (INHALATION) at 12:26

## 2023-12-15 RX ADMIN — IPRATROPIUM BROMIDE AND ALBUTEROL SULFATE 1 DOSE: 2.5; .5 SOLUTION RESPIRATORY (INHALATION) at 09:22

## 2023-12-15 RX ADMIN — PIPERACILLIN AND TAZOBACTAM 3375 MG: 3; .375 INJECTION, POWDER, FOR SOLUTION INTRAVENOUS at 05:22

## 2023-12-15 RX ADMIN — SODIUM CHLORIDE: 9 INJECTION, SOLUTION INTRAVENOUS at 14:11

## 2023-12-15 RX ADMIN — SODIUM CHLORIDE: 9 INJECTION, SOLUTION INTRAVENOUS at 12:41

## 2023-12-15 RX ADMIN — IPRATROPIUM BROMIDE AND ALBUTEROL SULFATE 1 DOSE: 2.5; .5 SOLUTION RESPIRATORY (INHALATION) at 16:20

## 2023-12-15 RX ADMIN — DEXTROSE MONOHYDRATE: 50 INJECTION, SOLUTION INTRAVENOUS at 10:40

## 2023-12-15 RX ADMIN — DEXAMETHASONE SODIUM PHOSPHATE 6 MG: 10 INJECTION INTRAMUSCULAR; INTRAVENOUS at 08:54

## 2023-12-15 RX ADMIN — SODIUM CHLORIDE 500 ML: 4.5 INJECTION, SOLUTION INTRAVENOUS at 14:03

## 2023-12-15 RX ADMIN — DEXMEDETOMIDINE HYDROCHLORIDE 1.1 MCG/KG/HR: 400 INJECTION, SOLUTION INTRAVENOUS at 15:08

## 2023-12-15 RX ADMIN — DEXTROSE MONOHYDRATE: 50 INJECTION, SOLUTION INTRAVENOUS at 23:17

## 2023-12-15 RX ADMIN — DEXMEDETOMIDINE HYDROCHLORIDE 0.5 MCG/KG/HR: 400 INJECTION, SOLUTION INTRAVENOUS at 08:59

## 2023-12-15 RX ADMIN — SODIUM CHLORIDE, PRESERVATIVE FREE 10 ML: 5 INJECTION INTRAVENOUS at 22:19

## 2023-12-15 RX ADMIN — PIPERACILLIN AND TAZOBACTAM 3375 MG: 3; .375 INJECTION, POWDER, FOR SOLUTION INTRAVENOUS at 14:12

## 2023-12-15 RX ADMIN — DEXMEDETOMIDINE HYDROCHLORIDE 1.1 MCG/KG/HR: 400 INJECTION, SOLUTION INTRAVENOUS at 19:48

## 2023-12-15 RX ADMIN — IPRATROPIUM BROMIDE AND ALBUTEROL SULFATE 1 DOSE: 2.5; .5 SOLUTION RESPIRATORY (INHALATION) at 20:53

## 2023-12-15 RX ADMIN — SODIUM CHLORIDE, PRESERVATIVE FREE 10 ML: 5 INJECTION INTRAVENOUS at 08:06

## 2023-12-16 ENCOUNTER — APPOINTMENT (OUTPATIENT)
Dept: CT IMAGING | Age: 83
DRG: 208 | End: 2023-12-16
Payer: MEDICARE

## 2023-12-16 ENCOUNTER — APPOINTMENT (OUTPATIENT)
Dept: GENERAL RADIOLOGY | Age: 83
DRG: 208 | End: 2023-12-16
Payer: MEDICARE

## 2023-12-16 PROBLEM — J69.0 ASPIRATION PNEUMONIA OF RIGHT MIDDLE LOBE (HCC): Status: ACTIVE | Noted: 2023-12-16

## 2023-12-16 PROBLEM — D72.825 BANDEMIA: Status: ACTIVE | Noted: 2023-12-16

## 2023-12-16 PROBLEM — J18.9 MULTIFOCAL PNEUMONIA: Status: ACTIVE | Noted: 2023-12-16

## 2023-12-16 LAB
ALLEN TEST: POSITIVE
ALLEN TEST: POSITIVE
AMMONIA PLAS-SCNC: 54 UMOL/L (ref 16–60)
ANION GAP SERPL CALCULATED.3IONS-SCNC: 9 MMOL/L (ref 9–17)
ANTI-XA UNFRAC HEPARIN: 0.55 IU/L
ANTI-XA UNFRAC HEPARIN: <0.1 IU/L
ANTI-XA UNFRAC HEPARIN: <0.1 IU/L
BASOPHILS # BLD: 0.04 K/UL (ref 0–0.2)
BASOPHILS NFR BLD: 0 % (ref 0–2)
BUN BLD-MCNC: 43 MG/DL (ref 8–26)
BUN SERPL-MCNC: 47 MG/DL (ref 8–23)
CA-I BLD-SCNC: 1.06 MMOL/L (ref 1.13–1.33)
CA-I BLD-SCNC: 1.15 MMOL/L (ref 1.15–1.33)
CALCIUM SERPL-MCNC: 7.8 MG/DL (ref 8.6–10.4)
CHLORIDE BLD-SCNC: 118 MMOL/L (ref 98–107)
CHLORIDE SERPL-SCNC: 117 MMOL/L (ref 98–107)
CO2 BLD CALC-SCNC: 23 MMOL/L (ref 22–30)
CO2 SERPL-SCNC: 23 MMOL/L (ref 20–31)
CREAT SERPL-MCNC: 1 MG/DL (ref 0.7–1.2)
EGFR, POC: >60 ML/MIN/1.73M2
EOSINOPHIL # BLD: <0.03 K/UL (ref 0–0.44)
EOSINOPHILS RELATIVE PERCENT: 0 % (ref 1–4)
ERYTHROCYTE [DISTWIDTH] IN BLOOD BY AUTOMATED COUNT: 13.4 % (ref 11.8–14.4)
ERYTHROCYTE [DISTWIDTH] IN BLOOD BY AUTOMATED COUNT: 13.6 % (ref 11.8–14.4)
FIO2: 1
FIO2: 32
FOLATE SERPL-MCNC: 9 NG/ML
GFR SERPL CREATININE-BSD FRML MDRD: >60 ML/MIN/1.73M2
GLUCOSE BLD-MCNC: 178 MG/DL (ref 74–100)
GLUCOSE SERPL-MCNC: 195 MG/DL (ref 70–99)
HCT VFR BLD AUTO: 38 % (ref 41–53)
HCT VFR BLD AUTO: 43 % (ref 40.7–50.3)
HCT VFR BLD AUTO: 43.1 % (ref 40.7–50.3)
HGB BLD-MCNC: 13.6 G/DL (ref 13–17)
HGB BLD-MCNC: 13.7 G/DL (ref 13–17)
IMM GRANULOCYTES # BLD AUTO: 0.26 K/UL (ref 0–0.3)
IMM GRANULOCYTES NFR BLD: 2 %
INR PPP: 1.6
LACTIC ACID, WHOLE BLOOD: 2.4 MMOL/L (ref 0.7–2.1)
LACTIC ACID, WHOLE BLOOD: 2.5 MMOL/L (ref 0.7–2.1)
LACTIC ACID, WHOLE BLOOD: 2.6 MMOL/L (ref 0.7–2.1)
LACTIC ACID, WHOLE BLOOD: 2.9 MMOL/L (ref 0.7–2.1)
LYMPHOCYTES NFR BLD: 0.92 K/UL (ref 1.1–3.7)
LYMPHOCYTES RELATIVE PERCENT: 5 % (ref 24–43)
MCH RBC QN AUTO: 30.8 PG (ref 25.2–33.5)
MCH RBC QN AUTO: 30.8 PG (ref 25.2–33.5)
MCHC RBC AUTO-ENTMCNC: 31.6 G/DL (ref 28.4–34.8)
MCHC RBC AUTO-ENTMCNC: 31.8 G/DL (ref 28.4–34.8)
MCV RBC AUTO: 96.9 FL (ref 82.6–102.9)
MCV RBC AUTO: 97.3 FL (ref 82.6–102.9)
MONOCYTES NFR BLD: 0.76 K/UL (ref 0.1–1.2)
MONOCYTES NFR BLD: 4 % (ref 3–12)
NEGATIVE BASE EXCESS, ART: 0.2 MMOL/L (ref 0–2)
NEUTROPHILS NFR BLD: 89 % (ref 36–65)
NEUTS SEG NFR BLD: 15.69 K/UL (ref 1.5–8.1)
NRBC BLD-RTO: 0.8 PER 100 WBC
NRBC BLD-RTO: 1.1 PER 100 WBC
O2 DELIVERY DEVICE: ABNORMAL
O2 DELIVERY DEVICE: ABNORMAL
PARTIAL THROMBOPLASTIN TIME: 98.7 SEC (ref 23–36.5)
PLATELET # BLD AUTO: 151 K/UL (ref 138–453)
PLATELET # BLD AUTO: 161 K/UL (ref 138–453)
PMV BLD AUTO: 11.1 FL (ref 8.1–13.5)
PMV BLD AUTO: 11.4 FL (ref 8.1–13.5)
POC ANION GAP: 12 MMOL/L (ref 7–16)
POC CREATININE: 1 MG/DL (ref 0.51–1.19)
POC HCO3: 22.6 MMOL/L (ref 21–28)
POC HCO3: 23.3 MMOL/L (ref 21–28)
POC HEMOGLOBIN (CALC): 12.9 G/DL (ref 13.5–17.5)
POC LACTIC ACID: 1.2 MMOL/L (ref 0.56–1.39)
POC O2 SATURATION: 90.8 % (ref 94–98)
POC O2 SATURATION: 97.6 % (ref 94–98)
POC PCO2: 24.8 MM HG (ref 35–48)
POC PCO2: 33.8 MM HG (ref 35–48)
POC PH: 7.45 (ref 7.35–7.45)
POC PH: 7.57 (ref 7.35–7.45)
POC PO2: 57.2 MM HG (ref 83–108)
POC PO2: 80.2 MM HG (ref 83–108)
POSITIVE BASE EXCESS, ART: 1.7 MMOL/L (ref 0–3)
POTASSIUM BLD-SCNC: 4.7 MMOL/L (ref 3.5–4.5)
POTASSIUM SERPL-SCNC: 4.9 MMOL/L (ref 3.7–5.3)
PROTHROMBIN TIME: 19.1 SEC (ref 11.7–14.9)
RBC # BLD AUTO: 4.42 M/UL (ref 4.21–5.77)
RBC # BLD AUTO: 4.45 M/UL (ref 4.21–5.77)
SAMPLE SITE: ABNORMAL
SAMPLE SITE: ABNORMAL
SODIUM BLD-SCNC: 152 MMOL/L (ref 138–146)
SODIUM SERPL-SCNC: 149 MMOL/L (ref 135–144)
T4 FREE SERPL-MCNC: 0.9 NG/DL (ref 0.9–1.7)
TROPONIN I SERPL HS-MCNC: 109 NG/L (ref 0–22)
TROPONIN I SERPL HS-MCNC: 161 NG/L (ref 0–22)
TSH SERPL DL<=0.05 MIU/L-ACNC: 0.08 UIU/ML (ref 0.3–5)
VIT B12 SERPL-MCNC: 745 PG/ML (ref 232–1245)
WBC OTHER # BLD: 17.7 K/UL (ref 3.5–11.3)
WBC OTHER # BLD: 18.4 K/UL (ref 3.5–11.3)

## 2023-12-16 PROCEDURE — 36600 WITHDRAWAL OF ARTERIAL BLOOD: CPT

## 2023-12-16 PROCEDURE — 84520 ASSAY OF UREA NITROGEN: CPT

## 2023-12-16 PROCEDURE — 82140 ASSAY OF AMMONIA: CPT

## 2023-12-16 PROCEDURE — 6360000002 HC RX W HCPCS: Performed by: STUDENT IN AN ORGANIZED HEALTH CARE EDUCATION/TRAINING PROGRAM

## 2023-12-16 PROCEDURE — 2580000003 HC RX 258

## 2023-12-16 PROCEDURE — 70450 CT HEAD/BRAIN W/O DYE: CPT

## 2023-12-16 PROCEDURE — 0DH67UZ INSERTION OF FEEDING DEVICE INTO STOMACH, VIA NATURAL OR ARTIFICIAL OPENING: ICD-10-PCS | Performed by: INTERNAL MEDICINE

## 2023-12-16 PROCEDURE — 84484 ASSAY OF TROPONIN QUANT: CPT

## 2023-12-16 PROCEDURE — 85027 COMPLETE CBC AUTOMATED: CPT

## 2023-12-16 PROCEDURE — 99291 CRITICAL CARE FIRST HOUR: CPT | Performed by: INTERNAL MEDICINE

## 2023-12-16 PROCEDURE — 80048 BASIC METABOLIC PNL TOTAL CA: CPT

## 2023-12-16 PROCEDURE — 6360000002 HC RX W HCPCS

## 2023-12-16 PROCEDURE — 85025 COMPLETE CBC W/AUTO DIFF WBC: CPT

## 2023-12-16 PROCEDURE — 2580000003 HC RX 258: Performed by: INTERNAL MEDICINE

## 2023-12-16 PROCEDURE — 82330 ASSAY OF CALCIUM: CPT

## 2023-12-16 PROCEDURE — 82803 BLOOD GASES ANY COMBINATION: CPT

## 2023-12-16 PROCEDURE — 82947 ASSAY GLUCOSE BLOOD QUANT: CPT

## 2023-12-16 PROCEDURE — 82565 ASSAY OF CREATININE: CPT

## 2023-12-16 PROCEDURE — 36415 COLL VENOUS BLD VENIPUNCTURE: CPT

## 2023-12-16 PROCEDURE — 2000000000 HC ICU R&B

## 2023-12-16 PROCEDURE — 84443 ASSAY THYROID STIM HORMONE: CPT

## 2023-12-16 PROCEDURE — 94640 AIRWAY INHALATION TREATMENT: CPT

## 2023-12-16 PROCEDURE — 84425 ASSAY OF VITAMIN B-1: CPT

## 2023-12-16 PROCEDURE — 85520 HEPARIN ASSAY: CPT

## 2023-12-16 PROCEDURE — 82746 ASSAY OF FOLIC ACID SERUM: CPT

## 2023-12-16 PROCEDURE — 6370000000 HC RX 637 (ALT 250 FOR IP)

## 2023-12-16 PROCEDURE — 2700000000 HC OXYGEN THERAPY PER DAY

## 2023-12-16 PROCEDURE — 84439 ASSAY OF FREE THYROXINE: CPT

## 2023-12-16 PROCEDURE — 99232 SBSQ HOSP IP/OBS MODERATE 35: CPT | Performed by: INTERNAL MEDICINE

## 2023-12-16 PROCEDURE — 85610 PROTHROMBIN TIME: CPT

## 2023-12-16 PROCEDURE — 85014 HEMATOCRIT: CPT

## 2023-12-16 PROCEDURE — 80051 ELECTROLYTE PANEL: CPT

## 2023-12-16 PROCEDURE — 6360000002 HC RX W HCPCS: Performed by: INTERNAL MEDICINE

## 2023-12-16 PROCEDURE — 99222 1ST HOSP IP/OBS MODERATE 55: CPT | Performed by: PSYCHIATRY & NEUROLOGY

## 2023-12-16 PROCEDURE — 2500000003 HC RX 250 WO HCPCS

## 2023-12-16 PROCEDURE — 94761 N-INVAS EAR/PLS OXIMETRY MLT: CPT

## 2023-12-16 PROCEDURE — 83605 ASSAY OF LACTIC ACID: CPT

## 2023-12-16 PROCEDURE — 3E0G76Z INTRODUCTION OF NUTRITIONAL SUBSTANCE INTO UPPER GI, VIA NATURAL OR ARTIFICIAL OPENING: ICD-10-PCS | Performed by: INTERNAL MEDICINE

## 2023-12-16 PROCEDURE — 82607 VITAMIN B-12: CPT

## 2023-12-16 PROCEDURE — 85730 THROMBOPLASTIN TIME PARTIAL: CPT

## 2023-12-16 PROCEDURE — 2580000003 HC RX 258: Performed by: EMERGENCY MEDICINE

## 2023-12-16 PROCEDURE — 74018 RADEX ABDOMEN 1 VIEW: CPT

## 2023-12-16 RX ORDER — HEPARIN SODIUM 1000 [USP'U]/ML
60 INJECTION, SOLUTION INTRAVENOUS; SUBCUTANEOUS PRN
Status: DISCONTINUED | OUTPATIENT
Start: 2023-12-16 | End: 2023-12-22

## 2023-12-16 RX ORDER — CALCIUM GLUCONATE 20 MG/ML
2000 INJECTION, SOLUTION INTRAVENOUS ONCE
Status: COMPLETED | OUTPATIENT
Start: 2023-12-16 | End: 2023-12-16

## 2023-12-16 RX ORDER — MIDAZOLAM HYDROCHLORIDE 2 MG/2ML
1 INJECTION, SOLUTION INTRAMUSCULAR; INTRAVENOUS
Status: DISCONTINUED | OUTPATIENT
Start: 2023-12-16 | End: 2023-12-16

## 2023-12-16 RX ORDER — HEPARIN SODIUM 1000 [USP'U]/ML
60 INJECTION, SOLUTION INTRAVENOUS; SUBCUTANEOUS ONCE
Status: COMPLETED | OUTPATIENT
Start: 2023-12-16 | End: 2023-12-16

## 2023-12-16 RX ORDER — HEPARIN SODIUM 1000 [USP'U]/ML
30 INJECTION, SOLUTION INTRAVENOUS; SUBCUTANEOUS PRN
Status: DISCONTINUED | OUTPATIENT
Start: 2023-12-16 | End: 2023-12-22

## 2023-12-16 RX ORDER — IPRATROPIUM BROMIDE AND ALBUTEROL SULFATE 2.5; .5 MG/3ML; MG/3ML
1 SOLUTION RESPIRATORY (INHALATION) 2 TIMES DAILY
Status: DISCONTINUED | OUTPATIENT
Start: 2023-12-16 | End: 2023-12-17

## 2023-12-16 RX ORDER — ALBUTEROL SULFATE 2.5 MG/3ML
2.5 SOLUTION RESPIRATORY (INHALATION) EVERY 4 HOURS PRN
Status: DISCONTINUED | OUTPATIENT
Start: 2023-12-16 | End: 2024-01-08 | Stop reason: HOSPADM

## 2023-12-16 RX ORDER — HEPARIN SODIUM 10000 [USP'U]/100ML
5-30 INJECTION, SOLUTION INTRAVENOUS CONTINUOUS
Status: DISCONTINUED | OUTPATIENT
Start: 2023-12-16 | End: 2023-12-22

## 2023-12-16 RX ORDER — FENTANYL CITRATE 50 UG/ML
25 INJECTION, SOLUTION INTRAMUSCULAR; INTRAVENOUS
Status: DISPENSED | OUTPATIENT
Start: 2023-12-16 | End: 2023-12-17

## 2023-12-16 RX ADMIN — DEXMEDETOMIDINE HYDROCHLORIDE 1.4 MCG/KG/HR: 400 INJECTION, SOLUTION INTRAVENOUS at 16:58

## 2023-12-16 RX ADMIN — FAMOTIDINE 20 MG: 20 TABLET, FILM COATED ORAL at 23:05

## 2023-12-16 RX ADMIN — DEXMEDETOMIDINE HYDROCHLORIDE 0.8 MCG/KG/HR: 400 INJECTION, SOLUTION INTRAVENOUS at 10:51

## 2023-12-16 RX ADMIN — IPRATROPIUM BROMIDE AND ALBUTEROL SULFATE 1 DOSE: 2.5; .5 SOLUTION RESPIRATORY (INHALATION) at 21:41

## 2023-12-16 RX ADMIN — CEFOXITIN SODIUM 2000 MG: 2 POWDER, FOR SOLUTION INTRAVENOUS at 23:08

## 2023-12-16 RX ADMIN — HEPARIN SODIUM 12 UNITS/KG/HR: 10000 INJECTION, SOLUTION INTRAVENOUS at 12:38

## 2023-12-16 RX ADMIN — CEFOXITIN SODIUM 2000 MG: 2 POWDER, FOR SOLUTION INTRAVENOUS at 15:52

## 2023-12-16 RX ADMIN — SODIUM CHLORIDE, PRESERVATIVE FREE 10 ML: 5 INJECTION INTRAVENOUS at 21:00

## 2023-12-16 RX ADMIN — SODIUM CHLORIDE: 9 INJECTION, SOLUTION INTRAVENOUS at 05:01

## 2023-12-16 RX ADMIN — IPRATROPIUM BROMIDE AND ALBUTEROL SULFATE 1 DOSE: 2.5; .5 SOLUTION RESPIRATORY (INHALATION) at 08:30

## 2023-12-16 RX ADMIN — DEXAMETHASONE SODIUM PHOSPHATE 6 MG: 10 INJECTION INTRAMUSCULAR; INTRAVENOUS at 10:13

## 2023-12-16 RX ADMIN — DEXMEDETOMIDINE HYDROCHLORIDE 1 MCG/KG/HR: 400 INJECTION, SOLUTION INTRAVENOUS at 01:01

## 2023-12-16 RX ADMIN — ZIPRASIDONE MESYLATE 20 MG: 20 INJECTION, POWDER, LYOPHILIZED, FOR SOLUTION INTRAMUSCULAR at 03:52

## 2023-12-16 RX ADMIN — ZIPRASIDONE MESYLATE 20 MG: 20 INJECTION, POWDER, LYOPHILIZED, FOR SOLUTION INTRAMUSCULAR at 16:59

## 2023-12-16 RX ADMIN — SODIUM CHLORIDE, PRESERVATIVE FREE 10 ML: 5 INJECTION INTRAVENOUS at 08:10

## 2023-12-16 RX ADMIN — CALCIUM GLUCONATE 2000 MG: 20 INJECTION, SOLUTION INTRAVENOUS at 12:31

## 2023-12-16 RX ADMIN — HEPARIN SODIUM 3820 UNITS: 1000 INJECTION INTRAVENOUS; SUBCUTANEOUS at 12:39

## 2023-12-16 RX ADMIN — DEXMEDETOMIDINE HYDROCHLORIDE 1.4 MCG/KG/HR: 400 INJECTION, SOLUTION INTRAVENOUS at 20:56

## 2023-12-16 RX ADMIN — DEXMEDETOMIDINE HYDROCHLORIDE 1.2 MCG/KG/HR: 400 INJECTION, SOLUTION INTRAVENOUS at 05:02

## 2023-12-16 RX ADMIN — CEFOXITIN SODIUM 2000 MG: 2 POWDER, FOR SOLUTION INTRAVENOUS at 06:47

## 2023-12-16 RX ADMIN — DEXTROSE MONOHYDRATE: 50 INJECTION, SOLUTION INTRAVENOUS at 14:26

## 2023-12-16 NOTE — RT PROTOCOL NOTE
RT Nebulizer Bronchodilator Protocol Note    There is a bronchodilator order in the chart from a provider indicating to follow the RT Bronchodilator Protocol and there is an “Initiate RT Bronchodilator Protocol” order as well (see protocol at bottom of note).    CXR Findings:  XR CHEST PORTABLE    Result Date: 12/14/2023  Worsening left perihilar infiltrates. Stable right infrahilar infiltrates in mid right lung pulmonary infiltrates.       The findings from the last RT Protocol Assessment were as follows:  Smoking: Chronic pulmonary disease  Respiratory Pattern: Dyspnea on exertion or RR 21-25 bpm  Breath Sounds: Clear breath sounds  Cough: Strong, spontaneous, non-productive  Indication for Bronchodilator Therapy: None  Bronchodilator Assessment Score: 4    Aerosolized bronchodilator medication orders have been revised according to the RT Nebulizer Bronchodilator Protocol below.    Respiratory Therapist to perform RT Therapy Protocol Assessment initially then follow the protocol.  Repeat RT Therapy Protocol Assessment PRN for score 0-3 or on second treatment, BID, and PRN for scores above 3.    No Indications - adjust the frequency to every 6 hours PRN wheezing or bronchospasm, if no treatments needed after 48 hours then discontinue using Per Protocol order mode.     If indication present, adjust the RT bronchodilator orders based on the Bronchodilator Assessment Score as indicated below.  If a patient is on this medication at home then do not decrease Frequency below that used at home.    0-3 - enter or revise RT bronchodilator order(s) to equivalent RT Bronchodilator order with Frequency of every 4 hours PRN for wheezing or increased work of breathing using Per Protocol order mode.       4-6 - enter or revise RT Bronchodilator order(s) to two equivalent RT bronchodilator orders with one order with BID Frequency and one order with Frequency of every 4 hours PRN wheezing or increased work of breathing using Per

## 2023-12-17 ENCOUNTER — APPOINTMENT (OUTPATIENT)
Dept: GENERAL RADIOLOGY | Age: 83
DRG: 208 | End: 2023-12-17
Payer: MEDICARE

## 2023-12-17 PROBLEM — G93.41 METABOLIC ENCEPHALOPATHY: Status: ACTIVE | Noted: 2023-12-17

## 2023-12-17 PROBLEM — J96.01 ACUTE RESPIRATORY FAILURE WITH HYPOXIA (HCC): Status: ACTIVE | Noted: 2023-12-17

## 2023-12-17 PROBLEM — R79.1 SUPRATHERAPEUTIC INR: Status: ACTIVE | Noted: 2023-12-17

## 2023-12-17 LAB
ALLEN TEST: POSITIVE
ANION GAP SERPL CALCULATED.3IONS-SCNC: 15 MMOL/L (ref 9–17)
ANION GAP SERPL CALCULATED.3IONS-SCNC: 9 MMOL/L (ref 9–17)
ANTI-XA UNFRAC HEPARIN: 0.54 IU/L
BASOPHILS # BLD: 0.06 K/UL (ref 0–0.2)
BASOPHILS NFR BLD: 0 % (ref 0–2)
BODY TEMPERATURE: 37
BODY TEMPERATURE: 37
BUN SERPL-MCNC: 42 MG/DL (ref 8–23)
BUN SERPL-MCNC: 43 MG/DL (ref 8–23)
CA-I BLD-SCNC: 1.21 MMOL/L (ref 1.13–1.33)
CALCIUM SERPL-MCNC: 8.2 MG/DL (ref 8.6–10.4)
CALCIUM SERPL-MCNC: 8.4 MG/DL (ref 8.6–10.4)
CHLORIDE SERPL-SCNC: 117 MMOL/L (ref 98–107)
CHLORIDE SERPL-SCNC: 117 MMOL/L (ref 98–107)
CO2 SERPL-SCNC: 20 MMOL/L (ref 20–31)
CO2 SERPL-SCNC: 21 MMOL/L (ref 20–31)
COHGB MFR BLD: 0.5 % (ref 0–5)
COHGB MFR BLD: 1.2 % (ref 0–5)
CREAT SERPL-MCNC: 0.9 MG/DL (ref 0.7–1.2)
CREAT SERPL-MCNC: 0.9 MG/DL (ref 0.7–1.2)
CRP SERPL HS-MCNC: 7.3 MG/L (ref 0–5)
EOSINOPHIL # BLD: <0.03 K/UL (ref 0–0.44)
EOSINOPHILS RELATIVE PERCENT: 0 % (ref 1–4)
ERYTHROCYTE [DISTWIDTH] IN BLOOD BY AUTOMATED COUNT: 13.4 % (ref 11.8–14.4)
FIO2 ON VENT: ABNORMAL %
FIO2 ON VENT: ABNORMAL %
FIO2: 60
GFR SERPL CREATININE-BSD FRML MDRD: >60 ML/MIN/1.73M2
GFR SERPL CREATININE-BSD FRML MDRD: >60 ML/MIN/1.73M2
GLUCOSE BLD-MCNC: 219 MG/DL (ref 74–100)
GLUCOSE SERPL-MCNC: 193 MG/DL (ref 70–99)
GLUCOSE SERPL-MCNC: 237 MG/DL (ref 70–99)
HCO3 VENOUS: 23.8 MMOL/L (ref 24–30)
HCO3 VENOUS: 24.9 MMOL/L (ref 24–30)
HCT VFR BLD AUTO: 46.3 % (ref 40.7–50.3)
HGB BLD-MCNC: 13.8 G/DL (ref 13–17)
IMM GRANULOCYTES # BLD AUTO: 0.4 K/UL (ref 0–0.3)
IMM GRANULOCYTES NFR BLD: 2 %
LACTIC ACID, WHOLE BLOOD: 3.6 MMOL/L (ref 0.7–2.1)
LACTIC ACID, WHOLE BLOOD: 3.7 MMOL/L (ref 0.7–2.1)
LACTIC ACID, WHOLE BLOOD: 3.9 MMOL/L (ref 0.7–2.1)
LACTIC ACID, WHOLE BLOOD: 3.9 MMOL/L (ref 0.7–2.1)
LYMPHOCYTES NFR BLD: 0.71 K/UL (ref 1.1–3.7)
LYMPHOCYTES RELATIVE PERCENT: 4 % (ref 24–43)
MAGNESIUM SERPL-MCNC: 3.2 MG/DL (ref 1.6–2.6)
MAGNESIUM SERPL-MCNC: 3.7 MG/DL (ref 1.6–2.6)
MCH RBC QN AUTO: 30.7 PG (ref 25.2–33.5)
MCHC RBC AUTO-ENTMCNC: 29.8 G/DL (ref 28.4–34.8)
MCV RBC AUTO: 102.9 FL (ref 82.6–102.9)
MONOCYTES NFR BLD: 0.79 K/UL (ref 0.1–1.2)
MONOCYTES NFR BLD: 4 % (ref 3–12)
NEUTROPHILS NFR BLD: 89 % (ref 36–65)
NEUTS SEG NFR BLD: 16.41 K/UL (ref 1.5–8.1)
NRBC BLD-RTO: 0.9 PER 100 WBC
O2 DELIVERY DEVICE: ABNORMAL
O2 SAT, VEN: 53.7 % (ref 60–85)
O2 SAT, VEN: 82.3 % (ref 60–85)
PCO2, VEN: 36 MM HG (ref 39–55)
PCO2, VEN: 39.4 MM HG (ref 39–55)
PH VENOUS: 7.42 (ref 7.32–7.42)
PH VENOUS: 7.44 (ref 7.32–7.42)
PLATELET # BLD AUTO: 150 K/UL (ref 138–453)
PMV BLD AUTO: 11.7 FL (ref 8.1–13.5)
PO2, VEN: 31.4 MM HG (ref 30–50)
PO2, VEN: 45.9 MM HG (ref 30–50)
POC HCO3: 23.3 MMOL/L (ref 21–28)
POC O2 SATURATION: 96.2 % (ref 94–98)
POC PCO2: 32.9 MM HG (ref 35–48)
POC PH: 7.46 (ref 7.35–7.45)
POC PO2: 78.1 MM HG (ref 83–108)
POSITIVE BASE EXCESS, ART: 0 MMOL/L (ref 0–3)
POSITIVE BASE EXCESS, VEN: 0.5 MMOL/L (ref 0–2)
POSITIVE BASE EXCESS, VEN: 1 MMOL/L (ref 0–2)
POTASSIUM SERPL-SCNC: 4.9 MMOL/L (ref 3.7–5.3)
POTASSIUM SERPL-SCNC: 4.9 MMOL/L (ref 3.7–5.3)
PROCALCITONIN SERPL-MCNC: 0.07 NG/ML (ref 0–0.09)
RBC # BLD AUTO: 4.5 M/UL (ref 4.21–5.77)
SAMPLE SITE: ABNORMAL
SODIUM SERPL-SCNC: 146 MMOL/L (ref 135–144)
SODIUM SERPL-SCNC: 153 MMOL/L (ref 135–144)
TROPONIN I SERPL HS-MCNC: 110 NG/L (ref 0–22)
WBC OTHER # BLD: 18.4 K/UL (ref 3.5–11.3)

## 2023-12-17 PROCEDURE — 94640 AIRWAY INHALATION TREATMENT: CPT

## 2023-12-17 PROCEDURE — 2580000003 HC RX 258

## 2023-12-17 PROCEDURE — 71045 X-RAY EXAM CHEST 1 VIEW: CPT

## 2023-12-17 PROCEDURE — 82805 BLOOD GASES W/O2 SATURATION: CPT

## 2023-12-17 PROCEDURE — 85520 HEPARIN ASSAY: CPT

## 2023-12-17 PROCEDURE — 36600 WITHDRAWAL OF ARTERIAL BLOOD: CPT

## 2023-12-17 PROCEDURE — 6360000002 HC RX W HCPCS

## 2023-12-17 PROCEDURE — 6370000000 HC RX 637 (ALT 250 FOR IP)

## 2023-12-17 PROCEDURE — 84484 ASSAY OF TROPONIN QUANT: CPT

## 2023-12-17 PROCEDURE — 2580000003 HC RX 258: Performed by: INTERNAL MEDICINE

## 2023-12-17 PROCEDURE — 83735 ASSAY OF MAGNESIUM: CPT

## 2023-12-17 PROCEDURE — 93005 ELECTROCARDIOGRAM TRACING: CPT | Performed by: EMERGENCY MEDICINE

## 2023-12-17 PROCEDURE — 6360000002 HC RX W HCPCS: Performed by: EMERGENCY MEDICINE

## 2023-12-17 PROCEDURE — 85025 COMPLETE CBC W/AUTO DIFF WBC: CPT

## 2023-12-17 PROCEDURE — 6360000002 HC RX W HCPCS: Performed by: INTERNAL MEDICINE

## 2023-12-17 PROCEDURE — 82803 BLOOD GASES ANY COMBINATION: CPT

## 2023-12-17 PROCEDURE — 99291 CRITICAL CARE FIRST HOUR: CPT | Performed by: INTERNAL MEDICINE

## 2023-12-17 PROCEDURE — 86140 C-REACTIVE PROTEIN: CPT

## 2023-12-17 PROCEDURE — 6370000000 HC RX 637 (ALT 250 FOR IP): Performed by: EMERGENCY MEDICINE

## 2023-12-17 PROCEDURE — 94761 N-INVAS EAR/PLS OXIMETRY MLT: CPT

## 2023-12-17 PROCEDURE — 2000000000 HC ICU R&B

## 2023-12-17 PROCEDURE — 84145 PROCALCITONIN (PCT): CPT

## 2023-12-17 PROCEDURE — 36415 COLL VENOUS BLD VENIPUNCTURE: CPT

## 2023-12-17 PROCEDURE — 6360000002 HC RX W HCPCS: Performed by: STUDENT IN AN ORGANIZED HEALTH CARE EDUCATION/TRAINING PROGRAM

## 2023-12-17 PROCEDURE — 2500000003 HC RX 250 WO HCPCS

## 2023-12-17 PROCEDURE — 83605 ASSAY OF LACTIC ACID: CPT

## 2023-12-17 PROCEDURE — 82947 ASSAY GLUCOSE BLOOD QUANT: CPT

## 2023-12-17 PROCEDURE — 2700000000 HC OXYGEN THERAPY PER DAY

## 2023-12-17 PROCEDURE — 82330 ASSAY OF CALCIUM: CPT

## 2023-12-17 PROCEDURE — 99232 SBSQ HOSP IP/OBS MODERATE 35: CPT | Performed by: PSYCHIATRY & NEUROLOGY

## 2023-12-17 PROCEDURE — 80048 BASIC METABOLIC PNL TOTAL CA: CPT

## 2023-12-17 RX ORDER — ACETYLCYSTEINE 200 MG/ML
600 SOLUTION ORAL; RESPIRATORY (INHALATION)
Status: DISCONTINUED | OUTPATIENT
Start: 2023-12-17 | End: 2023-12-17

## 2023-12-17 RX ORDER — IPRATROPIUM BROMIDE AND ALBUTEROL SULFATE 2.5; .5 MG/3ML; MG/3ML
1 SOLUTION RESPIRATORY (INHALATION)
Status: DISCONTINUED | OUTPATIENT
Start: 2023-12-17 | End: 2023-12-25

## 2023-12-17 RX ORDER — MAGNESIUM SULFATE IN WATER 40 MG/ML
2000 INJECTION, SOLUTION INTRAVENOUS ONCE
Status: COMPLETED | OUTPATIENT
Start: 2023-12-17 | End: 2023-12-17

## 2023-12-17 RX ORDER — ACETYLCYSTEINE 200 MG/ML
600 SOLUTION ORAL; RESPIRATORY (INHALATION)
Status: COMPLETED | OUTPATIENT
Start: 2023-12-17 | End: 2023-12-18

## 2023-12-17 RX ORDER — OLANZAPINE 5 MG/1
2.5 TABLET ORAL 2 TIMES DAILY PRN
Status: DISCONTINUED | OUTPATIENT
Start: 2023-12-17 | End: 2023-12-17

## 2023-12-17 RX ORDER — SODIUM CHLORIDE FOR INHALATION 3 %
4 VIAL, NEBULIZER (ML) INHALATION
Status: DISCONTINUED | OUTPATIENT
Start: 2023-12-17 | End: 2023-12-17

## 2023-12-17 RX ADMIN — DEXMEDETOMIDINE HYDROCHLORIDE 1.5 MCG/KG/HR: 400 INJECTION, SOLUTION INTRAVENOUS at 04:47

## 2023-12-17 RX ADMIN — IPRATROPIUM BROMIDE AND ALBUTEROL SULFATE 1 DOSE: 2.5; .5 SOLUTION RESPIRATORY (INHALATION) at 16:06

## 2023-12-17 RX ADMIN — ATORVASTATIN CALCIUM 20 MG: 40 TABLET, FILM COATED ORAL at 08:05

## 2023-12-17 RX ADMIN — Medication 6 MG: at 21:59

## 2023-12-17 RX ADMIN — DEXAMETHASONE SODIUM PHOSPHATE 6 MG: 10 INJECTION INTRAMUSCULAR; INTRAVENOUS at 11:54

## 2023-12-17 RX ADMIN — FAMOTIDINE 20 MG: 20 TABLET, FILM COATED ORAL at 08:07

## 2023-12-17 RX ADMIN — CEFOXITIN SODIUM 2000 MG: 2 POWDER, FOR SOLUTION INTRAVENOUS at 23:36

## 2023-12-17 RX ADMIN — MAGNESIUM SULFATE HEPTAHYDRATE 2000 MG: 40 INJECTION, SOLUTION INTRAVENOUS at 16:33

## 2023-12-17 RX ADMIN — IPRATROPIUM BROMIDE AND ALBUTEROL SULFATE 1 DOSE: 2.5; .5 SOLUTION RESPIRATORY (INHALATION) at 21:25

## 2023-12-17 RX ADMIN — DEXMEDETOMIDINE HYDROCHLORIDE 1.3 MCG/KG/HR: 400 INJECTION, SOLUTION INTRAVENOUS at 08:23

## 2023-12-17 RX ADMIN — SODIUM CHLORIDE, PRESERVATIVE FREE 10 ML: 5 INJECTION INTRAVENOUS at 08:22

## 2023-12-17 RX ADMIN — HEPARIN SODIUM 12 UNITS/KG/HR: 10000 INJECTION, SOLUTION INTRAVENOUS at 14:07

## 2023-12-17 RX ADMIN — SODIUM CHLORIDE, PRESERVATIVE FREE 10 ML: 5 INJECTION INTRAVENOUS at 11:57

## 2023-12-17 RX ADMIN — DEXMEDETOMIDINE HYDROCHLORIDE 1.4 MCG/KG/HR: 400 INJECTION, SOLUTION INTRAVENOUS at 01:00

## 2023-12-17 RX ADMIN — ACETAMINOPHEN 650 MG: 325 TABLET ORAL at 08:34

## 2023-12-17 RX ADMIN — FAMOTIDINE 20 MG: 20 TABLET, FILM COATED ORAL at 21:59

## 2023-12-17 RX ADMIN — SODIUM CHLORIDE, PRESERVATIVE FREE 10 ML: 5 INJECTION INTRAVENOUS at 21:59

## 2023-12-17 RX ADMIN — ACETYLCYSTEINE 600 MG: 200 INHALANT RESPIRATORY (INHALATION) at 16:06

## 2023-12-17 RX ADMIN — DEXMEDETOMIDINE HYDROCHLORIDE 0.7 MCG/KG/HR: 400 INJECTION, SOLUTION INTRAVENOUS at 14:10

## 2023-12-17 RX ADMIN — MAGNESIUM SULFATE HEPTAHYDRATE 2000 MG: 40 INJECTION, SOLUTION INTRAVENOUS at 08:38

## 2023-12-17 RX ADMIN — SODIUM CHLORIDE: 9 INJECTION, SOLUTION INTRAVENOUS at 04:49

## 2023-12-17 RX ADMIN — CEFOXITIN SODIUM 2000 MG: 2 POWDER, FOR SOLUTION INTRAVENOUS at 06:31

## 2023-12-17 RX ADMIN — ACETYLCYSTEINE 600 MG: 200 INHALANT RESPIRATORY (INHALATION) at 21:25

## 2023-12-17 RX ADMIN — CEFOXITIN SODIUM 2000 MG: 2 POWDER, FOR SOLUTION INTRAVENOUS at 14:20

## 2023-12-17 NOTE — CARE COORDINATION
Transitional planning    Per notes patient is on 4L NC. Neurology following. Per notes for examination \"Does not follow commands, does not answer questions, no overt gaze seen, does not track, will moan intermittent. Withdraws to pain. Very ill appearing\"  EEG pending.  Patient has NG/TF. ID following-positive for covid and pneumonia. Heparin drip and precedex drip.Cardiology also following. Will likely need placement.

## 2023-12-18 ENCOUNTER — APPOINTMENT (OUTPATIENT)
Dept: GENERAL RADIOLOGY | Age: 83
DRG: 208 | End: 2023-12-18
Payer: MEDICARE

## 2023-12-18 PROBLEM — G93.41 ACUTE METABOLIC ENCEPHALOPATHY: Status: ACTIVE | Noted: 2023-12-18

## 2023-12-18 PROBLEM — R79.82 ELEVATED C-REACTIVE PROTEIN (CRP): Status: ACTIVE | Noted: 2023-12-18

## 2023-12-18 LAB
ANION GAP SERPL CALCULATED.3IONS-SCNC: 11 MMOL/L (ref 9–17)
ANTI-XA UNFRAC HEPARIN: 0.39 IU/L
BASOPHILS # BLD: 0 K/UL (ref 0–0.2)
BASOPHILS NFR BLD: 0 % (ref 0–2)
BUN SERPL-MCNC: 48 MG/DL (ref 8–23)
CALCIUM SERPL-MCNC: 8.6 MG/DL (ref 8.6–10.4)
CHLORIDE SERPL-SCNC: 118 MMOL/L (ref 98–107)
CO2 SERPL-SCNC: 23 MMOL/L (ref 20–31)
CREAT SERPL-MCNC: 0.8 MG/DL (ref 0.7–1.2)
EKG ATRIAL RATE: 326 BPM
EKG Q-T INTERVAL: 548 MS
EKG QRS DURATION: 138 MS
EKG QTC CALCULATION (BAZETT): 548 MS
EKG R AXIS: -70 DEGREES
EKG T AXIS: -151 DEGREES
EKG VENTRICULAR RATE: 60 BPM
EOSINOPHIL # BLD: 0 K/UL (ref 0–0.4)
EOSINOPHILS RELATIVE PERCENT: 0 % (ref 1–4)
ERYTHROCYTE [DISTWIDTH] IN BLOOD BY AUTOMATED COUNT: 13.6 % (ref 11.8–14.4)
ERYTHROCYTE [SEDIMENTATION RATE] IN BLOOD BY PHOTOMETRIC METHOD: 13 MM/HR (ref 0–20)
GFR SERPL CREATININE-BSD FRML MDRD: >60 ML/MIN/1.73M2
GLUCOSE SERPL-MCNC: 194 MG/DL (ref 70–99)
HCT VFR BLD AUTO: 44.7 % (ref 40.7–50.3)
HCT VFR BLD AUTO: 45.1 % (ref 40.7–50.3)
HGB BLD-MCNC: 14 G/DL (ref 13–17)
HGB BLD-MCNC: 14.5 G/DL (ref 13–17)
IMM GRANULOCYTES # BLD AUTO: 0.54 K/UL (ref 0–0.3)
IMM GRANULOCYTES NFR BLD: 2 %
LACTIC ACID, WHOLE BLOOD: 2.7 MMOL/L (ref 0.7–2.1)
LYMPHOCYTES NFR BLD: 0.54 K/UL (ref 1–4.8)
LYMPHOCYTES RELATIVE PERCENT: 2 % (ref 24–44)
MCH RBC QN AUTO: 31.3 PG (ref 25.2–33.5)
MCHC RBC AUTO-ENTMCNC: 32.4 G/DL (ref 28.4–34.8)
MCV RBC AUTO: 96.5 FL (ref 82.6–102.9)
MONOCYTES NFR BLD: 1.35 K/UL (ref 0.1–0.8)
MONOCYTES NFR BLD: 5 % (ref 1–7)
MORPHOLOGY: NORMAL
NEUTROPHILS NFR BLD: 91 % (ref 36–66)
NEUTS SEG NFR BLD: 24.47 K/UL (ref 1.8–7.7)
NRBC BLD-RTO: 0.4 PER 100 WBC
NUCLEATED RED BLOOD CELLS: 2 PER 100 WBC
PLATELET # BLD AUTO: 153 K/UL (ref 138–453)
PMV BLD AUTO: 12.1 FL (ref 8.1–13.5)
POTASSIUM SERPL-SCNC: 4.4 MMOL/L (ref 3.7–5.3)
RBC # BLD AUTO: 4.63 M/UL (ref 4.21–5.77)
SODIUM SERPL-SCNC: 152 MMOL/L (ref 135–144)
URATE SERPL-MCNC: 6.7 MG/DL (ref 3.4–7)
WBC OTHER # BLD: 26.9 K/UL (ref 3.5–11.3)

## 2023-12-18 PROCEDURE — 6360000002 HC RX W HCPCS: Performed by: STUDENT IN AN ORGANIZED HEALTH CARE EDUCATION/TRAINING PROGRAM

## 2023-12-18 PROCEDURE — 51798 US URINE CAPACITY MEASURE: CPT

## 2023-12-18 PROCEDURE — 2700000000 HC OXYGEN THERAPY PER DAY

## 2023-12-18 PROCEDURE — 85018 HEMOGLOBIN: CPT

## 2023-12-18 PROCEDURE — 36415 COLL VENOUS BLD VENIPUNCTURE: CPT

## 2023-12-18 PROCEDURE — 6360000002 HC RX W HCPCS: Performed by: INTERNAL MEDICINE

## 2023-12-18 PROCEDURE — 99233 SBSQ HOSP IP/OBS HIGH 50: CPT | Performed by: INTERNAL MEDICINE

## 2023-12-18 PROCEDURE — 73562 X-RAY EXAM OF KNEE 3: CPT

## 2023-12-18 PROCEDURE — 94640 AIRWAY INHALATION TREATMENT: CPT

## 2023-12-18 PROCEDURE — 97110 THERAPEUTIC EXERCISES: CPT

## 2023-12-18 PROCEDURE — 99232 SBSQ HOSP IP/OBS MODERATE 35: CPT | Performed by: INTERNAL MEDICINE

## 2023-12-18 PROCEDURE — 6370000000 HC RX 637 (ALT 250 FOR IP)

## 2023-12-18 PROCEDURE — 95819 EEG AWAKE AND ASLEEP: CPT

## 2023-12-18 PROCEDURE — 80048 BASIC METABOLIC PNL TOTAL CA: CPT

## 2023-12-18 PROCEDURE — 82945 GLUCOSE OTHER FLUID: CPT

## 2023-12-18 PROCEDURE — 87075 CULTR BACTERIA EXCEPT BLOOD: CPT

## 2023-12-18 PROCEDURE — 0S9D3ZX DRAINAGE OF LEFT KNEE JOINT, PERCUTANEOUS APPROACH, DIAGNOSTIC: ICD-10-PCS | Performed by: STUDENT IN AN ORGANIZED HEALTH CARE EDUCATION/TRAINING PROGRAM

## 2023-12-18 PROCEDURE — 84550 ASSAY OF BLOOD/URIC ACID: CPT

## 2023-12-18 PROCEDURE — 2580000003 HC RX 258: Performed by: INTERNAL MEDICINE

## 2023-12-18 PROCEDURE — 87206 SMEAR FLUORESCENT/ACID STAI: CPT

## 2023-12-18 PROCEDURE — 87116 MYCOBACTERIA CULTURE: CPT

## 2023-12-18 PROCEDURE — 87040 BLOOD CULTURE FOR BACTERIA: CPT

## 2023-12-18 PROCEDURE — 85014 HEMATOCRIT: CPT

## 2023-12-18 PROCEDURE — 87070 CULTURE OTHR SPECIMN AEROBIC: CPT

## 2023-12-18 PROCEDURE — 87205 SMEAR GRAM STAIN: CPT

## 2023-12-18 PROCEDURE — 85520 HEPARIN ASSAY: CPT

## 2023-12-18 PROCEDURE — 51701 INSERT BLADDER CATHETER: CPT

## 2023-12-18 PROCEDURE — 2500000003 HC RX 250 WO HCPCS

## 2023-12-18 PROCEDURE — 95816 EEG AWAKE AND DROWSY: CPT | Performed by: PSYCHIATRY & NEUROLOGY

## 2023-12-18 PROCEDURE — 97530 THERAPEUTIC ACTIVITIES: CPT

## 2023-12-18 PROCEDURE — 99291 CRITICAL CARE FIRST HOUR: CPT | Performed by: INTERNAL MEDICINE

## 2023-12-18 PROCEDURE — 93010 ELECTROCARDIOGRAM REPORT: CPT | Performed by: INTERNAL MEDICINE

## 2023-12-18 PROCEDURE — 2580000003 HC RX 258

## 2023-12-18 PROCEDURE — 85025 COMPLETE CBC W/AUTO DIFF WBC: CPT

## 2023-12-18 PROCEDURE — 89060 EXAM SYNOVIAL FLUID CRYSTALS: CPT

## 2023-12-18 PROCEDURE — 92610 EVALUATE SWALLOWING FUNCTION: CPT

## 2023-12-18 PROCEDURE — 6360000002 HC RX W HCPCS: Performed by: EMERGENCY MEDICINE

## 2023-12-18 PROCEDURE — 94761 N-INVAS EAR/PLS OXIMETRY MLT: CPT

## 2023-12-18 PROCEDURE — 6370000000 HC RX 637 (ALT 250 FOR IP): Performed by: EMERGENCY MEDICINE

## 2023-12-18 PROCEDURE — 99232 SBSQ HOSP IP/OBS MODERATE 35: CPT | Performed by: STUDENT IN AN ORGANIZED HEALTH CARE EDUCATION/TRAINING PROGRAM

## 2023-12-18 PROCEDURE — 71045 X-RAY EXAM CHEST 1 VIEW: CPT

## 2023-12-18 PROCEDURE — 87015 SPECIMEN INFECT AGNT CONCNTJ: CPT

## 2023-12-18 PROCEDURE — 0S9C3ZX DRAINAGE OF RIGHT KNEE JOINT, PERCUTANEOUS APPROACH, DIAGNOSTIC: ICD-10-PCS | Performed by: STUDENT IN AN ORGANIZED HEALTH CARE EDUCATION/TRAINING PROGRAM

## 2023-12-18 PROCEDURE — 2000000000 HC ICU R&B

## 2023-12-18 PROCEDURE — 83605 ASSAY OF LACTIC ACID: CPT

## 2023-12-18 PROCEDURE — 89051 BODY FLUID CELL COUNT: CPT

## 2023-12-18 PROCEDURE — 85652 RBC SED RATE AUTOMATED: CPT

## 2023-12-18 RX ORDER — ONDANSETRON 2 MG/ML
INJECTION INTRAMUSCULAR; INTRAVENOUS
Status: DISCONTINUED
Start: 2023-12-18 | End: 2023-12-18 | Stop reason: WASHOUT

## 2023-12-18 RX ADMIN — CEFOXITIN SODIUM 2000 MG: 2 POWDER, FOR SOLUTION INTRAVENOUS at 06:41

## 2023-12-18 RX ADMIN — CEFOXITIN SODIUM 2000 MG: 2 POWDER, FOR SOLUTION INTRAVENOUS at 15:01

## 2023-12-18 RX ADMIN — IPRATROPIUM BROMIDE AND ALBUTEROL SULFATE 1 DOSE: 2.5; .5 SOLUTION RESPIRATORY (INHALATION) at 14:49

## 2023-12-18 RX ADMIN — DEXAMETHASONE SODIUM PHOSPHATE 6 MG: 10 INJECTION INTRAMUSCULAR; INTRAVENOUS at 10:32

## 2023-12-18 RX ADMIN — ACETYLCYSTEINE 600 MG: 200 INHALANT RESPIRATORY (INHALATION) at 08:16

## 2023-12-18 RX ADMIN — IPRATROPIUM BROMIDE AND ALBUTEROL SULFATE 1 DOSE: 2.5; .5 SOLUTION RESPIRATORY (INHALATION) at 08:16

## 2023-12-18 RX ADMIN — METOPROLOL TARTRATE 5 MG: 5 INJECTION INTRAVENOUS at 13:05

## 2023-12-18 RX ADMIN — SODIUM CHLORIDE: 9 INJECTION, SOLUTION INTRAVENOUS at 06:38

## 2023-12-18 RX ADMIN — SODIUM CHLORIDE, PRESERVATIVE FREE 10 ML: 5 INJECTION INTRAVENOUS at 20:22

## 2023-12-18 RX ADMIN — ATORVASTATIN CALCIUM 20 MG: 40 TABLET, FILM COATED ORAL at 08:18

## 2023-12-18 RX ADMIN — FAMOTIDINE 20 MG: 20 TABLET, FILM COATED ORAL at 08:18

## 2023-12-18 RX ADMIN — FAMOTIDINE 20 MG: 20 TABLET, FILM COATED ORAL at 22:35

## 2023-12-18 RX ADMIN — CEFOXITIN SODIUM 2000 MG: 2 POWDER, FOR SOLUTION INTRAVENOUS at 22:39

## 2023-12-18 RX ADMIN — HEPARIN SODIUM 12 UNITS/KG/HR: 10000 INJECTION, SOLUTION INTRAVENOUS at 06:45

## 2023-12-18 RX ADMIN — IPRATROPIUM BROMIDE AND ALBUTEROL SULFATE 1 DOSE: 2.5; .5 SOLUTION RESPIRATORY (INHALATION) at 21:32

## 2023-12-18 RX ADMIN — Medication 6 MG: at 22:35

## 2023-12-18 ASSESSMENT — PAIN SCALES - GENERAL: PAINLEVEL_OUTOF10: 0

## 2023-12-19 LAB
ANION GAP SERPL CALCULATED.3IONS-SCNC: 10 MMOL/L (ref 9–17)
ANTI-XA UNFRAC HEPARIN: 0.15 IU/L
ANTI-XA UNFRAC HEPARIN: 0.28 IU/L
ANTI-XA UNFRAC HEPARIN: 0.33 IU/L
BASOPHILS # BLD: 0 K/UL (ref 0–0.2)
BASOPHILS NFR BLD: 0 % (ref 0–2)
BUN SERPL-MCNC: 52 MG/DL (ref 8–23)
CALCIUM SERPL-MCNC: 8.7 MG/DL (ref 8.6–10.4)
CHLORIDE SERPL-SCNC: 117 MMOL/L (ref 98–107)
CO2 SERPL-SCNC: 22 MMOL/L (ref 20–31)
CREAT SERPL-MCNC: 0.8 MG/DL (ref 0.7–1.2)
EKG ATRIAL RATE: 110 BPM
EKG ATRIAL RATE: 326 BPM
EKG ATRIAL RATE: 326 BPM
EKG P AXIS: 92 DEGREES
EKG Q-T INTERVAL: 410 MS
EKG Q-T INTERVAL: 450 MS
EKG Q-T INTERVAL: 480 MS
EKG QRS DURATION: 116 MS
EKG QRS DURATION: 116 MS
EKG QRS DURATION: 142 MS
EKG QTC CALCULATION (BAZETT): 499 MS
EKG QTC CALCULATION (BAZETT): 506 MS
EKG QTC CALCULATION (BAZETT): 547 MS
EKG R AXIS: -77 DEGREES
EKG R AXIS: 114 DEGREES
EKG R AXIS: 115 DEGREES
EKG T AXIS: -70 DEGREES
EKG T AXIS: -74 DEGREES
EKG T AXIS: 113 DEGREES
EKG VENTRICULAR RATE: 107 BPM
EKG VENTRICULAR RATE: 65 BPM
EKG VENTRICULAR RATE: 76 BPM
EOSINOPHIL # BLD: 0 K/UL (ref 0–0.4)
EOSINOPHILS RELATIVE PERCENT: 0 % (ref 1–4)
ERYTHROCYTE [DISTWIDTH] IN BLOOD BY AUTOMATED COUNT: 14.5 % (ref 11.8–14.4)
ERYTHROCYTE [SEDIMENTATION RATE] IN BLOOD BY PHOTOMETRIC METHOD: 14 MM/HR (ref 0–20)
GFR SERPL CREATININE-BSD FRML MDRD: >60 ML/MIN/1.73M2
GLUCOSE BLD-MCNC: 203 MG/DL (ref 75–110)
GLUCOSE BLD-MCNC: 215 MG/DL (ref 75–110)
GLUCOSE BLD-MCNC: 216 MG/DL (ref 75–110)
GLUCOSE SERPL-MCNC: 223 MG/DL (ref 70–99)
HCT VFR BLD AUTO: 45.5 % (ref 40.7–50.3)
HGB BLD-MCNC: 13.5 G/DL (ref 13–17)
IMM GRANULOCYTES # BLD AUTO: 0.22 K/UL (ref 0–0.3)
IMM GRANULOCYTES NFR BLD: 1 %
LYMPHOCYTES NFR BLD: 0.65 K/UL (ref 1–4.8)
LYMPHOCYTES RELATIVE PERCENT: 3 % (ref 24–44)
MCH RBC QN AUTO: 31 PG (ref 25.2–33.5)
MCHC RBC AUTO-ENTMCNC: 29.7 G/DL (ref 28.4–34.8)
MCV RBC AUTO: 104.6 FL (ref 82.6–102.9)
MONOCYTES NFR BLD: 0.87 K/UL (ref 0.1–0.8)
MONOCYTES NFR BLD: 4 % (ref 1–7)
MORPHOLOGY: ABNORMAL
MORPHOLOGY: ABNORMAL
NEUTROPHILS NFR BLD: 92 % (ref 36–66)
NEUTS SEG NFR BLD: 20.06 K/UL (ref 1.8–7.7)
NRBC BLD-RTO: 0.6 PER 100 WBC
NUCLEATED RED BLOOD CELLS: 1 PER 100 WBC
PLATELET # BLD AUTO: 167 K/UL (ref 138–453)
PMV BLD AUTO: 12.4 FL (ref 8.1–13.5)
POTASSIUM SERPL-SCNC: 4.8 MMOL/L (ref 3.7–5.3)
RBC # BLD AUTO: 4.35 M/UL (ref 4.21–5.77)
SODIUM SERPL-SCNC: 149 MMOL/L (ref 135–144)
WBC OTHER # BLD: 21.8 K/UL (ref 3.5–11.3)

## 2023-12-19 PROCEDURE — 2580000003 HC RX 258: Performed by: INTERNAL MEDICINE

## 2023-12-19 PROCEDURE — 97110 THERAPEUTIC EXERCISES: CPT

## 2023-12-19 PROCEDURE — 97535 SELF CARE MNGMENT TRAINING: CPT

## 2023-12-19 PROCEDURE — 85025 COMPLETE CBC W/AUTO DIFF WBC: CPT

## 2023-12-19 PROCEDURE — 2700000000 HC OXYGEN THERAPY PER DAY

## 2023-12-19 PROCEDURE — 94761 N-INVAS EAR/PLS OXIMETRY MLT: CPT

## 2023-12-19 PROCEDURE — 99232 SBSQ HOSP IP/OBS MODERATE 35: CPT | Performed by: STUDENT IN AN ORGANIZED HEALTH CARE EDUCATION/TRAINING PROGRAM

## 2023-12-19 PROCEDURE — 6370000000 HC RX 637 (ALT 250 FOR IP)

## 2023-12-19 PROCEDURE — 93010 ELECTROCARDIOGRAM REPORT: CPT | Performed by: INTERNAL MEDICINE

## 2023-12-19 PROCEDURE — 93005 ELECTROCARDIOGRAM TRACING: CPT

## 2023-12-19 PROCEDURE — 97530 THERAPEUTIC ACTIVITIES: CPT

## 2023-12-19 PROCEDURE — 99233 SBSQ HOSP IP/OBS HIGH 50: CPT | Performed by: INTERNAL MEDICINE

## 2023-12-19 PROCEDURE — 6370000000 HC RX 637 (ALT 250 FOR IP): Performed by: EMERGENCY MEDICINE

## 2023-12-19 PROCEDURE — 2500000003 HC RX 250 WO HCPCS

## 2023-12-19 PROCEDURE — 6360000002 HC RX W HCPCS: Performed by: INTERNAL MEDICINE

## 2023-12-19 PROCEDURE — 85652 RBC SED RATE AUTOMATED: CPT

## 2023-12-19 PROCEDURE — 36415 COLL VENOUS BLD VENIPUNCTURE: CPT

## 2023-12-19 PROCEDURE — 6360000002 HC RX W HCPCS: Performed by: STUDENT IN AN ORGANIZED HEALTH CARE EDUCATION/TRAINING PROGRAM

## 2023-12-19 PROCEDURE — 85520 HEPARIN ASSAY: CPT

## 2023-12-19 PROCEDURE — 2580000003 HC RX 258

## 2023-12-19 PROCEDURE — 82947 ASSAY GLUCOSE BLOOD QUANT: CPT

## 2023-12-19 PROCEDURE — 83735 ASSAY OF MAGNESIUM: CPT

## 2023-12-19 PROCEDURE — 99291 CRITICAL CARE FIRST HOUR: CPT | Performed by: INTERNAL MEDICINE

## 2023-12-19 PROCEDURE — 2000000000 HC ICU R&B

## 2023-12-19 PROCEDURE — 51798 US URINE CAPACITY MEASURE: CPT

## 2023-12-19 PROCEDURE — 80048 BASIC METABOLIC PNL TOTAL CA: CPT

## 2023-12-19 PROCEDURE — 94640 AIRWAY INHALATION TREATMENT: CPT

## 2023-12-19 RX ORDER — FENTANYL CITRATE 50 UG/ML
50 INJECTION, SOLUTION INTRAMUSCULAR; INTRAVENOUS ONCE
Status: COMPLETED | OUTPATIENT
Start: 2023-12-20 | End: 2023-12-19

## 2023-12-19 RX ORDER — INSULIN LISPRO 100 [IU]/ML
0-4 INJECTION, SOLUTION INTRAVENOUS; SUBCUTANEOUS EVERY 6 HOURS
Status: DISCONTINUED | OUTPATIENT
Start: 2023-12-19 | End: 2024-01-07

## 2023-12-19 RX ORDER — DEXTROSE MONOHYDRATE 100 MG/ML
INJECTION, SOLUTION INTRAVENOUS CONTINUOUS PRN
Status: DISCONTINUED | OUTPATIENT
Start: 2023-12-19 | End: 2024-01-08 | Stop reason: HOSPADM

## 2023-12-19 RX ADMIN — SODIUM CHLORIDE: 9 INJECTION, SOLUTION INTRAVENOUS at 23:38

## 2023-12-19 RX ADMIN — HEPARIN SODIUM 1910 UNITS: 1000 INJECTION INTRAVENOUS; SUBCUTANEOUS at 07:43

## 2023-12-19 RX ADMIN — INSULIN LISPRO 1 UNITS: 100 INJECTION, SOLUTION INTRAVENOUS; SUBCUTANEOUS at 15:25

## 2023-12-19 RX ADMIN — IPRATROPIUM BROMIDE AND ALBUTEROL SULFATE 1 DOSE: 2.5; .5 SOLUTION RESPIRATORY (INHALATION) at 13:24

## 2023-12-19 RX ADMIN — IPRATROPIUM BROMIDE AND ALBUTEROL SULFATE 1 DOSE: 2.5; .5 SOLUTION RESPIRATORY (INHALATION) at 21:36

## 2023-12-19 RX ADMIN — HEPARIN SODIUM 14 UNITS/KG/HR: 10000 INJECTION, SOLUTION INTRAVENOUS at 12:22

## 2023-12-19 RX ADMIN — IPRATROPIUM BROMIDE AND ALBUTEROL SULFATE 1 DOSE: 2.5; .5 SOLUTION RESPIRATORY (INHALATION) at 08:54

## 2023-12-19 RX ADMIN — INSULIN LISPRO 1 UNITS: 100 INJECTION, SOLUTION INTRAVENOUS; SUBCUTANEOUS at 10:53

## 2023-12-19 RX ADMIN — CEFOXITIN SODIUM 2000 MG: 2 POWDER, FOR SOLUTION INTRAVENOUS at 06:28

## 2023-12-19 RX ADMIN — DEXMEDETOMIDINE HYDROCHLORIDE 0.2 MCG/KG/HR: 400 INJECTION, SOLUTION INTRAVENOUS at 20:09

## 2023-12-19 RX ADMIN — SODIUM CHLORIDE, PRESERVATIVE FREE 10 ML: 5 INJECTION INTRAVENOUS at 20:13

## 2023-12-19 RX ADMIN — FENTANYL CITRATE 50 MCG: 50 INJECTION, SOLUTION INTRAMUSCULAR; INTRAVENOUS at 23:57

## 2023-12-19 RX ADMIN — HEPARIN SODIUM 1910 UNITS: 1000 INJECTION INTRAVENOUS; SUBCUTANEOUS at 19:18

## 2023-12-19 RX ADMIN — DEXAMETHASONE SODIUM PHOSPHATE 6 MG: 10 INJECTION INTRAMUSCULAR; INTRAVENOUS at 10:50

## 2023-12-19 RX ADMIN — CEFOXITIN SODIUM 2000 MG: 2 POWDER, FOR SOLUTION INTRAVENOUS at 23:40

## 2023-12-19 RX ADMIN — SODIUM CHLORIDE, PRESERVATIVE FREE 10 ML: 5 INJECTION INTRAVENOUS at 08:11

## 2023-12-19 RX ADMIN — FAMOTIDINE 20 MG: 20 TABLET, FILM COATED ORAL at 08:17

## 2023-12-19 RX ADMIN — CEFOXITIN SODIUM 2000 MG: 2 POWDER, FOR SOLUTION INTRAVENOUS at 15:18

## 2023-12-19 RX ADMIN — ATORVASTATIN CALCIUM 20 MG: 40 TABLET, FILM COATED ORAL at 08:17

## 2023-12-19 NOTE — DISCHARGE INSTRUCTIONS
Orthopaedic Instructions:  -Weight bearing status: Weight bearing as tolerated with the  bilateral lower extremities  -Always work on toe motion to decrease swelling.  -Ice (20 minutes on and off 1 hour) and elevate above the level of the heart to reduce swelling and throbbing pain.  -Call the office or come to Emergency Room if signs of infection appear (hot, swollen, red, draining pus, fever)  -Take medications as prescribed.  -Wean off narcotics (percocet/norco) as soon as possible. Do not take tylenol if still taking narcotics.  -Follow up with  [facility]   as needed . Call [phone] to schedule/confirm.       
1.67

## 2023-12-20 ENCOUNTER — APPOINTMENT (OUTPATIENT)
Dept: GENERAL RADIOLOGY | Age: 83
DRG: 208 | End: 2023-12-20
Payer: MEDICARE

## 2023-12-20 LAB
ANION GAP SERPL CALCULATED.3IONS-SCNC: 7 MMOL/L (ref 9–17)
ANION GAP SERPL CALCULATED.3IONS-SCNC: 8 MMOL/L (ref 9–17)
ANTI-XA UNFRAC HEPARIN: 0.31 IU/L
ANTI-XA UNFRAC HEPARIN: 0.33 IU/L
ANTI-XA UNFRAC HEPARIN: 0.5 IU/L
BASOPHILS # BLD: 0 K/UL (ref 0–0.2)
BASOPHILS NFR BLD: 0 % (ref 0–2)
BODY FLD TYPE: NORMAL
BUN SERPL-MCNC: 53 MG/DL (ref 8–23)
BUN SERPL-MCNC: 55 MG/DL (ref 8–23)
CALCIUM SERPL-MCNC: 8.3 MG/DL (ref 8.6–10.4)
CALCIUM SERPL-MCNC: 8.5 MG/DL (ref 8.6–10.4)
CHLORIDE SERPL-SCNC: 115 MMOL/L (ref 98–107)
CHLORIDE SERPL-SCNC: 116 MMOL/L (ref 98–107)
CO2 SERPL-SCNC: 24 MMOL/L (ref 20–31)
CO2 SERPL-SCNC: 26 MMOL/L (ref 20–31)
CREAT SERPL-MCNC: 0.8 MG/DL (ref 0.7–1.2)
CREAT SERPL-MCNC: 0.8 MG/DL (ref 0.7–1.2)
CRYSTALS FLD MICRO: NEGATIVE
EOSINOPHIL # BLD: 0 K/UL (ref 0–0.4)
EOSINOPHILS RELATIVE PERCENT: 0 % (ref 1–4)
ERYTHROCYTE [DISTWIDTH] IN BLOOD BY AUTOMATED COUNT: 14 % (ref 11.8–14.4)
GFR SERPL CREATININE-BSD FRML MDRD: >60 ML/MIN/1.73M2
GFR SERPL CREATININE-BSD FRML MDRD: >60 ML/MIN/1.73M2
GLUCOSE BLD-MCNC: 112 MG/DL (ref 75–110)
GLUCOSE BLD-MCNC: 128 MG/DL (ref 75–110)
GLUCOSE BLD-MCNC: 164 MG/DL (ref 75–110)
GLUCOSE BLD-MCNC: 171 MG/DL (ref 75–110)
GLUCOSE BLD-MCNC: 211 MG/DL (ref 75–110)
GLUCOSE BLD-MCNC: 225 MG/DL (ref 75–110)
GLUCOSE BLD-MCNC: 236 MG/DL (ref 75–110)
GLUCOSE FLD-MCNC: 196 MG/DL
GLUCOSE SERPL-MCNC: 124 MG/DL (ref 70–99)
GLUCOSE SERPL-MCNC: 187 MG/DL (ref 70–99)
HCT VFR BLD AUTO: 39.2 % (ref 40.7–50.3)
HGB BLD-MCNC: 12.1 G/DL (ref 13–17)
IMM GRANULOCYTES # BLD AUTO: 0 K/UL (ref 0–0.3)
IMM GRANULOCYTES NFR BLD: 0 %
LYMPHOCYTES NFR BLD: 0.58 K/UL (ref 1–4.8)
LYMPHOCYTES NFR FLD: 23 %
LYMPHOCYTES RELATIVE PERCENT: 5 % (ref 24–44)
MAGNESIUM SERPL-MCNC: 3.2 MG/DL (ref 1.6–2.6)
MAGNESIUM SERPL-MCNC: 3.3 MG/DL (ref 1.6–2.6)
MCH RBC QN AUTO: 31.2 PG (ref 25.2–33.5)
MCHC RBC AUTO-ENTMCNC: 30.9 G/DL (ref 28.4–34.8)
MCV RBC AUTO: 101 FL (ref 82.6–102.9)
MONOCYTES NFR BLD: 0.12 K/UL (ref 0.1–0.8)
MONOCYTES NFR BLD: 1 % (ref 1–7)
MORPHOLOGY: NORMAL
NEUTROPHILS NFR BLD: 94 % (ref 36–66)
NEUTROPHILS NFR FLD: 2 %
NEUTS SEG NFR BLD: 10.9 K/UL (ref 1.8–7.7)
NRBC BLD-RTO: 0.3 PER 100 WBC
PATH INTERP FLD-IMP: NORMAL
PLATELET # BLD AUTO: ABNORMAL K/UL (ref 138–453)
PLATELET, FLUORESCENCE: 111 K/UL (ref 138–453)
PLATELETS.RETICULATED NFR BLD AUTO: 11.3 % (ref 1.1–10.3)
POTASSIUM SERPL-SCNC: 5 MMOL/L (ref 3.7–5.3)
POTASSIUM SERPL-SCNC: 5.4 MMOL/L (ref 3.7–5.3)
RBC # BLD AUTO: 3.88 M/UL (ref 4.21–5.77)
RBC # FLD: <3000 CELLS/UL
SODIUM SERPL-SCNC: 147 MMOL/L (ref 135–144)
SODIUM SERPL-SCNC: 149 MMOL/L (ref 135–144)
SPECIMEN TYPE: NORMAL
SPECIMEN TYPE: NORMAL
UNIDENT CELLS NFR FLD: NORMAL %
WBC # FLD: 284 CELLS/UL
WBC OTHER # BLD: 11.6 K/UL (ref 3.5–11.3)

## 2023-12-20 PROCEDURE — 6360000002 HC RX W HCPCS: Performed by: INTERNAL MEDICINE

## 2023-12-20 PROCEDURE — 2580000003 HC RX 258: Performed by: INTERNAL MEDICINE

## 2023-12-20 PROCEDURE — 6370000000 HC RX 637 (ALT 250 FOR IP)

## 2023-12-20 PROCEDURE — 85025 COMPLETE CBC W/AUTO DIFF WBC: CPT

## 2023-12-20 PROCEDURE — 36415 COLL VENOUS BLD VENIPUNCTURE: CPT

## 2023-12-20 PROCEDURE — 2580000003 HC RX 258

## 2023-12-20 PROCEDURE — APPSS30 APP SPLIT SHARED TIME 16-30 MINUTES: Performed by: NURSE PRACTITIONER

## 2023-12-20 PROCEDURE — 74018 RADEX ABDOMEN 1 VIEW: CPT

## 2023-12-20 PROCEDURE — 99291 CRITICAL CARE FIRST HOUR: CPT | Performed by: INTERNAL MEDICINE

## 2023-12-20 PROCEDURE — 80048 BASIC METABOLIC PNL TOTAL CA: CPT

## 2023-12-20 PROCEDURE — 99233 SBSQ HOSP IP/OBS HIGH 50: CPT | Performed by: INTERNAL MEDICINE

## 2023-12-20 PROCEDURE — 6360000002 HC RX W HCPCS: Performed by: STUDENT IN AN ORGANIZED HEALTH CARE EDUCATION/TRAINING PROGRAM

## 2023-12-20 PROCEDURE — 31720 CLEARANCE OF AIRWAYS: CPT

## 2023-12-20 PROCEDURE — 2700000000 HC OXYGEN THERAPY PER DAY

## 2023-12-20 PROCEDURE — 94640 AIRWAY INHALATION TREATMENT: CPT

## 2023-12-20 PROCEDURE — 6360000002 HC RX W HCPCS

## 2023-12-20 PROCEDURE — 85055 RETICULATED PLATELET ASSAY: CPT

## 2023-12-20 PROCEDURE — 83735 ASSAY OF MAGNESIUM: CPT

## 2023-12-20 PROCEDURE — 51702 INSERT TEMP BLADDER CATH: CPT

## 2023-12-20 PROCEDURE — 85520 HEPARIN ASSAY: CPT

## 2023-12-20 PROCEDURE — 94761 N-INVAS EAR/PLS OXIMETRY MLT: CPT

## 2023-12-20 PROCEDURE — 2000000000 HC ICU R&B

## 2023-12-20 PROCEDURE — 99213 OFFICE O/P EST LOW 20 MIN: CPT

## 2023-12-20 PROCEDURE — 51798 US URINE CAPACITY MEASURE: CPT

## 2023-12-20 PROCEDURE — 99232 SBSQ HOSP IP/OBS MODERATE 35: CPT | Performed by: STUDENT IN AN ORGANIZED HEALTH CARE EDUCATION/TRAINING PROGRAM

## 2023-12-20 PROCEDURE — 2500000003 HC RX 250 WO HCPCS

## 2023-12-20 PROCEDURE — 6370000000 HC RX 637 (ALT 250 FOR IP): Performed by: EMERGENCY MEDICINE

## 2023-12-20 PROCEDURE — 82947 ASSAY GLUCOSE BLOOD QUANT: CPT

## 2023-12-20 RX ORDER — DEXTROSE MONOHYDRATE 100 MG/ML
INJECTION, SOLUTION INTRAVENOUS CONTINUOUS PRN
Status: DISCONTINUED | OUTPATIENT
Start: 2023-12-20 | End: 2023-12-22 | Stop reason: SDUPTHER

## 2023-12-20 RX ADMIN — SODIUM CHLORIDE, PRESERVATIVE FREE 10 ML: 5 INJECTION INTRAVENOUS at 09:00

## 2023-12-20 RX ADMIN — FAMOTIDINE 20 MG: 20 TABLET, FILM COATED ORAL at 21:10

## 2023-12-20 RX ADMIN — CEFOXITIN SODIUM 2000 MG: 2 POWDER, FOR SOLUTION INTRAVENOUS at 14:30

## 2023-12-20 RX ADMIN — INSULIN HUMAN 10 UNITS: 100 INJECTION, SOLUTION PARENTERAL at 12:39

## 2023-12-20 RX ADMIN — DEXTROSE MONOHYDRATE 250 ML: 100 INJECTION, SOLUTION INTRAVENOUS at 12:37

## 2023-12-20 RX ADMIN — DEXMEDETOMIDINE HYDROCHLORIDE 0.2 MCG/KG/HR: 400 INJECTION, SOLUTION INTRAVENOUS at 17:31

## 2023-12-20 RX ADMIN — INSULIN LISPRO 1 UNITS: 100 INJECTION, SOLUTION INTRAVENOUS; SUBCUTANEOUS at 02:51

## 2023-12-20 RX ADMIN — HEPARIN SODIUM 16 UNITS/KG/HR: 10000 INJECTION, SOLUTION INTRAVENOUS at 13:07

## 2023-12-20 RX ADMIN — CEFOXITIN SODIUM 2000 MG: 2 POWDER, FOR SOLUTION INTRAVENOUS at 23:09

## 2023-12-20 RX ADMIN — HEPARIN SODIUM 16 UNITS/KG/HR: 10000 INJECTION, SOLUTION INTRAVENOUS at 12:41

## 2023-12-20 RX ADMIN — ALBUTEROL SULFATE 2.5 MG: 2.5 SOLUTION RESPIRATORY (INHALATION) at 06:05

## 2023-12-20 RX ADMIN — IPRATROPIUM BROMIDE AND ALBUTEROL SULFATE 1 DOSE: 2.5; .5 SOLUTION RESPIRATORY (INHALATION) at 20:53

## 2023-12-20 RX ADMIN — CEFOXITIN SODIUM 2000 MG: 2 POWDER, FOR SOLUTION INTRAVENOUS at 06:21

## 2023-12-20 RX ADMIN — SODIUM CHLORIDE: 9 INJECTION, SOLUTION INTRAVENOUS at 23:06

## 2023-12-20 RX ADMIN — SODIUM CHLORIDE, PRESERVATIVE FREE 10 ML: 5 INJECTION INTRAVENOUS at 21:10

## 2023-12-20 RX ADMIN — Medication 6 MG: at 21:10

## 2023-12-20 RX ADMIN — IPRATROPIUM BROMIDE AND ALBUTEROL SULFATE 1 DOSE: 2.5; .5 SOLUTION RESPIRATORY (INHALATION) at 15:21

## 2023-12-20 ASSESSMENT — PAIN SCALES - GENERAL: PAINLEVEL_OUTOF10: 0

## 2023-12-21 LAB
ANION GAP SERPL CALCULATED.3IONS-SCNC: 8 MMOL/L (ref 9–17)
ANTI-XA UNFRAC HEPARIN: 0.33 IU/L
BASOPHILS # BLD: 0.03 K/UL (ref 0–0.2)
BASOPHILS NFR BLD: 0 % (ref 0–2)
BUN SERPL-MCNC: 46 MG/DL (ref 8–23)
CALCIUM SERPL-MCNC: 8.2 MG/DL (ref 8.6–10.4)
CHLORIDE SERPL-SCNC: 112 MMOL/L (ref 98–107)
CO2 SERPL-SCNC: 23 MMOL/L (ref 20–31)
CREAT SERPL-MCNC: 0.7 MG/DL (ref 0.7–1.2)
EOSINOPHIL # BLD: <0.03 K/UL (ref 0–0.44)
EOSINOPHILS RELATIVE PERCENT: 0 % (ref 1–4)
ERYTHROCYTE [DISTWIDTH] IN BLOOD BY AUTOMATED COUNT: 13.9 % (ref 11.8–14.4)
GFR SERPL CREATININE-BSD FRML MDRD: >60 ML/MIN/1.73M2
GLUCOSE BLD-MCNC: 108 MG/DL (ref 75–110)
GLUCOSE BLD-MCNC: 110 MG/DL (ref 75–110)
GLUCOSE BLD-MCNC: 153 MG/DL (ref 75–110)
GLUCOSE SERPL-MCNC: 153 MG/DL (ref 70–99)
HCT VFR BLD AUTO: 40.7 % (ref 40.7–50.3)
HGB BLD-MCNC: 12.2 G/DL (ref 13–17)
IMM GRANULOCYTES # BLD AUTO: 0.18 K/UL (ref 0–0.3)
IMM GRANULOCYTES NFR BLD: 1 %
LYMPHOCYTES NFR BLD: 1.01 K/UL (ref 1.1–3.7)
LYMPHOCYTES RELATIVE PERCENT: 8 % (ref 24–43)
MCH RBC QN AUTO: 30.8 PG (ref 25.2–33.5)
MCHC RBC AUTO-ENTMCNC: 30 G/DL (ref 28.4–34.8)
MCV RBC AUTO: 102.8 FL (ref 82.6–102.9)
MONOCYTES NFR BLD: 0.55 K/UL (ref 0.1–1.2)
MONOCYTES NFR BLD: 4 % (ref 3–12)
NEUTROPHILS NFR BLD: 87 % (ref 36–65)
NEUTS SEG NFR BLD: 11.73 K/UL (ref 1.5–8.1)
NRBC BLD-RTO: 0.1 PER 100 WBC
PLATELET # BLD AUTO: 104 K/UL (ref 138–453)
PMV BLD AUTO: 12 FL (ref 8.1–13.5)
POTASSIUM SERPL-SCNC: 5.1 MMOL/L (ref 3.7–5.3)
RBC # BLD AUTO: 3.96 M/UL (ref 4.21–5.77)
SODIUM SERPL-SCNC: 143 MMOL/L (ref 135–144)
VIT B1 PYROPHOSHATE BLD-SCNC: 169 NMOL/L (ref 70–180)
WBC OTHER # BLD: 13.5 K/UL (ref 3.5–11.3)

## 2023-12-21 PROCEDURE — 97530 THERAPEUTIC ACTIVITIES: CPT

## 2023-12-21 PROCEDURE — 80048 BASIC METABOLIC PNL TOTAL CA: CPT

## 2023-12-21 PROCEDURE — 97110 THERAPEUTIC EXERCISES: CPT

## 2023-12-21 PROCEDURE — 99233 SBSQ HOSP IP/OBS HIGH 50: CPT | Performed by: INTERNAL MEDICINE

## 2023-12-21 PROCEDURE — 6370000000 HC RX 637 (ALT 250 FOR IP): Performed by: EMERGENCY MEDICINE

## 2023-12-21 PROCEDURE — 82947 ASSAY GLUCOSE BLOOD QUANT: CPT

## 2023-12-21 PROCEDURE — 2060000000 HC ICU INTERMEDIATE R&B

## 2023-12-21 PROCEDURE — 36415 COLL VENOUS BLD VENIPUNCTURE: CPT

## 2023-12-21 PROCEDURE — 6370000000 HC RX 637 (ALT 250 FOR IP)

## 2023-12-21 PROCEDURE — 94640 AIRWAY INHALATION TREATMENT: CPT

## 2023-12-21 PROCEDURE — 85025 COMPLETE CBC W/AUTO DIFF WBC: CPT

## 2023-12-21 PROCEDURE — 6360000002 HC RX W HCPCS: Performed by: STUDENT IN AN ORGANIZED HEALTH CARE EDUCATION/TRAINING PROGRAM

## 2023-12-21 PROCEDURE — 97535 SELF CARE MNGMENT TRAINING: CPT

## 2023-12-21 PROCEDURE — 2000000000 HC ICU R&B

## 2023-12-21 PROCEDURE — 2700000000 HC OXYGEN THERAPY PER DAY

## 2023-12-21 PROCEDURE — 6360000002 HC RX W HCPCS

## 2023-12-21 PROCEDURE — 94761 N-INVAS EAR/PLS OXIMETRY MLT: CPT

## 2023-12-21 PROCEDURE — 99291 CRITICAL CARE FIRST HOUR: CPT | Performed by: INTERNAL MEDICINE

## 2023-12-21 PROCEDURE — 6360000002 HC RX W HCPCS: Performed by: INTERNAL MEDICINE

## 2023-12-21 PROCEDURE — 2580000003 HC RX 258: Performed by: INTERNAL MEDICINE

## 2023-12-21 PROCEDURE — 2580000003 HC RX 258

## 2023-12-21 PROCEDURE — 2500000003 HC RX 250 WO HCPCS

## 2023-12-21 PROCEDURE — 85520 HEPARIN ASSAY: CPT

## 2023-12-21 RX ORDER — FENTANYL CITRATE 50 UG/ML
25 INJECTION, SOLUTION INTRAMUSCULAR; INTRAVENOUS ONCE
Status: COMPLETED | OUTPATIENT
Start: 2023-12-21 | End: 2023-12-21

## 2023-12-21 RX ADMIN — CEFOXITIN SODIUM 2000 MG: 2 POWDER, FOR SOLUTION INTRAVENOUS at 06:55

## 2023-12-21 RX ADMIN — FENTANYL CITRATE 25 MCG: 50 INJECTION, SOLUTION INTRAMUSCULAR; INTRAVENOUS at 17:40

## 2023-12-21 RX ADMIN — DEXMEDETOMIDINE HYDROCHLORIDE 0.8 MCG/KG/HR: 400 INJECTION, SOLUTION INTRAVENOUS at 04:01

## 2023-12-21 RX ADMIN — IPRATROPIUM BROMIDE AND ALBUTEROL SULFATE 1 DOSE: 2.5; .5 SOLUTION RESPIRATORY (INHALATION) at 08:09

## 2023-12-21 RX ADMIN — CEFOXITIN SODIUM 2000 MG: 2 POWDER, FOR SOLUTION INTRAVENOUS at 22:43

## 2023-12-21 RX ADMIN — SODIUM CHLORIDE, PRESERVATIVE FREE 10 ML: 5 INJECTION INTRAVENOUS at 08:57

## 2023-12-21 RX ADMIN — FAMOTIDINE 20 MG: 20 TABLET, FILM COATED ORAL at 08:56

## 2023-12-21 RX ADMIN — IPRATROPIUM BROMIDE AND ALBUTEROL SULFATE 1 DOSE: 2.5; .5 SOLUTION RESPIRATORY (INHALATION) at 15:12

## 2023-12-21 RX ADMIN — SODIUM CHLORIDE, PRESERVATIVE FREE 10 ML: 5 INJECTION INTRAVENOUS at 19:34

## 2023-12-21 RX ADMIN — CEFOXITIN SODIUM 2000 MG: 2 POWDER, FOR SOLUTION INTRAVENOUS at 15:14

## 2023-12-21 RX ADMIN — ATORVASTATIN CALCIUM 20 MG: 40 TABLET, FILM COATED ORAL at 08:57

## 2023-12-21 RX ADMIN — DEXMEDETOMIDINE HYDROCHLORIDE 0.6 MCG/KG/HR: 400 INJECTION, SOLUTION INTRAVENOUS at 09:03

## 2023-12-21 RX ADMIN — HEPARIN SODIUM 16 UNITS/KG/HR: 10000 INJECTION, SOLUTION INTRAVENOUS at 04:05

## 2023-12-21 RX ADMIN — IPRATROPIUM BROMIDE AND ALBUTEROL SULFATE 1 DOSE: 2.5; .5 SOLUTION RESPIRATORY (INHALATION) at 19:59

## 2023-12-21 RX ADMIN — Medication 6 MG: at 19:33

## 2023-12-21 RX ADMIN — ACETAMINOPHEN 650 MG: 325 TABLET ORAL at 21:20

## 2023-12-21 RX ADMIN — FAMOTIDINE 20 MG: 20 TABLET, FILM COATED ORAL at 19:33

## 2023-12-21 ASSESSMENT — PAIN DESCRIPTION - LOCATION: LOCATION: BACK;COCCYX

## 2023-12-21 ASSESSMENT — PAIN SCALES - GENERAL
PAINLEVEL_OUTOF10: 0
PAINLEVEL_OUTOF10: 0

## 2023-12-22 ENCOUNTER — APPOINTMENT (OUTPATIENT)
Dept: GENERAL RADIOLOGY | Age: 83
DRG: 208 | End: 2023-12-22
Payer: MEDICARE

## 2023-12-22 PROBLEM — M25.561 ACUTE PAIN OF RIGHT KNEE: Status: ACTIVE | Noted: 2023-12-22

## 2023-12-22 LAB
ALLEN TEST: POSITIVE
ANION GAP SERPL CALCULATED.3IONS-SCNC: 10 MMOL/L (ref 9–17)
ANION GAP SERPL CALCULATED.3IONS-SCNC: 8 MMOL/L (ref 9–17)
ANTI-XA UNFRAC HEPARIN: 0.15 IU/L
BASOPHILS # BLD: 0.05 K/UL (ref 0–0.2)
BASOPHILS NFR BLD: 0 % (ref 0–2)
BUN SERPL-MCNC: 31 MG/DL (ref 8–23)
BUN SERPL-MCNC: 33 MG/DL (ref 8–23)
CALCIUM SERPL-MCNC: 8.7 MG/DL (ref 8.6–10.4)
CALCIUM SERPL-MCNC: 8.8 MG/DL (ref 8.6–10.4)
CHLORIDE SERPL-SCNC: 104 MMOL/L (ref 98–107)
CHLORIDE SERPL-SCNC: 106 MMOL/L (ref 98–107)
CO2 SERPL-SCNC: 24 MMOL/L (ref 20–31)
CO2 SERPL-SCNC: 24 MMOL/L (ref 20–31)
CREAT SERPL-MCNC: 0.6 MG/DL (ref 0.7–1.2)
CREAT SERPL-MCNC: 0.6 MG/DL (ref 0.7–1.2)
EOSINOPHIL # BLD: 0.04 K/UL (ref 0–0.44)
EOSINOPHILS RELATIVE PERCENT: 0 % (ref 1–4)
ERYTHROCYTE [DISTWIDTH] IN BLOOD BY AUTOMATED COUNT: 13.6 % (ref 11.8–14.4)
FIO2: 3
GFR SERPL CREATININE-BSD FRML MDRD: >60 ML/MIN/1.73M2
GFR SERPL CREATININE-BSD FRML MDRD: >60 ML/MIN/1.73M2
GLUCOSE BLD-MCNC: 137 MG/DL (ref 75–110)
GLUCOSE BLD-MCNC: 139 MG/DL (ref 75–110)
GLUCOSE BLD-MCNC: 149 MG/DL (ref 75–110)
GLUCOSE BLD-MCNC: 149 MG/DL (ref 75–110)
GLUCOSE BLD-MCNC: 152 MG/DL (ref 75–110)
GLUCOSE SERPL-MCNC: 127 MG/DL (ref 70–99)
GLUCOSE SERPL-MCNC: 132 MG/DL (ref 70–99)
HCT VFR BLD AUTO: 47.9 % (ref 40.7–50.3)
HGB BLD-MCNC: 15.2 G/DL (ref 13–17)
IMM GRANULOCYTES # BLD AUTO: 0.26 K/UL (ref 0–0.3)
IMM GRANULOCYTES NFR BLD: 1 %
INR PPP: 1.2
LYMPHOCYTES NFR BLD: 0.77 K/UL (ref 1.1–3.7)
LYMPHOCYTES RELATIVE PERCENT: 4 % (ref 24–43)
MAGNESIUM SERPL-MCNC: 2.9 MG/DL (ref 1.6–2.6)
MCH RBC QN AUTO: 30.9 PG (ref 25.2–33.5)
MCHC RBC AUTO-ENTMCNC: 31.7 G/DL (ref 28.4–34.8)
MCV RBC AUTO: 97.4 FL (ref 82.6–102.9)
MONOCYTES NFR BLD: 0.59 K/UL (ref 0.1–1.2)
MONOCYTES NFR BLD: 3 % (ref 3–12)
NEUTROPHILS NFR BLD: 91 % (ref 36–65)
NEUTS SEG NFR BLD: 17.75 K/UL (ref 1.5–8.1)
NRBC BLD-RTO: 0.2 PER 100 WBC
O2 DELIVERY DEVICE: ABNORMAL
PLATELET # BLD AUTO: 143 K/UL (ref 138–453)
PMV BLD AUTO: 12.7 FL (ref 8.1–13.5)
POC HCO3: 27.6 MMOL/L (ref 21–28)
POC O2 SATURATION: 91.4 % (ref 94–98)
POC PCO2: 33.3 MM HG (ref 35–48)
POC PH: 7.53 (ref 7.35–7.45)
POC PO2: 54.6 MM HG (ref 83–108)
POSITIVE BASE EXCESS, ART: 5.2 MMOL/L (ref 0–3)
POTASSIUM SERPL-SCNC: 4.6 MMOL/L (ref 3.7–5.3)
POTASSIUM SERPL-SCNC: 4.6 MMOL/L (ref 3.7–5.3)
PROTHROMBIN TIME: 14.8 SEC (ref 11.7–14.9)
RBC # BLD AUTO: 4.92 M/UL (ref 4.21–5.77)
SAMPLE SITE: ABNORMAL
SODIUM SERPL-SCNC: 136 MMOL/L (ref 135–144)
SODIUM SERPL-SCNC: 140 MMOL/L (ref 135–144)
WBC OTHER # BLD: 19.5 K/UL (ref 3.5–11.3)

## 2023-12-22 PROCEDURE — 80048 BASIC METABOLIC PNL TOTAL CA: CPT

## 2023-12-22 PROCEDURE — 99232 SBSQ HOSP IP/OBS MODERATE 35: CPT | Performed by: INTERNAL MEDICINE

## 2023-12-22 PROCEDURE — 85520 HEPARIN ASSAY: CPT

## 2023-12-22 PROCEDURE — 82947 ASSAY GLUCOSE BLOOD QUANT: CPT

## 2023-12-22 PROCEDURE — 36415 COLL VENOUS BLD VENIPUNCTURE: CPT

## 2023-12-22 PROCEDURE — 6370000000 HC RX 637 (ALT 250 FOR IP)

## 2023-12-22 PROCEDURE — 83735 ASSAY OF MAGNESIUM: CPT

## 2023-12-22 PROCEDURE — 31720 CLEARANCE OF AIRWAYS: CPT

## 2023-12-22 PROCEDURE — 6370000000 HC RX 637 (ALT 250 FOR IP): Performed by: EMERGENCY MEDICINE

## 2023-12-22 PROCEDURE — 99233 SBSQ HOSP IP/OBS HIGH 50: CPT | Performed by: NURSE PRACTITIONER

## 2023-12-22 PROCEDURE — 2580000003 HC RX 258

## 2023-12-22 PROCEDURE — 6360000002 HC RX W HCPCS: Performed by: INTERNAL MEDICINE

## 2023-12-22 PROCEDURE — 94640 AIRWAY INHALATION TREATMENT: CPT

## 2023-12-22 PROCEDURE — 85025 COMPLETE CBC W/AUTO DIFF WBC: CPT

## 2023-12-22 PROCEDURE — 2580000003 HC RX 258: Performed by: INTERNAL MEDICINE

## 2023-12-22 PROCEDURE — 99221 1ST HOSP IP/OBS SF/LOW 40: CPT | Performed by: SURGERY

## 2023-12-22 PROCEDURE — 6360000002 HC RX W HCPCS: Performed by: STUDENT IN AN ORGANIZED HEALTH CARE EDUCATION/TRAINING PROGRAM

## 2023-12-22 PROCEDURE — 2700000000 HC OXYGEN THERAPY PER DAY

## 2023-12-22 PROCEDURE — 74230 X-RAY XM SWLNG FUNCJ C+: CPT

## 2023-12-22 PROCEDURE — 36600 WITHDRAWAL OF ARTERIAL BLOOD: CPT

## 2023-12-22 PROCEDURE — 99223 1ST HOSP IP/OBS HIGH 75: CPT | Performed by: STUDENT IN AN ORGANIZED HEALTH CARE EDUCATION/TRAINING PROGRAM

## 2023-12-22 PROCEDURE — 94761 N-INVAS EAR/PLS OXIMETRY MLT: CPT

## 2023-12-22 PROCEDURE — 82803 BLOOD GASES ANY COMBINATION: CPT

## 2023-12-22 PROCEDURE — 97530 THERAPEUTIC ACTIVITIES: CPT

## 2023-12-22 PROCEDURE — 2500000003 HC RX 250 WO HCPCS: Performed by: HOSPITALIST

## 2023-12-22 PROCEDURE — 2060000000 HC ICU INTERMEDIATE R&B

## 2023-12-22 PROCEDURE — 6360000002 HC RX W HCPCS: Performed by: HOSPITALIST

## 2023-12-22 PROCEDURE — 2500000003 HC RX 250 WO HCPCS

## 2023-12-22 PROCEDURE — 99232 SBSQ HOSP IP/OBS MODERATE 35: CPT | Performed by: HOSPITALIST

## 2023-12-22 PROCEDURE — 92611 MOTION FLUOROSCOPY/SWALLOW: CPT

## 2023-12-22 PROCEDURE — 85610 PROTHROMBIN TIME: CPT

## 2023-12-22 RX ORDER — ENOXAPARIN SODIUM 100 MG/ML
1 INJECTION SUBCUTANEOUS 2 TIMES DAILY
Status: DISCONTINUED | OUTPATIENT
Start: 2023-12-22 | End: 2023-12-31

## 2023-12-22 RX ORDER — WARFARIN SODIUM 5 MG/1
5 TABLET ORAL
Status: DISCONTINUED | OUTPATIENT
Start: 2023-12-22 | End: 2023-12-22

## 2023-12-22 RX ORDER — GUAIFENESIN 200 MG/10ML
200 LIQUID ORAL EVERY 6 HOURS
Status: DISCONTINUED | OUTPATIENT
Start: 2023-12-22 | End: 2024-01-01

## 2023-12-22 RX ADMIN — METOPROLOL TARTRATE 12.5 MG: 25 TABLET ORAL at 10:14

## 2023-12-22 RX ADMIN — GUAIFENESIN 200 MG: 200 SOLUTION ORAL at 18:25

## 2023-12-22 RX ADMIN — ATORVASTATIN CALCIUM 20 MG: 40 TABLET, FILM COATED ORAL at 08:17

## 2023-12-22 RX ADMIN — ACETAMINOPHEN 650 MG: 325 TABLET ORAL at 20:37

## 2023-12-22 RX ADMIN — CEFOXITIN SODIUM 2000 MG: 2 POWDER, FOR SOLUTION INTRAVENOUS at 06:40

## 2023-12-22 RX ADMIN — FAMOTIDINE 20 MG: 20 TABLET, FILM COATED ORAL at 20:14

## 2023-12-22 RX ADMIN — IPRATROPIUM BROMIDE AND ALBUTEROL SULFATE 1 DOSE: 2.5; .5 SOLUTION RESPIRATORY (INHALATION) at 15:58

## 2023-12-22 RX ADMIN — IPRATROPIUM BROMIDE AND ALBUTEROL SULFATE 1 DOSE: 2.5; .5 SOLUTION RESPIRATORY (INHALATION) at 21:17

## 2023-12-22 RX ADMIN — HEPARIN SODIUM 1910 UNITS: 1000 INJECTION INTRAVENOUS; SUBCUTANEOUS at 06:47

## 2023-12-22 RX ADMIN — HEPARIN SODIUM 16 UNITS/KG/HR: 10000 INJECTION, SOLUTION INTRAVENOUS at 03:20

## 2023-12-22 RX ADMIN — METOPROLOL TARTRATE 5 MG: 5 INJECTION INTRAVENOUS at 02:43

## 2023-12-22 RX ADMIN — ENOXAPARIN SODIUM 70 MG: 100 INJECTION SUBCUTANEOUS at 10:14

## 2023-12-22 RX ADMIN — METOPROLOL TARTRATE 12.5 MG: 25 TABLET ORAL at 20:14

## 2023-12-22 RX ADMIN — FAMOTIDINE 20 MG: 20 TABLET, FILM COATED ORAL at 08:17

## 2023-12-22 RX ADMIN — SODIUM CHLORIDE, PRESERVATIVE FREE 10 ML: 5 INJECTION INTRAVENOUS at 08:18

## 2023-12-22 RX ADMIN — SODIUM CHLORIDE, PRESERVATIVE FREE 10 ML: 5 INJECTION INTRAVENOUS at 20:14

## 2023-12-22 RX ADMIN — Medication 6 MG: at 20:14

## 2023-12-22 RX ADMIN — GUAIFENESIN 200 MG: 200 SOLUTION ORAL at 13:07

## 2023-12-22 RX ADMIN — IPRATROPIUM BROMIDE AND ALBUTEROL SULFATE 1 DOSE: 2.5; .5 SOLUTION RESPIRATORY (INHALATION) at 08:53

## 2023-12-22 ASSESSMENT — PAIN SCALES - GENERAL: PAINLEVEL_OUTOF10: 0

## 2023-12-23 ENCOUNTER — APPOINTMENT (OUTPATIENT)
Dept: GENERAL RADIOLOGY | Age: 83
DRG: 208 | End: 2023-12-23
Payer: MEDICARE

## 2023-12-23 PROBLEM — R13.12 OROPHARYNGEAL DYSPHAGIA: Status: ACTIVE | Noted: 2023-12-23

## 2023-12-23 LAB
ALLEN TEST: POSITIVE
ANION GAP SERPL CALCULATED.3IONS-SCNC: 8 MMOL/L (ref 9–17)
BASOPHILS # BLD: 0.06 K/UL (ref 0–0.2)
BASOPHILS NFR BLD: 0 % (ref 0–2)
BUN SERPL-MCNC: 33 MG/DL (ref 8–23)
CALCIUM SERPL-MCNC: 8.8 MG/DL (ref 8.6–10.4)
CHLORIDE SERPL-SCNC: 104 MMOL/L (ref 98–107)
CO2 SERPL-SCNC: 24 MMOL/L (ref 20–31)
CREAT SERPL-MCNC: 0.6 MG/DL (ref 0.7–1.2)
EOSINOPHIL # BLD: 0.03 K/UL (ref 0–0.44)
EOSINOPHILS RELATIVE PERCENT: 0 % (ref 1–4)
ERYTHROCYTE [DISTWIDTH] IN BLOOD BY AUTOMATED COUNT: 13.9 % (ref 11.8–14.4)
FIO2: 60
GFR SERPL CREATININE-BSD FRML MDRD: >60 ML/MIN/1.73M2
GLUCOSE BLD-MCNC: 112 MG/DL (ref 75–110)
GLUCOSE BLD-MCNC: 126 MG/DL (ref 75–110)
GLUCOSE BLD-MCNC: 134 MG/DL (ref 75–110)
GLUCOSE SERPL-MCNC: 122 MG/DL (ref 70–99)
HCT VFR BLD AUTO: 49.1 % (ref 40.7–50.3)
HGB BLD-MCNC: 14.9 G/DL (ref 13–17)
IMM GRANULOCYTES # BLD AUTO: 0.34 K/UL (ref 0–0.3)
IMM GRANULOCYTES NFR BLD: 2 %
INR PPP: 1.3
LYMPHOCYTES NFR BLD: 0.88 K/UL (ref 1.1–3.7)
LYMPHOCYTES RELATIVE PERCENT: 5 % (ref 24–43)
MCH RBC QN AUTO: 31.1 PG (ref 25.2–33.5)
MCHC RBC AUTO-ENTMCNC: 30.3 G/DL (ref 28.4–34.8)
MCV RBC AUTO: 102.5 FL (ref 82.6–102.9)
MICROORGANISM SPEC CULT: ABNORMAL
MICROORGANISM SPEC CULT: NORMAL
MICROORGANISM/AGENT SPEC: ABNORMAL
MICROORGANISM/AGENT SPEC: NORMAL
MONOCYTES NFR BLD: 0.69 K/UL (ref 0.1–1.2)
MONOCYTES NFR BLD: 4 % (ref 3–12)
NEUTROPHILS NFR BLD: 89 % (ref 36–65)
NEUTS SEG NFR BLD: 16.4 K/UL (ref 1.5–8.1)
NRBC BLD-RTO: 0 PER 100 WBC
PLATELET # BLD AUTO: 116 K/UL (ref 138–453)
PMV BLD AUTO: 11.9 FL (ref 8.1–13.5)
POC HCO3: 22.9 MMOL/L (ref 21–28)
POC O2 SATURATION: 98 % (ref 94–98)
POC PCO2: 30.9 MM HG (ref 35–48)
POC PH: 7.48 (ref 7.35–7.45)
POC PO2: 94.8 MM HG (ref 83–108)
POSITIVE BASE EXCESS, ART: 0.3 MMOL/L (ref 0–3)
POTASSIUM SERPL-SCNC: 4.7 MMOL/L (ref 3.7–5.3)
PROTHROMBIN TIME: 15.9 SEC (ref 11.7–14.9)
RBC # BLD AUTO: 4.79 M/UL (ref 4.21–5.77)
SAMPLE SITE: ABNORMAL
SERVICE CMNT-IMP: ABNORMAL
SERVICE CMNT-IMP: NORMAL
SODIUM SERPL-SCNC: 136 MMOL/L (ref 135–144)
SPECIMEN DESCRIPTION: ABNORMAL
SPECIMEN DESCRIPTION: NORMAL
WBC OTHER # BLD: 18.4 K/UL (ref 3.5–11.3)

## 2023-12-23 PROCEDURE — 2580000003 HC RX 258: Performed by: STUDENT IN AN ORGANIZED HEALTH CARE EDUCATION/TRAINING PROGRAM

## 2023-12-23 PROCEDURE — 0DJ08ZZ INSPECTION OF UPPER INTESTINAL TRACT, VIA NATURAL OR ARTIFICIAL OPENING ENDOSCOPIC: ICD-10-PCS | Performed by: SURGERY

## 2023-12-23 PROCEDURE — 87205 SMEAR GRAM STAIN: CPT

## 2023-12-23 PROCEDURE — 87070 CULTURE OTHR SPECIMN AEROBIC: CPT

## 2023-12-23 PROCEDURE — 2500000003 HC RX 250 WO HCPCS: Performed by: STUDENT IN AN ORGANIZED HEALTH CARE EDUCATION/TRAINING PROGRAM

## 2023-12-23 PROCEDURE — 99291 CRITICAL CARE FIRST HOUR: CPT | Performed by: INTERNAL MEDICINE

## 2023-12-23 PROCEDURE — 71045 X-RAY EXAM CHEST 1 VIEW: CPT

## 2023-12-23 PROCEDURE — 2500000003 HC RX 250 WO HCPCS

## 2023-12-23 PROCEDURE — 80048 BASIC METABOLIC PNL TOTAL CA: CPT

## 2023-12-23 PROCEDURE — 2500000003 HC RX 250 WO HCPCS: Performed by: HOSPITALIST

## 2023-12-23 PROCEDURE — 94761 N-INVAS EAR/PLS OXIMETRY MLT: CPT

## 2023-12-23 PROCEDURE — 85025 COMPLETE CBC W/AUTO DIFF WBC: CPT

## 2023-12-23 PROCEDURE — A4216 STERILE WATER/SALINE, 10 ML: HCPCS | Performed by: STUDENT IN AN ORGANIZED HEALTH CARE EDUCATION/TRAINING PROGRAM

## 2023-12-23 PROCEDURE — 99232 SBSQ HOSP IP/OBS MODERATE 35: CPT | Performed by: INTERNAL MEDICINE

## 2023-12-23 PROCEDURE — 82803 BLOOD GASES ANY COMBINATION: CPT

## 2023-12-23 PROCEDURE — 85610 PROTHROMBIN TIME: CPT

## 2023-12-23 PROCEDURE — 36600 WITHDRAWAL OF ARTERIAL BLOOD: CPT

## 2023-12-23 PROCEDURE — 89220 SPUTUM SPECIMEN COLLECTION: CPT

## 2023-12-23 PROCEDURE — 2000000000 HC ICU R&B

## 2023-12-23 PROCEDURE — 6370000000 HC RX 637 (ALT 250 FOR IP): Performed by: EMERGENCY MEDICINE

## 2023-12-23 PROCEDURE — 2700000000 HC OXYGEN THERAPY PER DAY

## 2023-12-23 PROCEDURE — 2709999900 HC NON-CHARGEABLE SUPPLY: Performed by: SURGERY

## 2023-12-23 PROCEDURE — 99232 SBSQ HOSP IP/OBS MODERATE 35: CPT | Performed by: STUDENT IN AN ORGANIZED HEALTH CARE EDUCATION/TRAINING PROGRAM

## 2023-12-23 PROCEDURE — 3E0G76Z INTRODUCTION OF NUTRITIONAL SUBSTANCE INTO UPPER GI, VIA NATURAL OR ARTIFICIAL OPENING: ICD-10-PCS | Performed by: SURGERY

## 2023-12-23 PROCEDURE — 82947 ASSAY GLUCOSE BLOOD QUANT: CPT

## 2023-12-23 PROCEDURE — 6370000000 HC RX 637 (ALT 250 FOR IP): Performed by: STUDENT IN AN ORGANIZED HEALTH CARE EDUCATION/TRAINING PROGRAM

## 2023-12-23 PROCEDURE — 94640 AIRWAY INHALATION TREATMENT: CPT

## 2023-12-23 PROCEDURE — 3700000000 HC ANESTHESIA ATTENDED CARE: Performed by: SURGERY

## 2023-12-23 PROCEDURE — 2580000003 HC RX 258: Performed by: SURGERY

## 2023-12-23 PROCEDURE — 6360000002 HC RX W HCPCS: Performed by: STUDENT IN AN ORGANIZED HEALTH CARE EDUCATION/TRAINING PROGRAM

## 2023-12-23 PROCEDURE — 3600000005 HC SURGERY LEVEL 5 BASE: Performed by: SURGERY

## 2023-12-23 PROCEDURE — 3600000015 HC SURGERY LEVEL 5 ADDTL 15MIN: Performed by: SURGERY

## 2023-12-23 PROCEDURE — 0DH60UZ INSERTION OF FEEDING DEVICE INTO STOMACH, OPEN APPROACH: ICD-10-PCS | Performed by: SURGERY

## 2023-12-23 PROCEDURE — 74018 RADEX ABDOMEN 1 VIEW: CPT

## 2023-12-23 PROCEDURE — 94002 VENT MGMT INPAT INIT DAY: CPT

## 2023-12-23 PROCEDURE — 87641 MR-STAPH DNA AMP PROBE: CPT

## 2023-12-23 PROCEDURE — 0BH17EZ INSERTION OF ENDOTRACHEAL AIRWAY INTO TRACHEA, VIA NATURAL OR ARTIFICIAL OPENING: ICD-10-PCS | Performed by: ANESTHESIOLOGY

## 2023-12-23 PROCEDURE — 36415 COLL VENOUS BLD VENIPUNCTURE: CPT

## 2023-12-23 PROCEDURE — 3700000001 HC ADD 15 MINUTES (ANESTHESIA): Performed by: SURGERY

## 2023-12-23 PROCEDURE — 5A1945Z RESPIRATORY VENTILATION, 24-96 CONSECUTIVE HOURS: ICD-10-PCS | Performed by: INTERNAL MEDICINE

## 2023-12-23 RX ORDER — SODIUM CHLORIDE 0.9 % (FLUSH) 0.9 %
5-40 SYRINGE (ML) INJECTION PRN
Status: DISCONTINUED | OUTPATIENT
Start: 2023-12-23 | End: 2024-01-08 | Stop reason: HOSPADM

## 2023-12-23 RX ORDER — SODIUM CHLORIDE 9 MG/ML
INJECTION, SOLUTION INTRAVENOUS CONTINUOUS
Status: DISCONTINUED | OUTPATIENT
Start: 2023-12-23 | End: 2023-12-28

## 2023-12-23 RX ORDER — NOREPINEPHRINE BITARTRATE 0.06 MG/ML
1-100 INJECTION, SOLUTION INTRAVENOUS CONTINUOUS
Status: DISCONTINUED | OUTPATIENT
Start: 2023-12-23 | End: 2023-12-28

## 2023-12-23 RX ORDER — NOREPINEPHRINE BITARTRATE 0.06 MG/ML
INJECTION, SOLUTION INTRAVENOUS
Status: COMPLETED
Start: 2023-12-23 | End: 2023-12-23

## 2023-12-23 RX ORDER — PROPOFOL 10 MG/ML
5-50 INJECTION, EMULSION INTRAVENOUS CONTINUOUS
Status: DISCONTINUED | OUTPATIENT
Start: 2023-12-23 | End: 2023-12-24

## 2023-12-23 RX ORDER — SODIUM CHLORIDE 0.9 % (FLUSH) 0.9 %
5-40 SYRINGE (ML) INJECTION EVERY 12 HOURS SCHEDULED
Status: DISCONTINUED | OUTPATIENT
Start: 2023-12-23 | End: 2024-01-08 | Stop reason: HOSPADM

## 2023-12-23 RX ORDER — MAGNESIUM HYDROXIDE 1200 MG/15ML
LIQUID ORAL CONTINUOUS PRN
Status: COMPLETED | OUTPATIENT
Start: 2023-12-23 | End: 2023-12-23

## 2023-12-23 RX ORDER — 0.9 % SODIUM CHLORIDE 0.9 %
500 INTRAVENOUS SOLUTION INTRAVENOUS ONCE
Status: DISCONTINUED | OUTPATIENT
Start: 2023-12-23 | End: 2023-12-28

## 2023-12-23 RX ORDER — SODIUM CHLORIDE 9 MG/ML
INJECTION, SOLUTION INTRAVENOUS PRN
Status: DISCONTINUED | OUTPATIENT
Start: 2023-12-23 | End: 2024-01-08 | Stop reason: HOSPADM

## 2023-12-23 RX ORDER — DEXMEDETOMIDINE HYDROCHLORIDE 4 UG/ML
.1-1.5 INJECTION, SOLUTION INTRAVENOUS CONTINUOUS
Status: DISCONTINUED | OUTPATIENT
Start: 2023-12-23 | End: 2023-12-28

## 2023-12-23 RX ADMIN — Medication 5 MCG/MIN: at 17:30

## 2023-12-23 RX ADMIN — GUAIFENESIN 200 MG: 200 SOLUTION ORAL at 00:02

## 2023-12-23 RX ADMIN — SODIUM CHLORIDE, PRESERVATIVE FREE 20 MG: 5 INJECTION INTRAVENOUS at 20:31

## 2023-12-23 RX ADMIN — SODIUM CHLORIDE, PRESERVATIVE FREE 10 ML: 5 INJECTION INTRAVENOUS at 20:27

## 2023-12-23 RX ADMIN — PROPOFOL 15 MCG/KG/MIN: 10 INJECTION, EMULSION INTRAVENOUS at 19:53

## 2023-12-23 RX ADMIN — SODIUM CHLORIDE: 9 INJECTION, SOLUTION INTRAVENOUS at 20:19

## 2023-12-23 RX ADMIN — DEXMEDETOMIDINE HYDROCHLORIDE 0.2 MCG/KG/HR: 400 INJECTION, SOLUTION INTRAVENOUS at 20:45

## 2023-12-23 RX ADMIN — NOREPINEPHRINE BITARTRATE 5 MCG/MIN: 0.06 INJECTION, SOLUTION INTRAVENOUS at 17:30

## 2023-12-23 RX ADMIN — IPRATROPIUM BROMIDE AND ALBUTEROL SULFATE 1 DOSE: 2.5; .5 SOLUTION RESPIRATORY (INHALATION) at 07:28

## 2023-12-23 RX ADMIN — Medication 500 ML: at 20:47

## 2023-12-23 RX ADMIN — IPRATROPIUM BROMIDE AND ALBUTEROL SULFATE 1 DOSE: 2.5; .5 SOLUTION RESPIRATORY (INHALATION) at 20:13

## 2023-12-23 ASSESSMENT — PULMONARY FUNCTION TESTS
PIF_VALUE: 20
PIF_VALUE: 21
PIF_VALUE: 24

## 2023-12-23 ASSESSMENT — PAIN - FUNCTIONAL ASSESSMENT: PAIN_FUNCTIONAL_ASSESSMENT: WONG-BAKER FACES

## 2023-12-23 NOTE — FLOWSHEET NOTE
EGD/PEG finished and unable to obtain Peg tube placement. Will move patient to another OR for Open G-tube.

## 2023-12-23 NOTE — H&P
Critical Care - History and Physical Examination    Patient's name:  Caio Arce  Medical Record Number: [id number]  Patient's account/billing number: [id number]  Patient's YOB: 1940  Age: 83 y.o.  Date of Admission: 12/10/2023  6:59 AM  Reason of ICU admission:   Date of History and Physical Examination: 12/14/2023      Primary Care Physician: Jaqui Watkins PA-C  Attending Physician: Dr. CORRY Marshall    Code Status: Full Code    Chief complaint: Flulike symptoms, diarrhea, congestion    Reason for ICU admission: Acute hypoxic respiratory failure      History Of Present Illness:   History was obtained from child and chart review.      Caio Arce is a 83 y.o. male with history of BPH, hypertension, hyperlipidemia, HFrEF EF 45-50 in 2023, mitral valve replacement 2014, on Coumadin with PPM s/p complete heart block.     Patient was admitted on 12/10 due to diarrhea, and myalgia, congestion, cough with greenish phleghm.  He was tested to be COVID positive.    Worsening left hilar infiltrate on chest x-ray, worsening leukocytosis, getting more altered. Critical care paged to assess mentation.    Consult Services: INFECTIOUS DISEASES    Past Medical History:        Diagnosis Date    Anxiety     Arthritis     BPH (benign prostatic hyperplasia)     Constipation     COPD (chronic obstructive pulmonary disease) (HCC)     Current use of aspirin     Hyperglycemia     Hyperlipidemia     Hypertension     MVP (mitral valve prolapse)     Osteoarthritis     Reflux     Wears glasses        Past Surgical History:        Procedure Laterality Date    CARDIAC VALVE REPLACEMENT  10/14/14    INGUINAL HERNIA REPAIR Bilateral 94, 96    OTHER SURGICAL HISTORY Left 01/16/2014    excision of groin mass    PACEMAKER PLACEMENT  10/14/14    [facility]       Allergies:    No Known Allergies      Home Medications:   Prior to Admission medications    Medication Sig Start Date End Date Taking? Authorizing 
  [facility]     Department of Internal Medicine - Staff Internal Medicine Teaching Service          ADMISSION NOTE/HISTORY AND PHYSICAL EXAMINATION   Date: 12/10/2023  Patient Name: Caio Arce  Date of admission: 12/10/2023  6:59 AM  YOB: 1940  PCP: Jaqui Watkins PA-C  History Obtained From:  patient, electronic medical record    CHIEF COMPLAINT     Chief complaint: Flulike symptoms, diarrhea, congestion    HISTORY OF PRESENTING ILLNESS     The patient is a 83 y.o. male with history of BPH, hypertension, hyperlipidemia, HFrEF EF 45-50 in 2023, mitral valve replacement 2014, on Coumadin with PPM s/p complete heart block. He presents with diarrhea, myalgia, congestion, cough productive of greenish phlegm for the last 4 to 5 days.  The patient has also had right-sided abdominal pain during this time.  He has not had any known sick contacts with similar symptoms.  He denies any symptoms of melanotic stools, any bleeding per rectum or orally, any hematochezia or hemoptysis.  He has no known history of CKD, diabetes.  Chart review also reveals history of paroxysmal A-fib, the patient is unaware of this.  The patient has received the flu vaccine this season but has not received the COVID-vaccine.    Home meds: Allopurinol 100, Lipitor 20, metoprolol tartrate 25, warfarin     In the ED the patient was found to have hypokalemia 3.6, DAMARIS creatinine 1.4, troponin 38 from a normal baseline, INR 3.1, CT showed right middle lobe pneumonia, respiratory testing showed COVID-19 positive.  His curb 65 score was 2.  He received ceftriaxone and azithromycin, potassium replacement, normal saline infusion.  Internal medicine was consulted for admission.    Review of Systems   Constitutional:  Negative for chills and fever.   HENT:  Positive for congestion.    Respiratory:  Positive for cough and shortness of breath. Negative for wheezing.    Cardiovascular:  Negative for chest pain, 
MD Rubén   warfarin (COUMADIN) 2 MG tablet Take 1 tablet by mouth daily Indications: Monday, Wed, and friday 2mg, Tues, Thurs, and Sat, Sunday 1 mg  Patient taking differently: Take 3 mg by mouth daily  2/3/16 4/29/16  Jaqui Watkins PA-C   atorvastatin (LIPITOR) 20 MG tablet Take 1 tablet by mouth daily 1/14/16   Jaqui Watkins PA-C   Elastic Bandages & Supports (JOBST KNEE HIGH COMPRESSION SM) MISC 1 each by Does not apply route daily as needed  Patient not taking: Reported on 12/10/2023 6/10/15   Jaqui Watkins PA-C   senna-docusate (DOC-Q-LAX) 8.6-50 MG per tablet 1-2 po qd  Patient not taking: Reported on 7/20/2023 12/10/14   Jaqui Watkins PA-C   HYDROcodone-acetaminophen (NORCO) 5-325 MG per tablet 1 tablet every 6 hours as needed.  Patient not taking: Reported on 7/20/2023 9/24/14   ProviderRubén MD   aspirin 81 MG tablet Take 1 tablet by mouth daily    ProviderRubén MD       Social History:   TOBACCO:   reports that he has been smoking cigarettes and pipe. He started smoking about 63 years ago. He has never used smokeless tobacco.  ETOH:   reports current alcohol use of about 2.0 standard drinks of alcohol per week.  DRUGS:  reports no history of drug use.  OCCUPATION:      Family History:       Problem Relation Age of Onset    Dementia Mother     Heart Disease Father     Heart Attack Father     Colon Cancer Sister     High Blood Pressure Brother     Heart Disease Brother     Heart Attack Brother         defibrilator in       REVIEW OF SYSTEMS (ROS):  Could not be evaluated         Physical Exam:    Vitals: /73   Pulse 97   Temp 97.5 °F (36.4 °C) (Temporal)   Resp 22   Ht 1.753 m (5' 9.02\")   Wt 71.3 kg (157 lb 3 oz)   SpO2 97%   BMI 23.20 kg/m²     Last Body weight:   Wt Readings from Last 3 Encounters:   12/19/23 71.3 kg (157 lb 3 oz)   07/20/23 74.4 kg (164 lb)   06/08/23 74.4 kg (164 lb)       Body Mass Index : Body mass index is 23.2 kg/m².      PHYSICAL

## 2023-12-23 NOTE — FLOWSHEET NOTE
Patient moved to OR 10 for  open G-tube. Report given to receiving nurse. Transferred to table with assist X5.

## 2023-12-23 NOTE — BRIEF OP NOTE
Brief Postoperative Note      Patient: Caio Arce  YOB: 1940  MRN: [id number]    Date of Procedure: 12/23/2023    Pre-Op Diagnosis Codes:     * Dysphagia, unspecified type [R13.10]    Post-Op Diagnosis: Same       Procedure(s):  EGD DIAGNOSTIC ONLY  GASTROSTOMY TUBE PLACEMENT    Surgeon(s):  Caden Weaver MD Raimonde, Anthony J, MD    Assistant:  First Assistant: Priya Roman RN  Resident: Gavin Good DO    Anesthesia: General    Estimated Blood Loss (mL): 10ml    Complications: None    Specimens:   * No specimens in log *    Implants:  * No implants in log *      Drains:   NG/OG/NJ/NE Tube Nasogastric Left nostril (Active)   Surrounding Skin Clean, dry & intact 12/23/23 1200   Securement device Bridle 12/23/23 1200   Status Continuous feeding 12/23/23 1200   Placement Verified External Catheter Length;Respiratory Status 12/23/23 1200   NG/OG/NJ/NE External Measurement (cm) 60 cm 12/23/23 1200   Tube Feeding Diabetic 12/23/23 0000   Tube feeding/verify rate (mL/hr) 0 mL/hr 12/23/23 0800   Tube Feeding Intake (mL) 2245 ml 12/22/23 1812   Free Water/Flush (mL) 200 mL 12/23/23 0400   Residual Volume (ml) 0 ml 12/22/23 1621       Gastrostomy/Enterostomy/Jejunostomy Tube Gastrostomy LUQ 18 fr (Active)       Urinary Catheter 12/20/23 (Active)   $ Urethral catheter insertion $ Not inserted for procedure 12/20/23 0530   Catheter Indications Urinary retention (acute or chronic), continuous bladder irrigation or bladder outlet obstruction 12/23/23 1200   Site Assessment No urethral drainage 12/23/23 1200   Urine Color Yellow 12/23/23 0400   Urine Appearance Clear 12/23/23 0400   Collection Container Standard 12/23/23 0400   Securement Method Securing device (Describe) 12/23/23 0400   Catheter Care  Soap and water 12/22/23 2020   Catheter Best Practices  Drainage tube clipped to bed;Catheter secured to thigh;Tamper seal intact;Bag not on floor;Bag below bladder;Lack of dependent loop in

## 2023-12-23 NOTE — OP NOTE
Operative Note      Patient: Caio Arce  YOB: 1940  MRN: [id number]    Date of Procedure: 12/23/2023    Pre-Op Diagnosis Codes:     * Dysphagia, unspecified type [R13.10]    Post-Op Diagnosis: Same       Procedure(s):  EGD DIAGNOSTIC ONLY  GASTROSTOMY TUBE PLACEMENT    Surgeon(s):  Caden Weaver MD Raimonde, Anthony J, MD    Assistant:   First Assistant: Priya Roman RN  Resident: Gavin Good DO    Anesthesia: General    Estimated Blood Loss (mL): 15ml    Complications: None    Specimens:   * No specimens in log *    Implants:  * No implants in log *      Drains:   NG/OG/NJ/NE Tube Nasogastric Left nostril (Active)   Surrounding Skin Clean, dry & intact 12/23/23 1200   Securement device Bridle 12/23/23 1200   Status Continuous feeding 12/23/23 1200   Placement Verified External Catheter Length;Respiratory Status 12/23/23 1200   NG/OG/NJ/NE External Measurement (cm) 60 cm 12/23/23 1200   Tube Feeding Diabetic 12/23/23 0000   Tube feeding/verify rate (mL/hr) 0 mL/hr 12/23/23 0800   Tube Feeding Intake (mL) 2245 ml 12/22/23 1812   Free Water/Flush (mL) 200 mL 12/23/23 0400   Residual Volume (ml) 0 ml 12/22/23 1621       Gastrostomy/Enterostomy/Jejunostomy Tube Gastrostomy LUQ 18 fr (Active)       Urinary Catheter 12/20/23 (Active)   $ Urethral catheter insertion $ Not inserted for procedure 12/20/23 0530   Catheter Indications Urinary retention (acute or chronic), continuous bladder irrigation or bladder outlet obstruction 12/23/23 1200   Site Assessment No urethral drainage 12/23/23 1200   Urine Color Yellow 12/23/23 0400   Urine Appearance Clear 12/23/23 0400   Collection Container Standard 12/23/23 0400   Securement Method Securing device (Describe) 12/23/23 0400   Catheter Care  Soap and water 12/22/23 2020   Catheter Best Practices  Drainage tube clipped to bed;Catheter secured to thigh;Tamper seal intact;Bag not on floor;Bag below bladder;Lack of dependent loop in

## 2023-12-24 LAB
ANION GAP SERPL CALCULATED.3IONS-SCNC: 10 MMOL/L (ref 9–17)
BASOPHILS # BLD: 0.03 K/UL (ref 0–0.2)
BASOPHILS NFR BLD: 0 % (ref 0–2)
BUN SERPL-MCNC: 40 MG/DL (ref 8–23)
CALCIUM SERPL-MCNC: 8.1 MG/DL (ref 8.6–10.4)
CHLORIDE SERPL-SCNC: 111 MMOL/L (ref 98–107)
CO2 SERPL-SCNC: 22 MMOL/L (ref 20–31)
CREAT SERPL-MCNC: 0.7 MG/DL (ref 0.7–1.2)
EOSINOPHIL # BLD: 0.04 K/UL (ref 0–0.44)
EOSINOPHILS RELATIVE PERCENT: 0 % (ref 1–4)
ERYTHROCYTE [DISTWIDTH] IN BLOOD BY AUTOMATED COUNT: 14 % (ref 11.8–14.4)
FIO2: 45
GFR SERPL CREATININE-BSD FRML MDRD: >60 ML/MIN/1.73M2
GLUCOSE BLD-MCNC: 112 MG/DL (ref 75–110)
GLUCOSE BLD-MCNC: 138 MG/DL (ref 74–100)
GLUCOSE SERPL-MCNC: 123 MG/DL (ref 70–99)
HCT VFR BLD AUTO: 39.6 % (ref 40.7–50.3)
HGB BLD-MCNC: 12.3 G/DL (ref 13–17)
IMM GRANULOCYTES # BLD AUTO: 0.11 K/UL (ref 0–0.3)
IMM GRANULOCYTES NFR BLD: 1 %
INR PPP: 1.3
LYMPHOCYTES NFR BLD: 0.98 K/UL (ref 1.1–3.7)
LYMPHOCYTES RELATIVE PERCENT: 7 % (ref 24–43)
MCH RBC QN AUTO: 31.1 PG (ref 25.2–33.5)
MCHC RBC AUTO-ENTMCNC: 31.1 G/DL (ref 28.4–34.8)
MCV RBC AUTO: 100.3 FL (ref 82.6–102.9)
MONOCYTES NFR BLD: 0.55 K/UL (ref 0.1–1.2)
MONOCYTES NFR BLD: 4 % (ref 3–12)
MRSA, DNA, NASAL: NEGATIVE
NEGATIVE BASE EXCESS, ART: 0.3 MMOL/L (ref 0–2)
NEUTROPHILS NFR BLD: 87 % (ref 36–65)
NEUTS SEG NFR BLD: 11.57 K/UL (ref 1.5–8.1)
NRBC BLD-RTO: 0 PER 100 WBC
PATIENT TEMP: 37.9
PLATELET # BLD AUTO: ABNORMAL K/UL (ref 138–453)
PLATELET, FLUORESCENCE: 144 K/UL (ref 138–453)
PLATELETS.RETICULATED NFR BLD AUTO: 4.7 % (ref 1.1–10.3)
POC HCO3: 22.7 MMOL/L (ref 21–28)
POC O2 SATURATION: 98.6 % (ref 94–98)
POC PCO2 TEMP: 32.6 MM HG
POC PCO2: 31.4 MM HG (ref 35–48)
POC PH TEMP: 7.45
POC PH: 7.47 (ref 7.35–7.45)
POC PO2 TEMP: 113.3 MM HG
POC PO2: 107.7 MM HG (ref 83–108)
POTASSIUM SERPL-SCNC: 4.3 MMOL/L (ref 3.7–5.3)
PROTHROMBIN TIME: 15.9 SEC (ref 11.7–14.9)
RBC # BLD AUTO: 3.95 M/UL (ref 4.21–5.77)
SAMPLE SITE: ABNORMAL
SODIUM SERPL-SCNC: 143 MMOL/L (ref 135–144)
SPECIMEN DESCRIPTION: NORMAL
WBC OTHER # BLD: 13.3 K/UL (ref 3.5–11.3)

## 2023-12-24 PROCEDURE — 99233 SBSQ HOSP IP/OBS HIGH 50: CPT | Performed by: INTERNAL MEDICINE

## 2023-12-24 PROCEDURE — 2580000003 HC RX 258: Performed by: ANESTHESIOLOGY

## 2023-12-24 PROCEDURE — 94640 AIRWAY INHALATION TREATMENT: CPT

## 2023-12-24 PROCEDURE — 99291 CRITICAL CARE FIRST HOUR: CPT | Performed by: INTERNAL MEDICINE

## 2023-12-24 PROCEDURE — 82803 BLOOD GASES ANY COMBINATION: CPT

## 2023-12-24 PROCEDURE — A4216 STERILE WATER/SALINE, 10 ML: HCPCS | Performed by: STUDENT IN AN ORGANIZED HEALTH CARE EDUCATION/TRAINING PROGRAM

## 2023-12-24 PROCEDURE — 80048 BASIC METABOLIC PNL TOTAL CA: CPT

## 2023-12-24 PROCEDURE — 2580000003 HC RX 258: Performed by: STUDENT IN AN ORGANIZED HEALTH CARE EDUCATION/TRAINING PROGRAM

## 2023-12-24 PROCEDURE — 6370000000 HC RX 637 (ALT 250 FOR IP): Performed by: STUDENT IN AN ORGANIZED HEALTH CARE EDUCATION/TRAINING PROGRAM

## 2023-12-24 PROCEDURE — 94003 VENT MGMT INPAT SUBQ DAY: CPT

## 2023-12-24 PROCEDURE — 2500000003 HC RX 250 WO HCPCS: Performed by: STUDENT IN AN ORGANIZED HEALTH CARE EDUCATION/TRAINING PROGRAM

## 2023-12-24 PROCEDURE — 2000000000 HC ICU R&B

## 2023-12-24 PROCEDURE — 94761 N-INVAS EAR/PLS OXIMETRY MLT: CPT

## 2023-12-24 PROCEDURE — 82947 ASSAY GLUCOSE BLOOD QUANT: CPT

## 2023-12-24 PROCEDURE — 85025 COMPLETE CBC W/AUTO DIFF WBC: CPT

## 2023-12-24 PROCEDURE — 36600 WITHDRAWAL OF ARTERIAL BLOOD: CPT

## 2023-12-24 PROCEDURE — 85610 PROTHROMBIN TIME: CPT

## 2023-12-24 PROCEDURE — 6360000002 HC RX W HCPCS

## 2023-12-24 PROCEDURE — 99024 POSTOP FOLLOW-UP VISIT: CPT | Performed by: SURGERY

## 2023-12-24 PROCEDURE — 85055 RETICULATED PLATELET ASSAY: CPT

## 2023-12-24 PROCEDURE — 6360000002 HC RX W HCPCS: Performed by: STUDENT IN AN ORGANIZED HEALTH CARE EDUCATION/TRAINING PROGRAM

## 2023-12-24 PROCEDURE — 2700000000 HC OXYGEN THERAPY PER DAY

## 2023-12-24 PROCEDURE — 36415 COLL VENOUS BLD VENIPUNCTURE: CPT

## 2023-12-24 RX ORDER — FENTANYL CITRATE 50 UG/ML
25 INJECTION, SOLUTION INTRAMUSCULAR; INTRAVENOUS
Status: DISCONTINUED | OUTPATIENT
Start: 2023-12-24 | End: 2023-12-28

## 2023-12-24 RX ORDER — FENTANYL CITRATE 50 UG/ML
50 INJECTION, SOLUTION INTRAMUSCULAR; INTRAVENOUS ONCE
Status: COMPLETED | OUTPATIENT
Start: 2023-12-24 | End: 2023-12-24

## 2023-12-24 RX ADMIN — SODIUM CHLORIDE: 9 INJECTION, SOLUTION INTRAVENOUS at 02:26

## 2023-12-24 RX ADMIN — FENTANYL CITRATE 25 MCG: 50 INJECTION, SOLUTION INTRAMUSCULAR; INTRAVENOUS at 20:34

## 2023-12-24 RX ADMIN — DEXMEDETOMIDINE HYDROCHLORIDE 0.8 MCG/KG/HR: 400 INJECTION, SOLUTION INTRAVENOUS at 03:35

## 2023-12-24 RX ADMIN — IPRATROPIUM BROMIDE AND ALBUTEROL SULFATE 1 DOSE: 2.5; .5 SOLUTION RESPIRATORY (INHALATION) at 15:45

## 2023-12-24 RX ADMIN — SODIUM CHLORIDE, PRESERVATIVE FREE 20 MG: 5 INJECTION INTRAVENOUS at 09:00

## 2023-12-24 RX ADMIN — GUAIFENESIN 200 MG: 200 SOLUTION ORAL at 22:10

## 2023-12-24 RX ADMIN — IPRATROPIUM BROMIDE AND ALBUTEROL SULFATE 1 DOSE: 2.5; .5 SOLUTION RESPIRATORY (INHALATION) at 08:01

## 2023-12-24 RX ADMIN — FENTANYL CITRATE 25 MCG: 50 INJECTION, SOLUTION INTRAMUSCULAR; INTRAVENOUS at 16:22

## 2023-12-24 RX ADMIN — DEXMEDETOMIDINE HYDROCHLORIDE 0.3 MCG/KG/HR: 400 INJECTION, SOLUTION INTRAVENOUS at 14:13

## 2023-12-24 RX ADMIN — GUAIFENESIN 200 MG: 200 SOLUTION ORAL at 17:29

## 2023-12-24 RX ADMIN — SODIUM CHLORIDE, PRESERVATIVE FREE 20 MG: 5 INJECTION INTRAVENOUS at 22:06

## 2023-12-24 RX ADMIN — FENTANYL CITRATE 50 MCG: 50 INJECTION, SOLUTION INTRAMUSCULAR; INTRAVENOUS at 17:28

## 2023-12-24 RX ADMIN — IPRATROPIUM BROMIDE AND ALBUTEROL SULFATE 1 DOSE: 2.5; .5 SOLUTION RESPIRATORY (INHALATION) at 20:29

## 2023-12-24 RX ADMIN — Medication 6 MG: at 22:07

## 2023-12-24 RX ADMIN — SODIUM CHLORIDE, PRESERVATIVE FREE 10 ML: 5 INJECTION INTRAVENOUS at 09:00

## 2023-12-24 ASSESSMENT — PULMONARY FUNCTION TESTS
PIF_VALUE: 22
PIF_VALUE: 23
PIF_VALUE: 20
PIF_VALUE: 21
PIF_VALUE: 11
PIF_VALUE: 20

## 2023-12-25 LAB
ALLEN TEST: POSITIVE
ANION GAP SERPL CALCULATED.3IONS-SCNC: 8 MMOL/L (ref 9–17)
BASOPHILS # BLD: <0.03 K/UL (ref 0–0.2)
BASOPHILS NFR BLD: 0 % (ref 0–2)
BUN SERPL-MCNC: 30 MG/DL (ref 8–23)
CALCIUM SERPL-MCNC: 7.8 MG/DL (ref 8.6–10.4)
CHLORIDE SERPL-SCNC: 111 MMOL/L (ref 98–107)
CO2 SERPL-SCNC: 21 MMOL/L (ref 20–31)
CREAT SERPL-MCNC: 0.4 MG/DL (ref 0.7–1.2)
EOSINOPHIL # BLD: 0.08 K/UL (ref 0–0.44)
EOSINOPHILS RELATIVE PERCENT: 1 % (ref 1–4)
ERYTHROCYTE [DISTWIDTH] IN BLOOD BY AUTOMATED COUNT: 13.9 % (ref 11.8–14.4)
FIO2: 30
GFR SERPL CREATININE-BSD FRML MDRD: >60 ML/MIN/1.73M2
GLUCOSE BLD-MCNC: 122 MG/DL (ref 75–110)
GLUCOSE BLD-MCNC: 124 MG/DL (ref 74–100)
GLUCOSE BLD-MCNC: 81 MG/DL (ref 75–110)
GLUCOSE BLD-MCNC: 89 MG/DL (ref 75–110)
GLUCOSE SERPL-MCNC: 123 MG/DL (ref 70–99)
HCT VFR BLD AUTO: 37.2 % (ref 40.7–50.3)
HGB BLD-MCNC: 11.5 G/DL (ref 13–17)
IMM GRANULOCYTES # BLD AUTO: 0.12 K/UL (ref 0–0.3)
IMM GRANULOCYTES NFR BLD: 1 %
INR PPP: 1.3
LYMPHOCYTES NFR BLD: 0.85 K/UL (ref 1.1–3.7)
LYMPHOCYTES RELATIVE PERCENT: 9 % (ref 24–43)
MCH RBC QN AUTO: 31.9 PG (ref 25.2–33.5)
MCHC RBC AUTO-ENTMCNC: 30.9 G/DL (ref 28.4–34.8)
MCV RBC AUTO: 103.3 FL (ref 82.6–102.9)
MICROORGANISM SPEC CULT: NORMAL
MICROORGANISM/AGENT SPEC: NORMAL
MODE: ABNORMAL
MONOCYTES NFR BLD: 0.48 K/UL (ref 0.1–1.2)
MONOCYTES NFR BLD: 5 % (ref 3–12)
NEUTROPHILS NFR BLD: 84 % (ref 36–65)
NEUTS SEG NFR BLD: 7.8 K/UL (ref 1.5–8.1)
NRBC BLD-RTO: 0 PER 100 WBC
O2 DELIVERY DEVICE: ABNORMAL
PLATELET # BLD AUTO: 129 K/UL (ref 138–453)
PMV BLD AUTO: 11.7 FL (ref 8.1–13.5)
POC HCO3: 23.7 MMOL/L (ref 21–28)
POC O2 SATURATION: 97.5 % (ref 94–98)
POC PCO2: 34 MM HG (ref 35–48)
POC PH: 7.45 (ref 7.35–7.45)
POC PO2: 90.7 MM HG (ref 83–108)
POSITIVE BASE EXCESS, ART: 0.1 MMOL/L (ref 0–3)
POTASSIUM SERPL-SCNC: 3.7 MMOL/L (ref 3.7–5.3)
PROTHROMBIN TIME: 16.2 SEC (ref 11.7–14.9)
RBC # BLD AUTO: 3.6 M/UL (ref 4.21–5.77)
RBC # BLD: ABNORMAL 10*6/UL
SAMPLE SITE: ABNORMAL
SODIUM SERPL-SCNC: 140 MMOL/L (ref 135–144)
SPECIMEN DESCRIPTION: NORMAL
WBC OTHER # BLD: 9.4 K/UL (ref 3.5–11.3)

## 2023-12-25 PROCEDURE — 36600 WITHDRAWAL OF ARTERIAL BLOOD: CPT

## 2023-12-25 PROCEDURE — 2000000000 HC ICU R&B

## 2023-12-25 PROCEDURE — 82803 BLOOD GASES ANY COMBINATION: CPT

## 2023-12-25 PROCEDURE — 99291 CRITICAL CARE FIRST HOUR: CPT | Performed by: INTERNAL MEDICINE

## 2023-12-25 PROCEDURE — 6370000000 HC RX 637 (ALT 250 FOR IP): Performed by: STUDENT IN AN ORGANIZED HEALTH CARE EDUCATION/TRAINING PROGRAM

## 2023-12-25 PROCEDURE — 36415 COLL VENOUS BLD VENIPUNCTURE: CPT

## 2023-12-25 PROCEDURE — 2500000003 HC RX 250 WO HCPCS: Performed by: STUDENT IN AN ORGANIZED HEALTH CARE EDUCATION/TRAINING PROGRAM

## 2023-12-25 PROCEDURE — 82947 ASSAY GLUCOSE BLOOD QUANT: CPT

## 2023-12-25 PROCEDURE — 80048 BASIC METABOLIC PNL TOTAL CA: CPT

## 2023-12-25 PROCEDURE — 2700000000 HC OXYGEN THERAPY PER DAY

## 2023-12-25 PROCEDURE — 2580000003 HC RX 258: Performed by: ANESTHESIOLOGY

## 2023-12-25 PROCEDURE — 6370000000 HC RX 637 (ALT 250 FOR IP)

## 2023-12-25 PROCEDURE — 2580000003 HC RX 258: Performed by: STUDENT IN AN ORGANIZED HEALTH CARE EDUCATION/TRAINING PROGRAM

## 2023-12-25 PROCEDURE — 85025 COMPLETE CBC W/AUTO DIFF WBC: CPT

## 2023-12-25 PROCEDURE — 6360000002 HC RX W HCPCS: Performed by: HOSPITALIST

## 2023-12-25 PROCEDURE — 85610 PROTHROMBIN TIME: CPT

## 2023-12-25 PROCEDURE — 94640 AIRWAY INHALATION TREATMENT: CPT

## 2023-12-25 PROCEDURE — 94761 N-INVAS EAR/PLS OXIMETRY MLT: CPT

## 2023-12-25 PROCEDURE — 6370000000 HC RX 637 (ALT 250 FOR IP): Performed by: INTERNAL MEDICINE

## 2023-12-25 PROCEDURE — 94003 VENT MGMT INPAT SUBQ DAY: CPT

## 2023-12-25 PROCEDURE — 6360000002 HC RX W HCPCS

## 2023-12-25 PROCEDURE — 99233 SBSQ HOSP IP/OBS HIGH 50: CPT | Performed by: INTERNAL MEDICINE

## 2023-12-25 PROCEDURE — A4216 STERILE WATER/SALINE, 10 ML: HCPCS | Performed by: STUDENT IN AN ORGANIZED HEALTH CARE EDUCATION/TRAINING PROGRAM

## 2023-12-25 RX ORDER — IPRATROPIUM BROMIDE AND ALBUTEROL SULFATE 2.5; .5 MG/3ML; MG/3ML
1 SOLUTION RESPIRATORY (INHALATION)
Status: DISCONTINUED | OUTPATIENT
Start: 2023-12-25 | End: 2023-12-28

## 2023-12-25 RX ORDER — OXYCODONE HYDROCHLORIDE 5 MG/1
5 TABLET ORAL EVERY 6 HOURS PRN
Status: DISCONTINUED | OUTPATIENT
Start: 2023-12-25 | End: 2024-01-08 | Stop reason: HOSPADM

## 2023-12-25 RX ORDER — OXYCODONE HYDROCHLORIDE 5 MG/1
5 TABLET ORAL EVERY 4 HOURS PRN
Status: DISCONTINUED | OUTPATIENT
Start: 2023-12-25 | End: 2023-12-25

## 2023-12-25 RX ADMIN — SODIUM CHLORIDE, PRESERVATIVE FREE 20 MG: 5 INJECTION INTRAVENOUS at 08:58

## 2023-12-25 RX ADMIN — ACETAMINOPHEN 650 MG: 325 TABLET ORAL at 22:38

## 2023-12-25 RX ADMIN — SODIUM CHLORIDE: 9 INJECTION, SOLUTION INTRAVENOUS at 17:59

## 2023-12-25 RX ADMIN — GUAIFENESIN 200 MG: 200 SOLUTION ORAL at 22:43

## 2023-12-25 RX ADMIN — GUAIFENESIN 200 MG: 200 SOLUTION ORAL at 17:20

## 2023-12-25 RX ADMIN — FENTANYL CITRATE 25 MCG: 50 INJECTION, SOLUTION INTRAMUSCULAR; INTRAVENOUS at 17:18

## 2023-12-25 RX ADMIN — SODIUM CHLORIDE, PRESERVATIVE FREE 10 ML: 5 INJECTION INTRAVENOUS at 08:58

## 2023-12-25 RX ADMIN — FENTANYL CITRATE 25 MCG: 50 INJECTION, SOLUTION INTRAMUSCULAR; INTRAVENOUS at 21:16

## 2023-12-25 RX ADMIN — ENOXAPARIN SODIUM 70 MG: 100 INJECTION SUBCUTANEOUS at 11:00

## 2023-12-25 RX ADMIN — SODIUM CHLORIDE, PRESERVATIVE FREE 10 ML: 5 INJECTION INTRAVENOUS at 21:17

## 2023-12-25 RX ADMIN — ASPIRIN 81 MG: 81 TABLET, COATED ORAL at 08:58

## 2023-12-25 RX ADMIN — GUAIFENESIN 200 MG: 200 SOLUTION ORAL at 11:03

## 2023-12-25 RX ADMIN — DEXMEDETOMIDINE HYDROCHLORIDE 0.8 MCG/KG/HR: 400 INJECTION, SOLUTION INTRAVENOUS at 01:16

## 2023-12-25 RX ADMIN — FENTANYL CITRATE 25 MCG: 50 INJECTION, SOLUTION INTRAMUSCULAR; INTRAVENOUS at 14:36

## 2023-12-25 RX ADMIN — IPRATROPIUM BROMIDE AND ALBUTEROL SULFATE 1 DOSE: 2.5; .5 SOLUTION RESPIRATORY (INHALATION) at 07:38

## 2023-12-25 RX ADMIN — ATORVASTATIN CALCIUM 20 MG: 40 TABLET, FILM COATED ORAL at 08:58

## 2023-12-25 RX ADMIN — FENTANYL CITRATE 25 MCG: 50 INJECTION, SOLUTION INTRAMUSCULAR; INTRAVENOUS at 01:06

## 2023-12-25 RX ADMIN — SODIUM CHLORIDE, PRESERVATIVE FREE 10 ML: 5 INJECTION INTRAVENOUS at 08:59

## 2023-12-25 RX ADMIN — DEXMEDETOMIDINE HYDROCHLORIDE 0.8 MCG/KG/HR: 400 INJECTION, SOLUTION INTRAVENOUS at 09:09

## 2023-12-25 RX ADMIN — SODIUM CHLORIDE, PRESERVATIVE FREE 10 ML: 5 INJECTION INTRAVENOUS at 22:47

## 2023-12-25 RX ADMIN — METOPROLOL TARTRATE 12.5 MG: 25 TABLET ORAL at 21:19

## 2023-12-25 RX ADMIN — IPRATROPIUM BROMIDE AND ALBUTEROL SULFATE 1 DOSE: 2.5; .5 SOLUTION RESPIRATORY (INHALATION) at 14:09

## 2023-12-25 RX ADMIN — IPRATROPIUM BROMIDE AND ALBUTEROL SULFATE 1 DOSE: 2.5; .5 SOLUTION RESPIRATORY (INHALATION) at 19:52

## 2023-12-25 RX ADMIN — SODIUM CHLORIDE: 9 INJECTION, SOLUTION INTRAVENOUS at 05:19

## 2023-12-25 RX ADMIN — Medication 6 MG: at 21:25

## 2023-12-25 RX ADMIN — GUAIFENESIN 200 MG: 200 SOLUTION ORAL at 05:13

## 2023-12-25 RX ADMIN — SODIUM CHLORIDE, PRESERVATIVE FREE 20 MG: 5 INJECTION INTRAVENOUS at 21:18

## 2023-12-25 ASSESSMENT — PAIN SCALES - GENERAL
PAINLEVEL_OUTOF10: 5
PAINLEVEL_OUTOF10: 7
PAINLEVEL_OUTOF10: 4
PAINLEVEL_OUTOF10: 5
PAINLEVEL_OUTOF10: 5

## 2023-12-25 ASSESSMENT — PAIN DESCRIPTION - DESCRIPTORS
DESCRIPTORS: DISCOMFORT;SHARP

## 2023-12-25 ASSESSMENT — PAIN DESCRIPTION - LOCATION
LOCATION: ABDOMEN
LOCATION: BACK
LOCATION: BACK

## 2023-12-25 ASSESSMENT — PULMONARY FUNCTION TESTS
PIF_VALUE: 23
PIF_VALUE: 22
PIF_VALUE: 13
PIF_VALUE: 13

## 2023-12-25 NOTE — CODE DOCUMENTATION
Dr. Chapman and I had discussion with family and patient.  Patient was intubated on Precedex for sedation however he was able to shake his head yes and no to questions.  Patient on more than one occasion had expressed that he did not want to be resuscitated, did not want to be reintubated, did not want CPR, or defibrillation performed in the event he were to go into cardiac arrest.  Discussed the different code options with family and patient including full code, DNR CCA, DNR CC.  Family and patient expressed that they would like to move forward with DNR CCA with no intubation.  Patient's CODE STATUS was changed to DNR CCA without intubation.  DNR CCA paperwork was completed.

## 2023-12-25 NOTE — CARE COORDINATION
Patient's code status was changed to DNRCC-A.  Patient extubated.  Awaiting outcomes of extubation

## 2023-12-26 LAB
ANION GAP SERPL CALCULATED.3IONS-SCNC: 10 MMOL/L (ref 9–17)
BASOPHILS # BLD: <0.03 K/UL (ref 0–0.2)
BASOPHILS NFR BLD: 0 % (ref 0–2)
BUN SERPL-MCNC: 27 MG/DL (ref 8–23)
CALCIUM SERPL-MCNC: 8.6 MG/DL (ref 8.6–10.4)
CHLORIDE SERPL-SCNC: 114 MMOL/L (ref 98–107)
CO2 SERPL-SCNC: 21 MMOL/L (ref 20–31)
CREAT SERPL-MCNC: 0.5 MG/DL (ref 0.7–1.2)
EOSINOPHIL # BLD: <0.03 K/UL (ref 0–0.44)
EOSINOPHILS RELATIVE PERCENT: 0 % (ref 1–4)
ERYTHROCYTE [DISTWIDTH] IN BLOOD BY AUTOMATED COUNT: 13.9 % (ref 11.8–14.4)
GFR SERPL CREATININE-BSD FRML MDRD: >60 ML/MIN/1.73M2
GLUCOSE BLD-MCNC: 116 MG/DL (ref 75–110)
GLUCOSE BLD-MCNC: 84 MG/DL (ref 75–110)
GLUCOSE BLD-MCNC: 92 MG/DL (ref 75–110)
GLUCOSE BLD-MCNC: 98 MG/DL (ref 75–110)
GLUCOSE SERPL-MCNC: 96 MG/DL (ref 70–99)
HCT VFR BLD AUTO: 41.5 % (ref 40.7–50.3)
HGB BLD-MCNC: 13.4 G/DL (ref 13–17)
IMM GRANULOCYTES # BLD AUTO: 0.12 K/UL (ref 0–0.3)
IMM GRANULOCYTES NFR BLD: 1 %
INR PPP: 1.2
LYMPHOCYTES NFR BLD: 0.76 K/UL (ref 1.1–3.7)
LYMPHOCYTES RELATIVE PERCENT: 7 % (ref 24–43)
MCH RBC QN AUTO: 31.2 PG (ref 25.2–33.5)
MCHC RBC AUTO-ENTMCNC: 32.3 G/DL (ref 28.4–34.8)
MCV RBC AUTO: 96.7 FL (ref 82.6–102.9)
MONOCYTES NFR BLD: 0.61 K/UL (ref 0.1–1.2)
MONOCYTES NFR BLD: 6 % (ref 3–12)
NEUTROPHILS NFR BLD: 86 % (ref 36–65)
NEUTS SEG NFR BLD: 9.25 K/UL (ref 1.5–8.1)
NRBC BLD-RTO: 0 PER 100 WBC
PLATELET # BLD AUTO: ABNORMAL K/UL (ref 138–453)
PLATELET, FLUORESCENCE: 142 K/UL (ref 138–453)
PLATELETS.RETICULATED NFR BLD AUTO: 2.6 % (ref 1.1–10.3)
POTASSIUM SERPL-SCNC: 4.1 MMOL/L (ref 3.7–5.3)
PROTHROMBIN TIME: 15.4 SEC (ref 11.7–14.9)
RBC # BLD AUTO: 4.29 M/UL (ref 4.21–5.77)
SODIUM SERPL-SCNC: 145 MMOL/L (ref 135–144)
WBC OTHER # BLD: 10.8 K/UL (ref 3.5–11.3)

## 2023-12-26 PROCEDURE — 2500000003 HC RX 250 WO HCPCS: Performed by: STUDENT IN AN ORGANIZED HEALTH CARE EDUCATION/TRAINING PROGRAM

## 2023-12-26 PROCEDURE — 6360000002 HC RX W HCPCS: Performed by: HOSPITALIST

## 2023-12-26 PROCEDURE — 99291 CRITICAL CARE FIRST HOUR: CPT | Performed by: INTERNAL MEDICINE

## 2023-12-26 PROCEDURE — 6370000000 HC RX 637 (ALT 250 FOR IP): Performed by: STUDENT IN AN ORGANIZED HEALTH CARE EDUCATION/TRAINING PROGRAM

## 2023-12-26 PROCEDURE — 94761 N-INVAS EAR/PLS OXIMETRY MLT: CPT

## 2023-12-26 PROCEDURE — 2580000003 HC RX 258: Performed by: STUDENT IN AN ORGANIZED HEALTH CARE EDUCATION/TRAINING PROGRAM

## 2023-12-26 PROCEDURE — A4216 STERILE WATER/SALINE, 10 ML: HCPCS | Performed by: STUDENT IN AN ORGANIZED HEALTH CARE EDUCATION/TRAINING PROGRAM

## 2023-12-26 PROCEDURE — 85055 RETICULATED PLATELET ASSAY: CPT

## 2023-12-26 PROCEDURE — 6370000000 HC RX 637 (ALT 250 FOR IP): Performed by: INTERNAL MEDICINE

## 2023-12-26 PROCEDURE — 85610 PROTHROMBIN TIME: CPT

## 2023-12-26 PROCEDURE — 2580000003 HC RX 258: Performed by: ANESTHESIOLOGY

## 2023-12-26 PROCEDURE — 6370000000 HC RX 637 (ALT 250 FOR IP)

## 2023-12-26 PROCEDURE — 6360000002 HC RX W HCPCS

## 2023-12-26 PROCEDURE — 36415 COLL VENOUS BLD VENIPUNCTURE: CPT

## 2023-12-26 PROCEDURE — 94640 AIRWAY INHALATION TREATMENT: CPT

## 2023-12-26 PROCEDURE — 2700000000 HC OXYGEN THERAPY PER DAY

## 2023-12-26 PROCEDURE — 85025 COMPLETE CBC W/AUTO DIFF WBC: CPT

## 2023-12-26 PROCEDURE — 2000000000 HC ICU R&B

## 2023-12-26 PROCEDURE — 80048 BASIC METABOLIC PNL TOTAL CA: CPT

## 2023-12-26 PROCEDURE — 82947 ASSAY GLUCOSE BLOOD QUANT: CPT

## 2023-12-26 RX ADMIN — METOPROLOL TARTRATE 12.5 MG: 25 TABLET ORAL at 21:18

## 2023-12-26 RX ADMIN — IPRATROPIUM BROMIDE AND ALBUTEROL SULFATE 1 DOSE: 2.5; .5 SOLUTION RESPIRATORY (INHALATION) at 08:33

## 2023-12-26 RX ADMIN — GUAIFENESIN 200 MG: 200 SOLUTION ORAL at 23:33

## 2023-12-26 RX ADMIN — IPRATROPIUM BROMIDE AND ALBUTEROL SULFATE 1 DOSE: 2.5; .5 SOLUTION RESPIRATORY (INHALATION) at 19:38

## 2023-12-26 RX ADMIN — ENOXAPARIN SODIUM 70 MG: 100 INJECTION SUBCUTANEOUS at 21:16

## 2023-12-26 RX ADMIN — ASPIRIN 81 MG: 81 TABLET, COATED ORAL at 09:47

## 2023-12-26 RX ADMIN — SODIUM CHLORIDE, PRESERVATIVE FREE 20 MG: 5 INJECTION INTRAVENOUS at 09:42

## 2023-12-26 RX ADMIN — METOPROLOL TARTRATE 12.5 MG: 25 TABLET ORAL at 09:43

## 2023-12-26 RX ADMIN — ENOXAPARIN SODIUM 70 MG: 100 INJECTION SUBCUTANEOUS at 09:43

## 2023-12-26 RX ADMIN — SODIUM CHLORIDE: 9 INJECTION, SOLUTION INTRAVENOUS at 18:31

## 2023-12-26 RX ADMIN — GUAIFENESIN 200 MG: 200 SOLUTION ORAL at 04:12

## 2023-12-26 RX ADMIN — OXYCODONE HYDROCHLORIDE 5 MG: 5 TABLET ORAL at 14:55

## 2023-12-26 RX ADMIN — SODIUM CHLORIDE, PRESERVATIVE FREE 10 ML: 5 INJECTION INTRAVENOUS at 09:43

## 2023-12-26 RX ADMIN — SODIUM CHLORIDE: 9 INJECTION, SOLUTION INTRAVENOUS at 05:01

## 2023-12-26 RX ADMIN — Medication 6 MG: at 21:17

## 2023-12-26 RX ADMIN — GUAIFENESIN 200 MG: 200 SOLUTION ORAL at 11:52

## 2023-12-26 RX ADMIN — SODIUM CHLORIDE, PRESERVATIVE FREE 10 ML: 5 INJECTION INTRAVENOUS at 21:18

## 2023-12-26 RX ADMIN — GUAIFENESIN 200 MG: 200 SOLUTION ORAL at 18:16

## 2023-12-26 RX ADMIN — ATORVASTATIN CALCIUM 20 MG: 40 TABLET, FILM COATED ORAL at 09:43

## 2023-12-26 RX ADMIN — FENTANYL CITRATE 25 MCG: 50 INJECTION, SOLUTION INTRAMUSCULAR; INTRAVENOUS at 05:25

## 2023-12-26 RX ADMIN — ENOXAPARIN SODIUM 70 MG: 100 INJECTION SUBCUTANEOUS at 04:12

## 2023-12-26 RX ADMIN — OXYCODONE HYDROCHLORIDE 5 MG: 5 TABLET ORAL at 00:23

## 2023-12-26 RX ADMIN — SODIUM CHLORIDE, PRESERVATIVE FREE 20 MG: 5 INJECTION INTRAVENOUS at 21:17

## 2023-12-26 ASSESSMENT — PAIN SCALES - GENERAL
PAINLEVEL_OUTOF10: 5
PAINLEVEL_OUTOF10: 0
PAINLEVEL_OUTOF10: 7

## 2023-12-26 ASSESSMENT — PAIN DESCRIPTION - LOCATION
LOCATION: NECK;BACK
LOCATION: COCCYX

## 2023-12-27 ENCOUNTER — APPOINTMENT (OUTPATIENT)
Dept: GENERAL RADIOLOGY | Age: 83
DRG: 208 | End: 2023-12-27
Payer: MEDICARE

## 2023-12-27 LAB
ANION GAP SERPL CALCULATED.3IONS-SCNC: 9 MMOL/L (ref 9–17)
BASOPHILS # BLD: 0.03 K/UL (ref 0–0.2)
BASOPHILS NFR BLD: 0 % (ref 0–2)
BUN SERPL-MCNC: 21 MG/DL (ref 8–23)
CALCIUM SERPL-MCNC: 8.1 MG/DL (ref 8.6–10.4)
CHLORIDE SERPL-SCNC: 108 MMOL/L (ref 98–107)
CO2 SERPL-SCNC: 24 MMOL/L (ref 20–31)
CREAT SERPL-MCNC: 0.5 MG/DL (ref 0.7–1.2)
EKG ATRIAL RATE: 340 BPM
EKG P AXIS: -86 DEGREES
EKG Q-T INTERVAL: 476 MS
EKG QRS DURATION: 136 MS
EKG QTC CALCULATION (BAZETT): 506 MS
EKG R AXIS: -75 DEGREES
EKG T AXIS: 145 DEGREES
EKG VENTRICULAR RATE: 68 BPM
EOSINOPHIL # BLD: 0.06 K/UL (ref 0–0.44)
EOSINOPHILS RELATIVE PERCENT: 1 % (ref 1–4)
ERYTHROCYTE [DISTWIDTH] IN BLOOD BY AUTOMATED COUNT: 13.9 % (ref 11.8–14.4)
GFR SERPL CREATININE-BSD FRML MDRD: >60 ML/MIN/1.73M2
GLUCOSE BLD-MCNC: 119 MG/DL (ref 75–110)
GLUCOSE BLD-MCNC: 125 MG/DL (ref 75–110)
GLUCOSE BLD-MCNC: 135 MG/DL (ref 75–110)
GLUCOSE BLD-MCNC: 143 MG/DL (ref 75–110)
GLUCOSE SERPL-MCNC: 134 MG/DL (ref 70–99)
HCT VFR BLD AUTO: 40.8 % (ref 40.7–50.3)
HGB BLD-MCNC: 12.5 G/DL (ref 13–17)
IMM GRANULOCYTES # BLD AUTO: 0.1 K/UL (ref 0–0.3)
IMM GRANULOCYTES NFR BLD: 1 %
INR PPP: 1.3
LYMPHOCYTES NFR BLD: 0.99 K/UL (ref 1.1–3.7)
LYMPHOCYTES RELATIVE PERCENT: 9 % (ref 24–43)
MAGNESIUM SERPL-MCNC: 2.2 MG/DL (ref 1.6–2.6)
MCH RBC QN AUTO: 30.8 PG (ref 25.2–33.5)
MCHC RBC AUTO-ENTMCNC: 30.6 G/DL (ref 28.4–34.8)
MCV RBC AUTO: 100.5 FL (ref 82.6–102.9)
MONOCYTES NFR BLD: 0.56 K/UL (ref 0.1–1.2)
MONOCYTES NFR BLD: 5 % (ref 3–12)
NEUTROPHILS NFR BLD: 84 % (ref 36–65)
NEUTS SEG NFR BLD: 8.87 K/UL (ref 1.5–8.1)
NRBC BLD-RTO: 0 PER 100 WBC
PLATELET # BLD AUTO: 151 K/UL (ref 138–453)
PMV BLD AUTO: 10.7 FL (ref 8.1–13.5)
POTASSIUM SERPL-SCNC: 3.5 MMOL/L (ref 3.7–5.3)
PROTHROMBIN TIME: 16.2 SEC (ref 11.7–14.9)
RBC # BLD AUTO: 4.06 M/UL (ref 4.21–5.77)
SODIUM SERPL-SCNC: 141 MMOL/L (ref 135–144)
WBC OTHER # BLD: 10.6 K/UL (ref 3.5–11.3)

## 2023-12-27 PROCEDURE — 85610 PROTHROMBIN TIME: CPT

## 2023-12-27 PROCEDURE — 94640 AIRWAY INHALATION TREATMENT: CPT

## 2023-12-27 PROCEDURE — 2060000000 HC ICU INTERMEDIATE R&B

## 2023-12-27 PROCEDURE — 82947 ASSAY GLUCOSE BLOOD QUANT: CPT

## 2023-12-27 PROCEDURE — A4216 STERILE WATER/SALINE, 10 ML: HCPCS | Performed by: STUDENT IN AN ORGANIZED HEALTH CARE EDUCATION/TRAINING PROGRAM

## 2023-12-27 PROCEDURE — 6370000000 HC RX 637 (ALT 250 FOR IP): Performed by: STUDENT IN AN ORGANIZED HEALTH CARE EDUCATION/TRAINING PROGRAM

## 2023-12-27 PROCEDURE — 85025 COMPLETE CBC W/AUTO DIFF WBC: CPT

## 2023-12-27 PROCEDURE — 2500000003 HC RX 250 WO HCPCS: Performed by: STUDENT IN AN ORGANIZED HEALTH CARE EDUCATION/TRAINING PROGRAM

## 2023-12-27 PROCEDURE — 97110 THERAPEUTIC EXERCISES: CPT

## 2023-12-27 PROCEDURE — 99233 SBSQ HOSP IP/OBS HIGH 50: CPT | Performed by: INTERNAL MEDICINE

## 2023-12-27 PROCEDURE — 31720 CLEARANCE OF AIRWAYS: CPT

## 2023-12-27 PROCEDURE — 36415 COLL VENOUS BLD VENIPUNCTURE: CPT

## 2023-12-27 PROCEDURE — 80048 BASIC METABOLIC PNL TOTAL CA: CPT

## 2023-12-27 PROCEDURE — 6360000002 HC RX W HCPCS: Performed by: HOSPITALIST

## 2023-12-27 PROCEDURE — 6370000000 HC RX 637 (ALT 250 FOR IP)

## 2023-12-27 PROCEDURE — 83735 ASSAY OF MAGNESIUM: CPT

## 2023-12-27 PROCEDURE — 2580000003 HC RX 258: Performed by: STUDENT IN AN ORGANIZED HEALTH CARE EDUCATION/TRAINING PROGRAM

## 2023-12-27 PROCEDURE — 97530 THERAPEUTIC ACTIVITIES: CPT

## 2023-12-27 PROCEDURE — 71045 X-RAY EXAM CHEST 1 VIEW: CPT

## 2023-12-27 PROCEDURE — 99291 CRITICAL CARE FIRST HOUR: CPT | Performed by: INTERNAL MEDICINE

## 2023-12-27 PROCEDURE — 6370000000 HC RX 637 (ALT 250 FOR IP): Performed by: INTERNAL MEDICINE

## 2023-12-27 PROCEDURE — 2500000003 HC RX 250 WO HCPCS

## 2023-12-27 RX ORDER — GLYCOPYRROLATE 0.2 MG/ML
0.2 INJECTION INTRAMUSCULAR; INTRAVENOUS EVERY 8 HOURS PRN
Status: DISCONTINUED | OUTPATIENT
Start: 2023-12-27 | End: 2024-01-08 | Stop reason: HOSPADM

## 2023-12-27 RX ADMIN — SODIUM CHLORIDE, PRESERVATIVE FREE 10 ML: 5 INJECTION INTRAVENOUS at 08:04

## 2023-12-27 RX ADMIN — METOPROLOL TARTRATE 12.5 MG: 25 TABLET ORAL at 21:42

## 2023-12-27 RX ADMIN — GUAIFENESIN 200 MG: 200 SOLUTION ORAL at 11:13

## 2023-12-27 RX ADMIN — IPRATROPIUM BROMIDE AND ALBUTEROL SULFATE 1 DOSE: 2.5; .5 SOLUTION RESPIRATORY (INHALATION) at 08:53

## 2023-12-27 RX ADMIN — ATORVASTATIN CALCIUM 20 MG: 40 TABLET, FILM COATED ORAL at 08:16

## 2023-12-27 RX ADMIN — POTASSIUM BICARBONATE 40 MEQ: 782 TABLET, EFFERVESCENT ORAL at 16:31

## 2023-12-27 RX ADMIN — GUAIFENESIN 200 MG: 200 SOLUTION ORAL at 23:30

## 2023-12-27 RX ADMIN — GUAIFENESIN 200 MG: 200 SOLUTION ORAL at 05:36

## 2023-12-27 RX ADMIN — GUAIFENESIN 200 MG: 200 SOLUTION ORAL at 16:36

## 2023-12-27 RX ADMIN — ENOXAPARIN SODIUM 70 MG: 100 INJECTION SUBCUTANEOUS at 08:06

## 2023-12-27 RX ADMIN — METOPROLOL TARTRATE 5 MG: 5 INJECTION INTRAVENOUS at 01:07

## 2023-12-27 RX ADMIN — IPRATROPIUM BROMIDE AND ALBUTEROL SULFATE 1 DOSE: 2.5; .5 SOLUTION RESPIRATORY (INHALATION) at 21:30

## 2023-12-27 RX ADMIN — GLYCOPYRROLATE 0.2 MG: 0.2 INJECTION INTRAMUSCULAR; INTRAVENOUS at 16:43

## 2023-12-27 RX ADMIN — Medication 6 MG: at 21:42

## 2023-12-27 RX ADMIN — SODIUM CHLORIDE, PRESERVATIVE FREE 20 MG: 5 INJECTION INTRAVENOUS at 23:30

## 2023-12-27 RX ADMIN — ACETAMINOPHEN 650 MG: 325 TABLET ORAL at 02:12

## 2023-12-27 RX ADMIN — ASPIRIN 81 MG: 81 TABLET, COATED ORAL at 08:16

## 2023-12-27 RX ADMIN — METOPROLOL TARTRATE 12.5 MG: 25 TABLET ORAL at 08:15

## 2023-12-27 RX ADMIN — ENOXAPARIN SODIUM 70 MG: 100 INJECTION SUBCUTANEOUS at 21:42

## 2023-12-27 RX ADMIN — SODIUM CHLORIDE, PRESERVATIVE FREE 20 MG: 5 INJECTION INTRAVENOUS at 08:16

## 2023-12-27 RX ADMIN — SODIUM CHLORIDE: 9 INJECTION, SOLUTION INTRAVENOUS at 08:01

## 2023-12-28 LAB
ANION GAP SERPL CALCULATED.3IONS-SCNC: 9 MMOL/L (ref 9–17)
BASOPHILS # BLD: 0.03 K/UL (ref 0–0.2)
BASOPHILS NFR BLD: 0 % (ref 0–2)
BUN SERPL-MCNC: 23 MG/DL (ref 8–23)
CALCIUM SERPL-MCNC: 8 MG/DL (ref 8.6–10.4)
CHLORIDE SERPL-SCNC: 109 MMOL/L (ref 98–107)
CO2 SERPL-SCNC: 23 MMOL/L (ref 20–31)
CREAT SERPL-MCNC: 0.5 MG/DL (ref 0.7–1.2)
EOSINOPHIL # BLD: 0.12 K/UL (ref 0–0.44)
EOSINOPHILS RELATIVE PERCENT: 1 % (ref 1–4)
ERYTHROCYTE [DISTWIDTH] IN BLOOD BY AUTOMATED COUNT: 14.2 % (ref 11.8–14.4)
GFR SERPL CREATININE-BSD FRML MDRD: >60 ML/MIN/1.73M2
GLUCOSE BLD-MCNC: 107 MG/DL (ref 75–110)
GLUCOSE BLD-MCNC: 116 MG/DL (ref 75–110)
GLUCOSE BLD-MCNC: 119 MG/DL (ref 75–110)
GLUCOSE BLD-MCNC: 123 MG/DL (ref 75–110)
GLUCOSE BLD-MCNC: 127 MG/DL (ref 75–110)
GLUCOSE SERPL-MCNC: 121 MG/DL (ref 70–99)
HCT VFR BLD AUTO: 37.3 % (ref 40.7–50.3)
HGB BLD-MCNC: 11.5 G/DL (ref 13–17)
IMM GRANULOCYTES # BLD AUTO: 0.13 K/UL (ref 0–0.3)
IMM GRANULOCYTES NFR BLD: 1 %
INR PPP: 1.2
LYMPHOCYTES NFR BLD: 1.22 K/UL (ref 1.1–3.7)
LYMPHOCYTES RELATIVE PERCENT: 11 % (ref 24–43)
MCH RBC QN AUTO: 31.2 PG (ref 25.2–33.5)
MCHC RBC AUTO-ENTMCNC: 30.8 G/DL (ref 28.4–34.8)
MCV RBC AUTO: 101.1 FL (ref 82.6–102.9)
MONOCYTES NFR BLD: 0.49 K/UL (ref 0.1–1.2)
MONOCYTES NFR BLD: 4 % (ref 3–12)
NEUTROPHILS NFR BLD: 83 % (ref 36–65)
NEUTS SEG NFR BLD: 9.14 K/UL (ref 1.5–8.1)
NRBC BLD-RTO: 0 PER 100 WBC
PLATELET # BLD AUTO: 129 K/UL (ref 138–453)
PMV BLD AUTO: 10.4 FL (ref 8.1–13.5)
POTASSIUM SERPL-SCNC: 4.2 MMOL/L (ref 3.7–5.3)
PROTHROMBIN TIME: 14.7 SEC (ref 11.7–14.9)
RBC # BLD AUTO: 3.69 M/UL (ref 4.21–5.77)
SODIUM SERPL-SCNC: 141 MMOL/L (ref 135–144)
WBC OTHER # BLD: 11.1 K/UL (ref 3.5–11.3)

## 2023-12-28 PROCEDURE — 2060000000 HC ICU INTERMEDIATE R&B

## 2023-12-28 PROCEDURE — 31720 CLEARANCE OF AIRWAYS: CPT

## 2023-12-28 PROCEDURE — 94640 AIRWAY INHALATION TREATMENT: CPT

## 2023-12-28 PROCEDURE — 99213 OFFICE O/P EST LOW 20 MIN: CPT

## 2023-12-28 PROCEDURE — 6370000000 HC RX 637 (ALT 250 FOR IP): Performed by: STUDENT IN AN ORGANIZED HEALTH CARE EDUCATION/TRAINING PROGRAM

## 2023-12-28 PROCEDURE — 82947 ASSAY GLUCOSE BLOOD QUANT: CPT

## 2023-12-28 PROCEDURE — 94761 N-INVAS EAR/PLS OXIMETRY MLT: CPT

## 2023-12-28 PROCEDURE — 80048 BASIC METABOLIC PNL TOTAL CA: CPT

## 2023-12-28 PROCEDURE — 85610 PROTHROMBIN TIME: CPT

## 2023-12-28 PROCEDURE — 2580000003 HC RX 258: Performed by: STUDENT IN AN ORGANIZED HEALTH CARE EDUCATION/TRAINING PROGRAM

## 2023-12-28 PROCEDURE — 99232 SBSQ HOSP IP/OBS MODERATE 35: CPT | Performed by: STUDENT IN AN ORGANIZED HEALTH CARE EDUCATION/TRAINING PROGRAM

## 2023-12-28 PROCEDURE — 92526 ORAL FUNCTION THERAPY: CPT

## 2023-12-28 PROCEDURE — 6370000000 HC RX 637 (ALT 250 FOR IP)

## 2023-12-28 PROCEDURE — 6360000002 HC RX W HCPCS: Performed by: HOSPITALIST

## 2023-12-28 PROCEDURE — 36415 COLL VENOUS BLD VENIPUNCTURE: CPT

## 2023-12-28 PROCEDURE — 99233 SBSQ HOSP IP/OBS HIGH 50: CPT | Performed by: INTERNAL MEDICINE

## 2023-12-28 PROCEDURE — 2500000003 HC RX 250 WO HCPCS: Performed by: STUDENT IN AN ORGANIZED HEALTH CARE EDUCATION/TRAINING PROGRAM

## 2023-12-28 PROCEDURE — 2700000000 HC OXYGEN THERAPY PER DAY

## 2023-12-28 PROCEDURE — 85025 COMPLETE CBC W/AUTO DIFF WBC: CPT

## 2023-12-28 RX ORDER — WARFARIN SODIUM 5 MG/1
5 TABLET ORAL
Status: COMPLETED | OUTPATIENT
Start: 2023-12-28 | End: 2023-12-28

## 2023-12-28 RX ORDER — PANTOPRAZOLE SODIUM 40 MG/1
40 TABLET, DELAYED RELEASE ORAL
Status: DISCONTINUED | OUTPATIENT
Start: 2023-12-28 | End: 2023-12-28

## 2023-12-28 RX ORDER — QUETIAPINE FUMARATE 25 MG/1
25 TABLET, FILM COATED ORAL NIGHTLY
Status: DISCONTINUED | OUTPATIENT
Start: 2023-12-28 | End: 2023-12-30

## 2023-12-28 RX ORDER — MORPHINE SULFATE 2 MG/ML
2 INJECTION, SOLUTION INTRAMUSCULAR; INTRAVENOUS EVERY 4 HOURS PRN
Status: DISCONTINUED | OUTPATIENT
Start: 2023-12-28 | End: 2024-01-08 | Stop reason: HOSPADM

## 2023-12-28 RX ORDER — LANSOPRAZOLE 30 MG/1
30 TABLET, ORALLY DISINTEGRATING, DELAYED RELEASE ORAL
Status: DISCONTINUED | OUTPATIENT
Start: 2023-12-29 | End: 2024-01-08 | Stop reason: HOSPADM

## 2023-12-28 RX ORDER — IPRATROPIUM BROMIDE AND ALBUTEROL SULFATE 2.5; .5 MG/3ML; MG/3ML
1 SOLUTION RESPIRATORY (INHALATION) EVERY 4 HOURS PRN
Status: DISCONTINUED | OUTPATIENT
Start: 2023-12-28 | End: 2024-01-08 | Stop reason: HOSPADM

## 2023-12-28 RX ADMIN — WARFARIN SODIUM 5 MG: 5 TABLET ORAL at 18:01

## 2023-12-28 RX ADMIN — ATORVASTATIN CALCIUM 20 MG: 40 TABLET, FILM COATED ORAL at 08:05

## 2023-12-28 RX ADMIN — IPRATROPIUM BROMIDE AND ALBUTEROL SULFATE 1 DOSE: 2.5; .5 SOLUTION RESPIRATORY (INHALATION) at 15:03

## 2023-12-28 RX ADMIN — ASPIRIN 81 MG: 81 TABLET, COATED ORAL at 08:05

## 2023-12-28 RX ADMIN — PANTOPRAZOLE SODIUM 40 MG: 40 TABLET, DELAYED RELEASE ORAL at 08:05

## 2023-12-28 RX ADMIN — GUAIFENESIN 200 MG: 200 SOLUTION ORAL at 18:01

## 2023-12-28 RX ADMIN — SODIUM CHLORIDE, PRESERVATIVE FREE 10 ML: 5 INJECTION INTRAVENOUS at 23:27

## 2023-12-28 RX ADMIN — ENOXAPARIN SODIUM 70 MG: 100 INJECTION SUBCUTANEOUS at 08:06

## 2023-12-28 RX ADMIN — METOPROLOL TARTRATE 12.5 MG: 25 TABLET ORAL at 08:06

## 2023-12-28 RX ADMIN — ENOXAPARIN SODIUM 70 MG: 100 INJECTION SUBCUTANEOUS at 23:26

## 2023-12-28 RX ADMIN — QUETIAPINE FUMARATE 25 MG: 25 TABLET ORAL at 23:26

## 2023-12-28 RX ADMIN — METOPROLOL TARTRATE 12.5 MG: 25 TABLET ORAL at 23:28

## 2023-12-28 RX ADMIN — GUAIFENESIN 200 MG: 200 SOLUTION ORAL at 23:26

## 2023-12-28 RX ADMIN — GUAIFENESIN 200 MG: 200 SOLUTION ORAL at 11:31

## 2023-12-29 ENCOUNTER — APPOINTMENT (OUTPATIENT)
Dept: GENERAL RADIOLOGY | Age: 83
DRG: 208 | End: 2023-12-29
Payer: MEDICARE

## 2023-12-29 LAB
ANION GAP SERPL CALCULATED.3IONS-SCNC: 9 MMOL/L (ref 9–17)
BASOPHILS # BLD: 0.03 K/UL (ref 0–0.2)
BASOPHILS NFR BLD: 0 % (ref 0–2)
BUN SERPL-MCNC: 20 MG/DL (ref 8–23)
CALCIUM SERPL-MCNC: 8.1 MG/DL (ref 8.6–10.4)
CHLORIDE SERPL-SCNC: 103 MMOL/L (ref 98–107)
CO2 SERPL-SCNC: 25 MMOL/L (ref 20–31)
CREAT SERPL-MCNC: 0.5 MG/DL (ref 0.7–1.2)
EOSINOPHIL # BLD: 0.13 K/UL (ref 0–0.44)
EOSINOPHILS RELATIVE PERCENT: 1 % (ref 1–4)
ERYTHROCYTE [DISTWIDTH] IN BLOOD BY AUTOMATED COUNT: 14.4 % (ref 11.8–14.4)
GFR SERPL CREATININE-BSD FRML MDRD: >60 ML/MIN/1.73M2
GLUCOSE BLD-MCNC: 107 MG/DL (ref 75–110)
GLUCOSE BLD-MCNC: 109 MG/DL (ref 75–110)
GLUCOSE BLD-MCNC: 116 MG/DL (ref 75–110)
GLUCOSE BLD-MCNC: 93 MG/DL (ref 75–110)
GLUCOSE BLD-MCNC: 99 MG/DL (ref 75–110)
GLUCOSE SERPL-MCNC: 106 MG/DL (ref 70–99)
HCT VFR BLD AUTO: 38.7 % (ref 40.7–50.3)
HGB BLD-MCNC: 12.1 G/DL (ref 13–17)
IMM GRANULOCYTES # BLD AUTO: 0.11 K/UL (ref 0–0.3)
IMM GRANULOCYTES NFR BLD: 1 %
INR PPP: 1.1
LYMPHOCYTES NFR BLD: 1.43 K/UL (ref 1.1–3.7)
LYMPHOCYTES RELATIVE PERCENT: 15 % (ref 24–43)
MCH RBC QN AUTO: 30.8 PG (ref 25.2–33.5)
MCHC RBC AUTO-ENTMCNC: 31.3 G/DL (ref 28.4–34.8)
MCV RBC AUTO: 98.5 FL (ref 82.6–102.9)
MONOCYTES NFR BLD: 0.38 K/UL (ref 0.1–1.2)
MONOCYTES NFR BLD: 4 % (ref 3–12)
NEUTROPHILS NFR BLD: 78 % (ref 36–65)
NEUTS SEG NFR BLD: 7.27 K/UL (ref 1.5–8.1)
NRBC BLD-RTO: 0 PER 100 WBC
PLATELET # BLD AUTO: ABNORMAL K/UL (ref 138–453)
PLATELET, FLUORESCENCE: 140 K/UL (ref 138–453)
PLATELETS.RETICULATED NFR BLD AUTO: 3 % (ref 1.1–10.3)
POTASSIUM SERPL-SCNC: 4.1 MMOL/L (ref 3.7–5.3)
PROTHROMBIN TIME: 13.7 SEC (ref 11.7–14.9)
RBC # BLD AUTO: 3.93 M/UL (ref 4.21–5.77)
SODIUM SERPL-SCNC: 137 MMOL/L (ref 135–144)
WBC OTHER # BLD: 9.4 K/UL (ref 3.5–11.3)

## 2023-12-29 PROCEDURE — 2580000003 HC RX 258: Performed by: ANESTHESIOLOGY

## 2023-12-29 PROCEDURE — 94640 AIRWAY INHALATION TREATMENT: CPT

## 2023-12-29 PROCEDURE — 6370000000 HC RX 637 (ALT 250 FOR IP)

## 2023-12-29 PROCEDURE — 2500000003 HC RX 250 WO HCPCS: Performed by: STUDENT IN AN ORGANIZED HEALTH CARE EDUCATION/TRAINING PROGRAM

## 2023-12-29 PROCEDURE — 6370000000 HC RX 637 (ALT 250 FOR IP): Performed by: STUDENT IN AN ORGANIZED HEALTH CARE EDUCATION/TRAINING PROGRAM

## 2023-12-29 PROCEDURE — 97530 THERAPEUTIC ACTIVITIES: CPT

## 2023-12-29 PROCEDURE — 82947 ASSAY GLUCOSE BLOOD QUANT: CPT

## 2023-12-29 PROCEDURE — 80048 BASIC METABOLIC PNL TOTAL CA: CPT

## 2023-12-29 PROCEDURE — 6360000002 HC RX W HCPCS: Performed by: STUDENT IN AN ORGANIZED HEALTH CARE EDUCATION/TRAINING PROGRAM

## 2023-12-29 PROCEDURE — 2580000003 HC RX 258: Performed by: STUDENT IN AN ORGANIZED HEALTH CARE EDUCATION/TRAINING PROGRAM

## 2023-12-29 PROCEDURE — 36415 COLL VENOUS BLD VENIPUNCTURE: CPT

## 2023-12-29 PROCEDURE — 31720 CLEARANCE OF AIRWAYS: CPT

## 2023-12-29 PROCEDURE — 2060000000 HC ICU INTERMEDIATE R&B

## 2023-12-29 PROCEDURE — 97110 THERAPEUTIC EXERCISES: CPT

## 2023-12-29 PROCEDURE — 71045 X-RAY EXAM CHEST 1 VIEW: CPT

## 2023-12-29 PROCEDURE — 85055 RETICULATED PLATELET ASSAY: CPT

## 2023-12-29 PROCEDURE — 99232 SBSQ HOSP IP/OBS MODERATE 35: CPT | Performed by: INTERNAL MEDICINE

## 2023-12-29 PROCEDURE — 85025 COMPLETE CBC W/AUTO DIFF WBC: CPT

## 2023-12-29 PROCEDURE — 99232 SBSQ HOSP IP/OBS MODERATE 35: CPT | Performed by: STUDENT IN AN ORGANIZED HEALTH CARE EDUCATION/TRAINING PROGRAM

## 2023-12-29 PROCEDURE — 6360000002 HC RX W HCPCS: Performed by: HOSPITALIST

## 2023-12-29 PROCEDURE — 85610 PROTHROMBIN TIME: CPT

## 2023-12-29 PROCEDURE — 94761 N-INVAS EAR/PLS OXIMETRY MLT: CPT

## 2023-12-29 RX ORDER — ENOXAPARIN SODIUM 100 MG/ML
1 INJECTION SUBCUTANEOUS 2 TIMES DAILY
Qty: 8 ML | Refills: 0 | Status: SHIPPED | OUTPATIENT
Start: 2023-12-29 | End: 2023-12-31 | Stop reason: HOSPADM

## 2023-12-29 RX ORDER — WARFARIN SODIUM 7.5 MG/1
7.5 TABLET ORAL
Status: COMPLETED | OUTPATIENT
Start: 2023-12-29 | End: 2023-12-29

## 2023-12-29 RX ORDER — ECHINACEA PURPUREA EXTRACT 125 MG
1 TABLET ORAL PRN
Qty: 1 EACH | Refills: 3 | Status: SHIPPED | OUTPATIENT
Start: 2023-12-29

## 2023-12-29 RX ORDER — ALBUTEROL SULFATE 2.5 MG/3ML
2.5 SOLUTION RESPIRATORY (INHALATION) EVERY 4 HOURS PRN
Qty: 120 EACH | Refills: 3 | Status: SHIPPED | OUTPATIENT
Start: 2023-12-29

## 2023-12-29 RX ORDER — LANSOPRAZOLE 30 MG/1
30 TABLET, ORALLY DISINTEGRATING, DELAYED RELEASE ORAL
Qty: 30 TABLET | Refills: 0 | Status: SHIPPED | OUTPATIENT
Start: 2023-12-30 | End: 2024-01-29

## 2023-12-29 RX ORDER — IPRATROPIUM BROMIDE AND ALBUTEROL SULFATE 2.5; .5 MG/3ML; MG/3ML
3 SOLUTION RESPIRATORY (INHALATION) EVERY 4 HOURS PRN
Qty: 360 ML | Refills: 1 | Status: SHIPPED | OUTPATIENT
Start: 2023-12-29

## 2023-12-29 RX ORDER — FLUTICASONE PROPIONATE 50 MCG
2 SPRAY, SUSPENSION (ML) NASAL DAILY
Status: DISCONTINUED | OUTPATIENT
Start: 2023-12-29 | End: 2024-01-08 | Stop reason: HOSPADM

## 2023-12-29 RX ORDER — GUAIFENESIN 200 MG/10ML
200 LIQUID ORAL EVERY 6 HOURS
Qty: 280 ML | Refills: 0 | Status: SHIPPED | OUTPATIENT
Start: 2023-12-29 | End: 2024-01-05

## 2023-12-29 RX ORDER — AMOXICILLIN 250 MG
2 CAPSULE ORAL DAILY
Qty: 60 TABLET | Refills: 0 | Status: SHIPPED | OUTPATIENT
Start: 2023-12-29 | End: 2024-01-28

## 2023-12-29 RX ORDER — ECHINACEA PURPUREA EXTRACT 125 MG
1 TABLET ORAL PRN
Status: DISCONTINUED | OUTPATIENT
Start: 2023-12-29 | End: 2024-01-08 | Stop reason: HOSPADM

## 2023-12-29 RX ORDER — QUETIAPINE FUMARATE 25 MG/1
25 TABLET, FILM COATED ORAL NIGHTLY
Qty: 2 TABLET | Refills: 0 | Status: SHIPPED | OUTPATIENT
Start: 2023-12-29 | End: 2023-12-31

## 2023-12-29 RX ORDER — FLUTICASONE PROPIONATE 50 MCG
2 SPRAY, SUSPENSION (ML) NASAL DAILY
Qty: 16 G | Refills: 3 | Status: SHIPPED | OUTPATIENT
Start: 2023-12-30

## 2023-12-29 RX ORDER — HYDROCODONE BITARTRATE AND ACETAMINOPHEN 5; 325 MG/1; MG/1
1 TABLET ORAL EVERY 6 HOURS PRN
Qty: 10 TABLET | Refills: 0 | Status: SHIPPED | OUTPATIENT
Start: 2023-12-29 | End: 2024-01-05

## 2023-12-29 RX ADMIN — ATORVASTATIN CALCIUM 20 MG: 40 TABLET, FILM COATED ORAL at 09:04

## 2023-12-29 RX ADMIN — LANSOPRAZOLE 30 MG: 30 TABLET, ORALLY DISINTEGRATING, DELAYED RELEASE ORAL at 08:54

## 2023-12-29 RX ADMIN — METOPROLOL TARTRATE 12.5 MG: 25 TABLET ORAL at 09:04

## 2023-12-29 RX ADMIN — ENOXAPARIN SODIUM 70 MG: 100 INJECTION SUBCUTANEOUS at 21:42

## 2023-12-29 RX ADMIN — ASPIRIN 81 MG: 81 TABLET, COATED ORAL at 09:04

## 2023-12-29 RX ADMIN — ALBUTEROL SULFATE 2.5 MG: 2.5 SOLUTION RESPIRATORY (INHALATION) at 13:42

## 2023-12-29 RX ADMIN — WARFARIN SODIUM 7.5 MG: 7.5 TABLET ORAL at 17:42

## 2023-12-29 RX ADMIN — GUAIFENESIN 200 MG: 200 SOLUTION ORAL at 11:18

## 2023-12-29 RX ADMIN — METOPROLOL TARTRATE 12.5 MG: 25 TABLET ORAL at 21:42

## 2023-12-29 RX ADMIN — ENOXAPARIN SODIUM 70 MG: 100 INJECTION SUBCUTANEOUS at 08:54

## 2023-12-29 RX ADMIN — QUETIAPINE FUMARATE 25 MG: 25 TABLET ORAL at 21:42

## 2023-12-29 RX ADMIN — ALBUTEROL SULFATE 2.5 MG: 2.5 SOLUTION RESPIRATORY (INHALATION) at 23:29

## 2023-12-29 RX ADMIN — ACETAMINOPHEN 650 MG: 325 TABLET ORAL at 21:58

## 2023-12-29 RX ADMIN — GUAIFENESIN 200 MG: 200 SOLUTION ORAL at 17:42

## 2023-12-29 RX ADMIN — SODIUM CHLORIDE, PRESERVATIVE FREE 10 ML: 5 INJECTION INTRAVENOUS at 08:55

## 2023-12-29 RX ADMIN — SODIUM CHLORIDE, PRESERVATIVE FREE 10 ML: 5 INJECTION INTRAVENOUS at 21:43

## 2023-12-29 RX ADMIN — GUAIFENESIN 200 MG: 200 SOLUTION ORAL at 21:42

## 2023-12-29 RX ADMIN — FLUTICASONE PROPIONATE 2 SPRAY: 50 SPRAY, METERED NASAL at 09:22

## 2023-12-29 ASSESSMENT — PAIN SCALES - GENERAL
PAINLEVEL_OUTOF10: 0
PAINLEVEL_OUTOF10: 2
PAINLEVEL_OUTOF10: 0
PAINLEVEL_OUTOF10: 0

## 2023-12-29 NOTE — DISCHARGE INSTR - COC
of right middle lobe (HCC) J69.0    Bandemia D72.825    Acute respiratory failure with hypoxia (HCC) J96.01    Metabolic encephalopathy G93.41    Supratherapeutic INR R79.1    Acute metabolic encephalopathy G93.41    Elevated C-reactive protein (CRP) R79.82    Acute pain of right knee M25.561    Oropharyngeal dysphagia R13.12       Isolation/Infection:   Isolation            No Isolation          Patient Infection Status       Infection Onset Added Last Indicated Last Indicated By Review Planned Expiration Resolved Resolved By    None active    Resolved    COVID-19 12/10/23 12/10/23 12/10/23 Respiratory Panel, Molecular, with COVID-19 (Restricted: peds pts or suitable admitted adults)   12/20/23 Jluis Lima, RN                       Nurse Assessment:  Last Vital Signs: /73   Pulse 80   Temp 97.7 °F (36.5 °C) (Oral)   Resp 26   Ht 1.778 m (5' 10\")   Wt 71.5 kg (157 lb 10.1 oz)   SpO2 91%   BMI 22.62 kg/m²     Last documented pain score (0-10 scale): Pain Level: 0  Last Weight:   Wt Readings from Last 1 Encounters:   12/27/23 71.5 kg (157 lb 10.1 oz)     Mental Status:  oriented    IV Access:  - None    Nursing Mobility/ADLs:  Walking   Assisted  Transfer  Assisted  Bathing  Assisted  Dressing  Assisted  Toileting  Assisted  Feeding  Independent  Med Admin  Independent  Med Delivery   none    Wound Care Documentation and Therapy:  Wound 12/19/23 Coccyx (Active)   Wound Image   12/28/23 1155   Wound Etiology Deep tissue/Injury 12/29/23 1200   Dressing Status Clean;Dry;Intact 12/29/23 1200   Wound Cleansed Not Cleansed 12/29/23 1200   Dressing/Treatment Triad hydro/zinc oxide-based hydrophilic paste 12/29/23 1200   Dressing Change Due 12/28/23 12/28/23 1155   Wound Length (cm) 3.5 cm 12/28/23 1155   Wound Width (cm) 4 cm 12/28/23 1155   Wound Depth (cm) 0.1 cm 12/28/23 1155   Wound Surface Area (cm^2) 14 cm^2 12/28/23 1155   Wound Volume (cm^3) 1.4 cm^3 12/28/23 1155   Wound Assessment

## 2023-12-29 NOTE — CARE COORDINATION
Met with patient's wife to discuss transitional planning. She relates that plan is for patient to go to SNF at discharge. List provided     5601 patient's wife, Caroline, updated that patient has a discharge order. Requested SNF choices asap    1544 received choice of [facility] from Caroline. Referral sent    1733 voicemail left for [facility] intake to follow up on referral

## 2023-12-30 LAB
BASOPHILS # BLD: 0.03 K/UL (ref 0–0.2)
BASOPHILS NFR BLD: 0 % (ref 0–2)
EOSINOPHIL # BLD: 0.16 K/UL (ref 0–0.44)
EOSINOPHILS RELATIVE PERCENT: 2 % (ref 1–4)
ERYTHROCYTE [DISTWIDTH] IN BLOOD BY AUTOMATED COUNT: 14.5 % (ref 11.8–14.4)
GLUCOSE BLD-MCNC: 100 MG/DL (ref 75–110)
GLUCOSE BLD-MCNC: 92 MG/DL (ref 75–110)
GLUCOSE BLD-MCNC: 93 MG/DL (ref 75–110)
GLUCOSE BLD-MCNC: 96 MG/DL (ref 75–110)
GLUCOSE BLD-MCNC: 99 MG/DL (ref 75–110)
HCT VFR BLD AUTO: 36.8 % (ref 40.7–50.3)
HGB BLD-MCNC: 11.9 G/DL (ref 13–17)
IMM GRANULOCYTES # BLD AUTO: 0.1 K/UL (ref 0–0.3)
IMM GRANULOCYTES NFR BLD: 1 %
INR PPP: 1.3
LYMPHOCYTES NFR BLD: 1.63 K/UL (ref 1.1–3.7)
LYMPHOCYTES RELATIVE PERCENT: 18 % (ref 24–43)
MCH RBC QN AUTO: 31 PG (ref 25.2–33.5)
MCHC RBC AUTO-ENTMCNC: 32.3 G/DL (ref 28.4–34.8)
MCV RBC AUTO: 95.8 FL (ref 82.6–102.9)
MONOCYTES NFR BLD: 0.38 K/UL (ref 0.1–1.2)
MONOCYTES NFR BLD: 4 % (ref 3–12)
NEUTROPHILS NFR BLD: 74 % (ref 36–65)
NEUTS SEG NFR BLD: 6.66 K/UL (ref 1.5–8.1)
NRBC BLD-RTO: 0 PER 100 WBC
PLATELET # BLD AUTO: ABNORMAL K/UL (ref 138–453)
PLATELET, FLUORESCENCE: 120 K/UL (ref 138–453)
PLATELETS.RETICULATED NFR BLD AUTO: 3.7 % (ref 1.1–10.3)
PROTHROMBIN TIME: 15.5 SEC (ref 11.7–14.9)
RBC # BLD AUTO: 3.84 M/UL (ref 4.21–5.77)
RBC # BLD: ABNORMAL 10*6/UL
WBC OTHER # BLD: 9 K/UL (ref 3.5–11.3)

## 2023-12-30 PROCEDURE — 6370000000 HC RX 637 (ALT 250 FOR IP): Performed by: STUDENT IN AN ORGANIZED HEALTH CARE EDUCATION/TRAINING PROGRAM

## 2023-12-30 PROCEDURE — 97530 THERAPEUTIC ACTIVITIES: CPT

## 2023-12-30 PROCEDURE — 2500000003 HC RX 250 WO HCPCS: Performed by: STUDENT IN AN ORGANIZED HEALTH CARE EDUCATION/TRAINING PROGRAM

## 2023-12-30 PROCEDURE — 36415 COLL VENOUS BLD VENIPUNCTURE: CPT

## 2023-12-30 PROCEDURE — 2060000000 HC ICU INTERMEDIATE R&B

## 2023-12-30 PROCEDURE — 99232 SBSQ HOSP IP/OBS MODERATE 35: CPT | Performed by: INTERNAL MEDICINE

## 2023-12-30 PROCEDURE — 97110 THERAPEUTIC EXERCISES: CPT

## 2023-12-30 PROCEDURE — 6370000000 HC RX 637 (ALT 250 FOR IP)

## 2023-12-30 PROCEDURE — 85610 PROTHROMBIN TIME: CPT

## 2023-12-30 PROCEDURE — 2580000003 HC RX 258: Performed by: ANESTHESIOLOGY

## 2023-12-30 PROCEDURE — 85055 RETICULATED PLATELET ASSAY: CPT

## 2023-12-30 PROCEDURE — 6360000002 HC RX W HCPCS: Performed by: HOSPITALIST

## 2023-12-30 PROCEDURE — 2580000003 HC RX 258: Performed by: STUDENT IN AN ORGANIZED HEALTH CARE EDUCATION/TRAINING PROGRAM

## 2023-12-30 PROCEDURE — 82947 ASSAY GLUCOSE BLOOD QUANT: CPT

## 2023-12-30 PROCEDURE — 99232 SBSQ HOSP IP/OBS MODERATE 35: CPT | Performed by: STUDENT IN AN ORGANIZED HEALTH CARE EDUCATION/TRAINING PROGRAM

## 2023-12-30 PROCEDURE — 85025 COMPLETE CBC W/AUTO DIFF WBC: CPT

## 2023-12-30 PROCEDURE — 97535 SELF CARE MNGMENT TRAINING: CPT

## 2023-12-30 RX ORDER — QUETIAPINE FUMARATE 25 MG/1
50 TABLET, FILM COATED ORAL NIGHTLY
Status: COMPLETED | OUTPATIENT
Start: 2023-12-30 | End: 2024-01-02

## 2023-12-30 RX ORDER — WARFARIN SODIUM 7.5 MG/1
7.5 TABLET ORAL
Status: COMPLETED | OUTPATIENT
Start: 2023-12-30 | End: 2023-12-30

## 2023-12-30 RX ADMIN — SODIUM CHLORIDE, PRESERVATIVE FREE 10 ML: 5 INJECTION INTRAVENOUS at 20:32

## 2023-12-30 RX ADMIN — WARFARIN SODIUM 7.5 MG: 7.5 TABLET ORAL at 20:32

## 2023-12-30 RX ADMIN — SODIUM CHLORIDE, PRESERVATIVE FREE 10 ML: 5 INJECTION INTRAVENOUS at 08:31

## 2023-12-30 RX ADMIN — ATORVASTATIN CALCIUM 20 MG: 40 TABLET, FILM COATED ORAL at 08:30

## 2023-12-30 RX ADMIN — FLUTICASONE PROPIONATE 2 SPRAY: 50 SPRAY, METERED NASAL at 08:32

## 2023-12-30 RX ADMIN — ENOXAPARIN SODIUM 70 MG: 100 INJECTION SUBCUTANEOUS at 08:29

## 2023-12-30 RX ADMIN — GUAIFENESIN 200 MG: 200 SOLUTION ORAL at 23:21

## 2023-12-30 RX ADMIN — METOPROLOL TARTRATE 12.5 MG: 25 TABLET ORAL at 08:29

## 2023-12-30 RX ADMIN — GUAIFENESIN 200 MG: 200 SOLUTION ORAL at 05:31

## 2023-12-30 RX ADMIN — METOPROLOL TARTRATE 12.5 MG: 25 TABLET ORAL at 20:33

## 2023-12-30 RX ADMIN — ASPIRIN 81 MG: 81 TABLET, COATED ORAL at 08:30

## 2023-12-30 RX ADMIN — GUAIFENESIN 200 MG: 200 SOLUTION ORAL at 12:29

## 2023-12-30 RX ADMIN — LANSOPRAZOLE 30 MG: 30 TABLET, ORALLY DISINTEGRATING, DELAYED RELEASE ORAL at 05:51

## 2023-12-30 RX ADMIN — GUAIFENESIN 200 MG: 200 SOLUTION ORAL at 18:49

## 2023-12-30 RX ADMIN — QUETIAPINE FUMARATE 50 MG: 25 TABLET ORAL at 20:34

## 2023-12-30 RX ADMIN — ENOXAPARIN SODIUM 70 MG: 100 INJECTION SUBCUTANEOUS at 20:33

## 2023-12-31 LAB
ANION GAP SERPL CALCULATED.3IONS-SCNC: 8 MMOL/L (ref 9–17)
BASOPHILS # BLD: 0.06 K/UL (ref 0–0.2)
BASOPHILS NFR BLD: 1 % (ref 0–2)
BUN SERPL-MCNC: 18 MG/DL (ref 8–23)
CALCIUM SERPL-MCNC: 8.5 MG/DL (ref 8.6–10.4)
CHLORIDE SERPL-SCNC: 103 MMOL/L (ref 98–107)
CO2 SERPL-SCNC: 24 MMOL/L (ref 20–31)
CREAT SERPL-MCNC: 0.4 MG/DL (ref 0.7–1.2)
EOSINOPHIL # BLD: 0.12 K/UL (ref 0–0.44)
EOSINOPHILS RELATIVE PERCENT: 1 % (ref 1–4)
ERYTHROCYTE [DISTWIDTH] IN BLOOD BY AUTOMATED COUNT: 14.5 % (ref 11.8–14.4)
GFR SERPL CREATININE-BSD FRML MDRD: >60 ML/MIN/1.73M2
GLUCOSE BLD-MCNC: 108 MG/DL (ref 75–110)
GLUCOSE BLD-MCNC: 109 MG/DL (ref 75–110)
GLUCOSE BLD-MCNC: 95 MG/DL (ref 75–110)
GLUCOSE SERPL-MCNC: 112 MG/DL (ref 70–99)
HCT VFR BLD AUTO: 40.8 % (ref 40.7–50.3)
HGB BLD-MCNC: 12 G/DL (ref 13–17)
IMM GRANULOCYTES # BLD AUTO: 0.11 K/UL (ref 0–0.3)
IMM GRANULOCYTES NFR BLD: 1 %
INR PPP: 1.9
LYMPHOCYTES NFR BLD: 0.92 K/UL (ref 1.1–3.7)
LYMPHOCYTES RELATIVE PERCENT: 9 % (ref 24–43)
MCH RBC QN AUTO: 31.1 PG (ref 25.2–33.5)
MCHC RBC AUTO-ENTMCNC: 29.4 G/DL (ref 28.4–34.8)
MCV RBC AUTO: 105.7 FL (ref 82.6–102.9)
MONOCYTES NFR BLD: 0.4 K/UL (ref 0.1–1.2)
MONOCYTES NFR BLD: 4 % (ref 3–12)
NEUTROPHILS NFR BLD: 84 % (ref 36–65)
NEUTS SEG NFR BLD: 8.18 K/UL (ref 1.5–8.1)
NRBC BLD-RTO: 0 PER 100 WBC
PLATELET # BLD AUTO: 151 K/UL (ref 138–453)
PMV BLD AUTO: 10.7 FL (ref 8.1–13.5)
POTASSIUM SERPL-SCNC: 4.5 MMOL/L (ref 3.7–5.3)
PROTHROMBIN TIME: 21.7 SEC (ref 11.7–14.9)
RBC # BLD AUTO: 3.86 M/UL (ref 4.21–5.77)
RBC # BLD: ABNORMAL 10*6/UL
RBC # BLD: ABNORMAL 10*6/UL
SODIUM SERPL-SCNC: 135 MMOL/L (ref 135–144)
WBC OTHER # BLD: 9.8 K/UL (ref 3.5–11.3)

## 2023-12-31 PROCEDURE — 85025 COMPLETE CBC W/AUTO DIFF WBC: CPT

## 2023-12-31 PROCEDURE — 2500000003 HC RX 250 WO HCPCS: Performed by: STUDENT IN AN ORGANIZED HEALTH CARE EDUCATION/TRAINING PROGRAM

## 2023-12-31 PROCEDURE — 97535 SELF CARE MNGMENT TRAINING: CPT

## 2023-12-31 PROCEDURE — 36415 COLL VENOUS BLD VENIPUNCTURE: CPT

## 2023-12-31 PROCEDURE — 6370000000 HC RX 637 (ALT 250 FOR IP): Performed by: STUDENT IN AN ORGANIZED HEALTH CARE EDUCATION/TRAINING PROGRAM

## 2023-12-31 PROCEDURE — 97110 THERAPEUTIC EXERCISES: CPT

## 2023-12-31 PROCEDURE — 97530 THERAPEUTIC ACTIVITIES: CPT

## 2023-12-31 PROCEDURE — 6360000002 HC RX W HCPCS: Performed by: STUDENT IN AN ORGANIZED HEALTH CARE EDUCATION/TRAINING PROGRAM

## 2023-12-31 PROCEDURE — 85610 PROTHROMBIN TIME: CPT

## 2023-12-31 PROCEDURE — 82947 ASSAY GLUCOSE BLOOD QUANT: CPT

## 2023-12-31 PROCEDURE — 80048 BASIC METABOLIC PNL TOTAL CA: CPT

## 2023-12-31 PROCEDURE — 2700000000 HC OXYGEN THERAPY PER DAY

## 2023-12-31 PROCEDURE — 2580000003 HC RX 258: Performed by: STUDENT IN AN ORGANIZED HEALTH CARE EDUCATION/TRAINING PROGRAM

## 2023-12-31 PROCEDURE — 2580000003 HC RX 258: Performed by: ANESTHESIOLOGY

## 2023-12-31 PROCEDURE — 94761 N-INVAS EAR/PLS OXIMETRY MLT: CPT

## 2023-12-31 PROCEDURE — 2060000000 HC ICU INTERMEDIATE R&B

## 2023-12-31 PROCEDURE — 6360000002 HC RX W HCPCS: Performed by: HOSPITALIST

## 2023-12-31 PROCEDURE — 94640 AIRWAY INHALATION TREATMENT: CPT

## 2023-12-31 PROCEDURE — 6370000000 HC RX 637 (ALT 250 FOR IP)

## 2023-12-31 PROCEDURE — 99232 SBSQ HOSP IP/OBS MODERATE 35: CPT | Performed by: STUDENT IN AN ORGANIZED HEALTH CARE EDUCATION/TRAINING PROGRAM

## 2023-12-31 RX ORDER — WARFARIN SODIUM 7.5 MG/1
7.5 TABLET ORAL
Status: COMPLETED | OUTPATIENT
Start: 2023-12-31 | End: 2023-12-31

## 2023-12-31 RX ADMIN — GUAIFENESIN 200 MG: 200 SOLUTION ORAL at 03:36

## 2023-12-31 RX ADMIN — ALBUTEROL SULFATE 2.5 MG: 2.5 SOLUTION RESPIRATORY (INHALATION) at 00:35

## 2023-12-31 RX ADMIN — GUAIFENESIN 200 MG: 200 SOLUTION ORAL at 17:14

## 2023-12-31 RX ADMIN — WARFARIN SODIUM 7.5 MG: 7.5 TABLET ORAL at 17:14

## 2023-12-31 RX ADMIN — ASPIRIN 81 MG: 81 TABLET, COATED ORAL at 08:46

## 2023-12-31 RX ADMIN — SODIUM CHLORIDE, PRESERVATIVE FREE 10 ML: 5 INJECTION INTRAVENOUS at 21:18

## 2023-12-31 RX ADMIN — QUETIAPINE FUMARATE 50 MG: 25 TABLET ORAL at 21:22

## 2023-12-31 RX ADMIN — METOPROLOL TARTRATE 12.5 MG: 25 TABLET ORAL at 21:22

## 2023-12-31 RX ADMIN — GUAIFENESIN 200 MG: 200 SOLUTION ORAL at 21:22

## 2023-12-31 RX ADMIN — LANSOPRAZOLE 30 MG: 30 TABLET, ORALLY DISINTEGRATING, DELAYED RELEASE ORAL at 08:46

## 2023-12-31 RX ADMIN — SODIUM CHLORIDE, PRESERVATIVE FREE 10 ML: 5 INJECTION INTRAVENOUS at 08:47

## 2023-12-31 RX ADMIN — METOPROLOL TARTRATE 12.5 MG: 25 TABLET ORAL at 08:46

## 2023-12-31 RX ADMIN — ATORVASTATIN CALCIUM 20 MG: 40 TABLET, FILM COATED ORAL at 08:46

## 2023-12-31 RX ADMIN — GUAIFENESIN 200 MG: 200 SOLUTION ORAL at 12:11

## 2023-12-31 RX ADMIN — FLUTICASONE PROPIONATE 2 SPRAY: 50 SPRAY, METERED NASAL at 08:46

## 2023-12-31 RX ADMIN — ENOXAPARIN SODIUM 70 MG: 100 INJECTION SUBCUTANEOUS at 08:46

## 2023-12-31 RX ADMIN — METOPROLOL TARTRATE 5 MG: 5 INJECTION INTRAVENOUS at 03:34

## 2023-12-31 NOTE — CARE COORDINATION
Transitional planning  Call placed to [facility], spoke with Lainey (states she does not have the referral).  Re e-faxed referral.  Lainey inquired on insurance, states she will review and get back with writer.  Writer requesting if accepting to start the pre cert.  Writer states the office will be closed until Tuesday.  Await return call.    1108 Lainey called back accepting patient.  Unable to start precert until Tuesday.

## 2024-01-01 LAB
ANION GAP SERPL CALCULATED.3IONS-SCNC: 7 MMOL/L (ref 9–17)
BUN SERPL-MCNC: 21 MG/DL (ref 8–23)
CALCIUM SERPL-MCNC: 8.6 MG/DL (ref 8.6–10.4)
CHLORIDE SERPL-SCNC: 102 MMOL/L (ref 98–107)
CO2 SERPL-SCNC: 27 MMOL/L (ref 20–31)
CREAT SERPL-MCNC: 0.5 MG/DL (ref 0.7–1.2)
GFR SERPL CREATININE-BSD FRML MDRD: >60 ML/MIN/1.73M2
GLUCOSE BLD-MCNC: 100 MG/DL (ref 75–110)
GLUCOSE BLD-MCNC: 100 MG/DL (ref 75–110)
GLUCOSE BLD-MCNC: 103 MG/DL (ref 75–110)
GLUCOSE BLD-MCNC: 104 MG/DL (ref 75–110)
GLUCOSE BLD-MCNC: 85 MG/DL (ref 75–110)
GLUCOSE SERPL-MCNC: 113 MG/DL (ref 70–99)
INR PPP: 2.7
POTASSIUM SERPL-SCNC: 4.8 MMOL/L (ref 3.7–5.3)
PROTHROMBIN TIME: 28.1 SEC (ref 11.7–14.9)
SODIUM SERPL-SCNC: 136 MMOL/L (ref 135–144)

## 2024-01-01 PROCEDURE — 6370000000 HC RX 637 (ALT 250 FOR IP): Performed by: STUDENT IN AN ORGANIZED HEALTH CARE EDUCATION/TRAINING PROGRAM

## 2024-01-01 PROCEDURE — 2580000003 HC RX 258: Performed by: STUDENT IN AN ORGANIZED HEALTH CARE EDUCATION/TRAINING PROGRAM

## 2024-01-01 PROCEDURE — 94761 N-INVAS EAR/PLS OXIMETRY MLT: CPT

## 2024-01-01 PROCEDURE — 82947 ASSAY GLUCOSE BLOOD QUANT: CPT

## 2024-01-01 PROCEDURE — 6360000002 HC RX W HCPCS: Performed by: STUDENT IN AN ORGANIZED HEALTH CARE EDUCATION/TRAINING PROGRAM

## 2024-01-01 PROCEDURE — 36415 COLL VENOUS BLD VENIPUNCTURE: CPT

## 2024-01-01 PROCEDURE — 2500000003 HC RX 250 WO HCPCS: Performed by: STUDENT IN AN ORGANIZED HEALTH CARE EDUCATION/TRAINING PROGRAM

## 2024-01-01 PROCEDURE — 85610 PROTHROMBIN TIME: CPT

## 2024-01-01 PROCEDURE — 6370000000 HC RX 637 (ALT 250 FOR IP)

## 2024-01-01 PROCEDURE — 2060000000 HC ICU INTERMEDIATE R&B

## 2024-01-01 PROCEDURE — 2580000003 HC RX 258: Performed by: ANESTHESIOLOGY

## 2024-01-01 PROCEDURE — 80048 BASIC METABOLIC PNL TOTAL CA: CPT

## 2024-01-01 PROCEDURE — 99232 SBSQ HOSP IP/OBS MODERATE 35: CPT | Performed by: STUDENT IN AN ORGANIZED HEALTH CARE EDUCATION/TRAINING PROGRAM

## 2024-01-01 RX ADMIN — SODIUM CHLORIDE, PRESERVATIVE FREE 10 ML: 5 INJECTION INTRAVENOUS at 22:37

## 2024-01-01 RX ADMIN — SODIUM CHLORIDE, PRESERVATIVE FREE 10 ML: 5 INJECTION INTRAVENOUS at 08:24

## 2024-01-01 RX ADMIN — ANTI-FUNGAL POWDER MICONAZOLE NITRATE TALC FREE: 1.42 POWDER TOPICAL at 16:04

## 2024-01-01 RX ADMIN — FLUTICASONE PROPIONATE 2 SPRAY: 50 SPRAY, METERED NASAL at 08:24

## 2024-01-01 RX ADMIN — LANSOPRAZOLE 30 MG: 30 TABLET, ORALLY DISINTEGRATING, DELAYED RELEASE ORAL at 08:22

## 2024-01-01 RX ADMIN — MORPHINE SULFATE 2 MG: 2 INJECTION, SOLUTION INTRAMUSCULAR; INTRAVENOUS at 04:30

## 2024-01-01 RX ADMIN — METOPROLOL TARTRATE 12.5 MG: 25 TABLET ORAL at 08:23

## 2024-01-01 RX ADMIN — ASPIRIN 81 MG: 81 TABLET, COATED ORAL at 08:22

## 2024-01-01 RX ADMIN — GUAIFENESIN 200 MG: 200 SOLUTION ORAL at 04:33

## 2024-01-01 RX ADMIN — ANTI-FUNGAL POWDER MICONAZOLE NITRATE TALC FREE: 1.42 POWDER TOPICAL at 21:00

## 2024-01-01 RX ADMIN — METOPROLOL TARTRATE 12.5 MG: 25 TABLET ORAL at 20:52

## 2024-01-01 RX ADMIN — QUETIAPINE FUMARATE 50 MG: 25 TABLET ORAL at 22:36

## 2024-01-01 RX ADMIN — ACETAMINOPHEN 650 MG: 325 TABLET ORAL at 08:22

## 2024-01-01 RX ADMIN — ATORVASTATIN CALCIUM 20 MG: 40 TABLET, FILM COATED ORAL at 08:23

## 2024-01-01 ASSESSMENT — PAIN DESCRIPTION - ORIENTATION: ORIENTATION: MID

## 2024-01-01 ASSESSMENT — ENCOUNTER SYMPTOMS
ABDOMINAL PAIN: 0
CHEST TIGHTNESS: 0
BACK PAIN: 0
EYE REDNESS: 0
COUGH: 0
PHOTOPHOBIA: 0
ABDOMINAL DISTENTION: 0
CHOKING: 0
SHORTNESS OF BREATH: 0
FACIAL SWELLING: 0
APNEA: 0
WHEEZING: 0
COLOR CHANGE: 0

## 2024-01-01 ASSESSMENT — PAIN SCALES - GENERAL
PAINLEVEL_OUTOF10: 3
PAINLEVEL_OUTOF10: 0
PAINLEVEL_OUTOF10: 0
PAINLEVEL_OUTOF10: 7

## 2024-01-01 ASSESSMENT — PAIN - FUNCTIONAL ASSESSMENT: PAIN_FUNCTIONAL_ASSESSMENT: PREVENTS OR INTERFERES SOME ACTIVE ACTIVITIES AND ADLS

## 2024-01-01 ASSESSMENT — PAIN DESCRIPTION - LOCATION: LOCATION: ABDOMEN

## 2024-01-01 ASSESSMENT — PAIN DESCRIPTION - DESCRIPTORS: DESCRIPTORS: ACHING;DISCOMFORT

## 2024-01-01 ASSESSMENT — PAIN SCALES - WONG BAKER
WONGBAKER_NUMERICALRESPONSE: 0
WONGBAKER_NUMERICALRESPONSE: 0

## 2024-01-01 NOTE — DISCHARGE SUMMARY
[facility]  Office: [phone]  Mario Pacheco DO, Titi Soto DO, Aneesh Segundo DO, Ian Parker DO, Willem Rodriguez MD, Mary Troy MD, Ji Stein MD, Adali Gary MD,  Hudson Will MD, India Caruso MD, Ashley Agustin MD,  Hermes Connor DO, Elli Rodriguez MD, Jluis Hernandez MD, Ryan Pacheco DO, Kenzie Ruffin MD,  Sheldon Wall DO, Lluvia Holguin MD, Ella Marshall MD, Samantha Gonzalez MD, Magi Keith MD,  Jj Otero MD, Emili Montenegro MD, Sissy Farmer MD, Neno Weinberg MD, Red Thomason MD, Merna Hood MD, Min Cope DO, Alfa Guzman DO, Elise Kohler MD,  Roshan Hinds MD, Shirley Waterhouse, CNP,  Coty Goodman CNP, Carlito Tellez, CNP,  Luz Maria Zhou, INGRIS, Flavia Mckinney, CNP, Oneyda Rosen, CNP, Antonia Dempsey CNP, Loren Astorga, CNP, Anaya Dawkins, CNP, Mita Pink PA-C, Misti Carrizales PA-C, Lyndsay Morrissey, CNP, Wandy Chu, CNS, Wilma Kamara, CNP, Salma Champion, CNP, Tracy Schwab, CNP         [facility]   IN-PATIENT SERVICE   [facility]    Discharge Summary     Patient ID: Caio Arce  :  1940   MRN: [id number]     ACCOUNT:  [id number]   Patient's PCP: Jaqui Watkins PA-C  Admit Date: 12/10/2023   Discharge Date: 2024     Length of Stay: 29  Code Status:  Prior  Admitting Physician: No admitting provider for patient encounter.  Discharge Physician: Elli Rodriguez MD     Active Discharge Diagnoses:     Hospital Problem Lists:  Principal Problem:    COVID-19  Active Problems:    NSTEMI (non-ST elevated myocardial infarction) (HCC)    S/P MVR (mitral valve repair)    Aortic valve regurgitation    Permanent atrial fibrillation (HCC)    Hypertension    Hyperlipidemia    Pneumonia due to COVID-19 virus    Elevated troponin    Unvaccinated for covid-19    CRP elevated    Syncope and collapse    Orthostatic hypotension    H/O mitral valve replacement with mechanical valve    Delirium

## 2024-01-02 ENCOUNTER — TELEPHONE (OUTPATIENT)
Dept: PHARMACY | Age: 84
End: 2024-01-02

## 2024-01-02 LAB
ANION GAP SERPL CALCULATED.3IONS-SCNC: 11 MMOL/L (ref 9–17)
BUN SERPL-MCNC: 24 MG/DL (ref 8–23)
CALCIUM SERPL-MCNC: 8.7 MG/DL (ref 8.6–10.4)
CHLORIDE SERPL-SCNC: 102 MMOL/L (ref 98–107)
CO2 SERPL-SCNC: 25 MMOL/L (ref 20–31)
CREAT SERPL-MCNC: 0.5 MG/DL (ref 0.7–1.2)
GFR SERPL CREATININE-BSD FRML MDRD: >60 ML/MIN/1.73M2
GLUCOSE BLD-MCNC: 104 MG/DL (ref 75–110)
GLUCOSE BLD-MCNC: 109 MG/DL (ref 75–110)
GLUCOSE BLD-MCNC: 109 MG/DL (ref 75–110)
GLUCOSE BLD-MCNC: 111 MG/DL (ref 75–110)
GLUCOSE BLD-MCNC: 111 MG/DL (ref 75–110)
GLUCOSE SERPL-MCNC: 106 MG/DL (ref 70–99)
INR PPP: 2.3
POTASSIUM SERPL-SCNC: 5 MMOL/L (ref 3.7–5.3)
PROTHROMBIN TIME: 24.5 SEC (ref 11.7–14.9)
SODIUM SERPL-SCNC: 138 MMOL/L (ref 135–144)

## 2024-01-02 PROCEDURE — 6370000000 HC RX 637 (ALT 250 FOR IP): Performed by: STUDENT IN AN ORGANIZED HEALTH CARE EDUCATION/TRAINING PROGRAM

## 2024-01-02 PROCEDURE — 36415 COLL VENOUS BLD VENIPUNCTURE: CPT

## 2024-01-02 PROCEDURE — 6370000000 HC RX 637 (ALT 250 FOR IP)

## 2024-01-02 PROCEDURE — 99232 SBSQ HOSP IP/OBS MODERATE 35: CPT | Performed by: STUDENT IN AN ORGANIZED HEALTH CARE EDUCATION/TRAINING PROGRAM

## 2024-01-02 PROCEDURE — 97530 THERAPEUTIC ACTIVITIES: CPT

## 2024-01-02 PROCEDURE — 2580000003 HC RX 258: Performed by: ANESTHESIOLOGY

## 2024-01-02 PROCEDURE — 2580000003 HC RX 258: Performed by: STUDENT IN AN ORGANIZED HEALTH CARE EDUCATION/TRAINING PROGRAM

## 2024-01-02 PROCEDURE — 82947 ASSAY GLUCOSE BLOOD QUANT: CPT

## 2024-01-02 PROCEDURE — 97110 THERAPEUTIC EXERCISES: CPT

## 2024-01-02 PROCEDURE — 80048 BASIC METABOLIC PNL TOTAL CA: CPT

## 2024-01-02 PROCEDURE — 2060000000 HC ICU INTERMEDIATE R&B

## 2024-01-02 PROCEDURE — 97535 SELF CARE MNGMENT TRAINING: CPT

## 2024-01-02 PROCEDURE — 85610 PROTHROMBIN TIME: CPT

## 2024-01-02 RX ORDER — WARFARIN SODIUM 5 MG/1
5 TABLET ORAL
Status: COMPLETED | OUTPATIENT
Start: 2024-01-02 | End: 2024-01-02

## 2024-01-02 RX ADMIN — ASPIRIN 81 MG: 81 TABLET, COATED ORAL at 09:18

## 2024-01-02 RX ADMIN — ATORVASTATIN CALCIUM 20 MG: 40 TABLET, FILM COATED ORAL at 09:18

## 2024-01-02 RX ADMIN — WARFARIN SODIUM 5 MG: 5 TABLET ORAL at 18:02

## 2024-01-02 RX ADMIN — SODIUM CHLORIDE, PRESERVATIVE FREE 10 ML: 5 INJECTION INTRAVENOUS at 20:11

## 2024-01-02 RX ADMIN — QUETIAPINE FUMARATE 50 MG: 25 TABLET ORAL at 20:10

## 2024-01-02 RX ADMIN — SODIUM CHLORIDE, PRESERVATIVE FREE 10 ML: 5 INJECTION INTRAVENOUS at 09:19

## 2024-01-02 RX ADMIN — ANTI-FUNGAL POWDER MICONAZOLE NITRATE TALC FREE: 1.42 POWDER TOPICAL at 09:19

## 2024-01-02 RX ADMIN — FLUTICASONE PROPIONATE 2 SPRAY: 50 SPRAY, METERED NASAL at 09:19

## 2024-01-02 RX ADMIN — LANSOPRAZOLE 30 MG: 30 TABLET, ORALLY DISINTEGRATING, DELAYED RELEASE ORAL at 09:19

## 2024-01-02 RX ADMIN — METOPROLOL TARTRATE 12.5 MG: 25 TABLET ORAL at 20:10

## 2024-01-02 RX ADMIN — SODIUM CHLORIDE, PRESERVATIVE FREE 10 ML: 5 INJECTION INTRAVENOUS at 20:10

## 2024-01-02 RX ADMIN — ANTI-FUNGAL POWDER MICONAZOLE NITRATE TALC FREE: 1.42 POWDER TOPICAL at 20:11

## 2024-01-02 RX ADMIN — METOPROLOL TARTRATE 12.5 MG: 25 TABLET ORAL at 09:19

## 2024-01-02 NOTE — CARE COORDINATION
Confirmed with patient that plan is to go to [facility]. Spoke to Lainey from [facility] centralized intake. She is awaiting insurance verification and then will start precert

## 2024-01-02 NOTE — TELEPHONE ENCOUNTER
Clinic received new referral for AC, called to schedule, spoke with wife.  Patient uses [employer] for mgmt, will cancel referral.     Amairani Farias, [facility], CACP  Clinical Pharmacist Medication Management  1/2/2024  3:19 PM

## 2024-01-03 LAB
ANION GAP SERPL CALCULATED.3IONS-SCNC: 9 MMOL/L (ref 9–17)
BUN SERPL-MCNC: 27 MG/DL (ref 8–23)
CALCIUM SERPL-MCNC: 8.7 MG/DL (ref 8.6–10.4)
CHLORIDE SERPL-SCNC: 102 MMOL/L (ref 98–107)
CO2 SERPL-SCNC: 27 MMOL/L (ref 20–31)
CREAT SERPL-MCNC: 0.5 MG/DL (ref 0.7–1.2)
GFR SERPL CREATININE-BSD FRML MDRD: >60 ML/MIN/1.73M2
GLUCOSE BLD-MCNC: 100 MG/DL (ref 75–110)
GLUCOSE BLD-MCNC: 112 MG/DL (ref 75–110)
GLUCOSE BLD-MCNC: 86 MG/DL (ref 75–110)
GLUCOSE BLD-MCNC: 95 MG/DL (ref 75–110)
GLUCOSE BLD-MCNC: 96 MG/DL (ref 75–110)
GLUCOSE SERPL-MCNC: 111 MG/DL (ref 70–99)
INR PPP: 2.3
POTASSIUM SERPL-SCNC: 4.7 MMOL/L (ref 3.7–5.3)
PROTHROMBIN TIME: 25 SEC (ref 11.7–14.9)
SODIUM SERPL-SCNC: 138 MMOL/L (ref 135–144)

## 2024-01-03 PROCEDURE — 80048 BASIC METABOLIC PNL TOTAL CA: CPT

## 2024-01-03 PROCEDURE — 94761 N-INVAS EAR/PLS OXIMETRY MLT: CPT

## 2024-01-03 PROCEDURE — 6370000000 HC RX 637 (ALT 250 FOR IP): Performed by: STUDENT IN AN ORGANIZED HEALTH CARE EDUCATION/TRAINING PROGRAM

## 2024-01-03 PROCEDURE — 82947 ASSAY GLUCOSE BLOOD QUANT: CPT

## 2024-01-03 PROCEDURE — 97110 THERAPEUTIC EXERCISES: CPT

## 2024-01-03 PROCEDURE — 36415 COLL VENOUS BLD VENIPUNCTURE: CPT

## 2024-01-03 PROCEDURE — 97530 THERAPEUTIC ACTIVITIES: CPT

## 2024-01-03 PROCEDURE — 99232 SBSQ HOSP IP/OBS MODERATE 35: CPT | Performed by: STUDENT IN AN ORGANIZED HEALTH CARE EDUCATION/TRAINING PROGRAM

## 2024-01-03 PROCEDURE — 92526 ORAL FUNCTION THERAPY: CPT

## 2024-01-03 PROCEDURE — 6370000000 HC RX 637 (ALT 250 FOR IP)

## 2024-01-03 PROCEDURE — 2580000003 HC RX 258: Performed by: ANESTHESIOLOGY

## 2024-01-03 PROCEDURE — 2580000003 HC RX 258: Performed by: STUDENT IN AN ORGANIZED HEALTH CARE EDUCATION/TRAINING PROGRAM

## 2024-01-03 PROCEDURE — 2060000000 HC ICU INTERMEDIATE R&B

## 2024-01-03 PROCEDURE — 85610 PROTHROMBIN TIME: CPT

## 2024-01-03 RX ORDER — WARFARIN SODIUM 5 MG/1
5 TABLET ORAL
Status: COMPLETED | OUTPATIENT
Start: 2024-01-03 | End: 2024-01-03

## 2024-01-03 RX ADMIN — WARFARIN SODIUM 5 MG: 5 TABLET ORAL at 20:03

## 2024-01-03 RX ADMIN — ATORVASTATIN CALCIUM 20 MG: 40 TABLET, FILM COATED ORAL at 09:40

## 2024-01-03 RX ADMIN — ANTI-FUNGAL POWDER MICONAZOLE NITRATE TALC FREE: 1.42 POWDER TOPICAL at 09:41

## 2024-01-03 RX ADMIN — FLUTICASONE PROPIONATE 2 SPRAY: 50 SPRAY, METERED NASAL at 09:40

## 2024-01-03 RX ADMIN — METOPROLOL TARTRATE 12.5 MG: 25 TABLET ORAL at 20:03

## 2024-01-03 RX ADMIN — SODIUM CHLORIDE, PRESERVATIVE FREE 10 ML: 5 INJECTION INTRAVENOUS at 20:04

## 2024-01-03 RX ADMIN — METOPROLOL TARTRATE 12.5 MG: 25 TABLET ORAL at 09:40

## 2024-01-03 RX ADMIN — SODIUM CHLORIDE, PRESERVATIVE FREE 10 ML: 5 INJECTION INTRAVENOUS at 09:41

## 2024-01-03 RX ADMIN — ANTI-FUNGAL POWDER MICONAZOLE NITRATE TALC FREE: 1.42 POWDER TOPICAL at 20:04

## 2024-01-03 RX ADMIN — SALINE NASAL SPRAY 1 SPRAY: 1.5 SOLUTION NASAL at 09:40

## 2024-01-03 RX ADMIN — ASPIRIN 81 MG: 81 TABLET, COATED ORAL at 09:40

## 2024-01-03 NOTE — CARE COORDINATION
Voicemail left for [facility] centralized intake requesting return call to follow up on precert for [place] location    1522 received call from Lainey from [facility]. Precert pending. Patient and wife updated

## 2024-01-04 ENCOUNTER — APPOINTMENT (OUTPATIENT)
Dept: GENERAL RADIOLOGY | Age: 84
DRG: 208 | End: 2024-01-04
Payer: MEDICARE

## 2024-01-04 LAB
GLUCOSE BLD-MCNC: 102 MG/DL (ref 75–110)
GLUCOSE BLD-MCNC: 103 MG/DL (ref 75–110)
GLUCOSE BLD-MCNC: 119 MG/DL (ref 75–110)
GLUCOSE BLD-MCNC: 93 MG/DL (ref 75–110)
INR PPP: 2.6
PROTHROMBIN TIME: 27.4 SEC (ref 11.7–14.9)

## 2024-01-04 PROCEDURE — 6370000000 HC RX 637 (ALT 250 FOR IP): Performed by: STUDENT IN AN ORGANIZED HEALTH CARE EDUCATION/TRAINING PROGRAM

## 2024-01-04 PROCEDURE — 6370000000 HC RX 637 (ALT 250 FOR IP)

## 2024-01-04 PROCEDURE — 97530 THERAPEUTIC ACTIVITIES: CPT

## 2024-01-04 PROCEDURE — 97110 THERAPEUTIC EXERCISES: CPT

## 2024-01-04 PROCEDURE — 74230 X-RAY XM SWLNG FUNCJ C+: CPT

## 2024-01-04 PROCEDURE — 85610 PROTHROMBIN TIME: CPT

## 2024-01-04 PROCEDURE — 82947 ASSAY GLUCOSE BLOOD QUANT: CPT

## 2024-01-04 PROCEDURE — 2580000003 HC RX 258: Performed by: ANESTHESIOLOGY

## 2024-01-04 PROCEDURE — 36415 COLL VENOUS BLD VENIPUNCTURE: CPT

## 2024-01-04 PROCEDURE — 2060000000 HC ICU INTERMEDIATE R&B

## 2024-01-04 PROCEDURE — 99231 SBSQ HOSP IP/OBS SF/LOW 25: CPT | Performed by: STUDENT IN AN ORGANIZED HEALTH CARE EDUCATION/TRAINING PROGRAM

## 2024-01-04 PROCEDURE — 92611 MOTION FLUOROSCOPY/SWALLOW: CPT

## 2024-01-04 RX ORDER — WARFARIN SODIUM 2 MG/1
4 TABLET ORAL
Status: COMPLETED | OUTPATIENT
Start: 2024-01-04 | End: 2024-01-04

## 2024-01-04 RX ADMIN — METOPROLOL TARTRATE 12.5 MG: 25 TABLET ORAL at 08:58

## 2024-01-04 RX ADMIN — LANSOPRAZOLE 30 MG: 30 TABLET, ORALLY DISINTEGRATING, DELAYED RELEASE ORAL at 08:58

## 2024-01-04 RX ADMIN — SODIUM CHLORIDE, PRESERVATIVE FREE 10 ML: 5 INJECTION INTRAVENOUS at 21:35

## 2024-01-04 RX ADMIN — SODIUM CHLORIDE, PRESERVATIVE FREE 10 ML: 5 INJECTION INTRAVENOUS at 09:13

## 2024-01-04 RX ADMIN — ANTI-FUNGAL POWDER MICONAZOLE NITRATE TALC FREE: 1.42 POWDER TOPICAL at 11:26

## 2024-01-04 RX ADMIN — ANTI-FUNGAL POWDER MICONAZOLE NITRATE TALC FREE: 1.42 POWDER TOPICAL at 21:35

## 2024-01-04 RX ADMIN — METOPROLOL TARTRATE 12.5 MG: 25 TABLET ORAL at 21:34

## 2024-01-04 RX ADMIN — ASPIRIN 81 MG: 81 TABLET, COATED ORAL at 08:58

## 2024-01-04 RX ADMIN — WARFARIN SODIUM 4 MG: 2 TABLET ORAL at 19:30

## 2024-01-04 RX ADMIN — ATORVASTATIN CALCIUM 20 MG: 40 TABLET, FILM COATED ORAL at 08:58

## 2024-01-04 ASSESSMENT — PAIN SCALES - GENERAL
PAINLEVEL_OUTOF10: 0
PAINLEVEL_OUTOF10: 0

## 2024-01-04 NOTE — PROCEDURES
INSTRUMENTAL SWALLOW REPORT  MODIFIED BARIUM SWALLOW    NAME: Caio Arce   : 1940  MRN: [id number]       Date of Eval: 2023        Radiologist: Dr. Lawrence    Past Medical History:  has a past medical history of Anxiety, Arthritis, BPH (benign prostatic hyperplasia), Constipation, COPD (chronic obstructive pulmonary disease) (HCC), Current use of aspirin, Hyperglycemia, Hyperlipidemia, Hypertension, MVP (mitral valve prolapse), Osteoarthritis, Reflux, and Wears glasses.  Past Surgical History:  has a past surgical history that includes pacemaker placement (10/14/2014); Inguinal hernia repair (Bilateral, 94, 96); Cardiac valve replacement (10/14/2014); and other surgical history (Left, 2014).       Type of Study: Initial MBS      Patient Complaints/Reason for Referral:  Caio Arce was referred for a MBS to assess the efficiency of his/her swallow function, assess for aspiration, and to make recommendations regarding safe dietary consistencies, effective compensatory strategies, and safe eating environment.    Behavior/Cognition/Vision/Hearing:  Behavior/Cognition: Alert;Cooperative  Vision: Impaired  Hearing: Exceptions to WFL    Impressions:  Pt. Given trial of puree.  + penetration, + gross aspiration with cough.  Min-mod vallecula and pyriform residual noted.  Decreased A-P transit, premature spill and decreased epiglottic inversion.  Pt. Not appropriate for PO at this time.  ST to recommend NPO.  Results and recommendations reported to RN.     Consistencies Administered: Pureed    Recommended Diet:  Solid consistency: NPO  Liquid consistency: NPO  Medication administration: Via NG/PEG    Recommendations/Treatment  Requires SLP Intervention: Yes  D/C Recommendations: Ongoing speech therapy is recommended during this hospitalization;Ongoing speech therapy is recommended at next level of care  Frequency: 3-5 x week    Recommended Exercises:    Therapeutic Interventions: Patient/Family 
EEG REPORT       Patient: Caio Arce Age: 83 y.o.  MRN: [id number]      Referring Provider: No ref. provider found    History: This routine 30 minute scalp EEG was recorded with video- monitoring for a 83 y.o.. male who presented with encephalopathy. This EEG was performed to evaluate for focal and epileptiform abnormalities.     Caio Arce   Current Facility-Administered Medications   Medication Dose Route Frequency Provider Last Rate Last Admin    ipratropium 0.5 mg-albuterol 2.5 mg (DUONEB) nebulizer solution 1 Dose  1 Dose Inhalation Q6H WA RT Ayaka Betts MD   1 Dose at 12/18/23 1449    albuterol (PROVENTIL) (2.5 MG/3ML) 0.083% nebulizer solution 2.5 mg  2.5 mg Nebulization Q4H PRN Juan Bartlett MD        heparin (porcine) injection 3,820 Units  60 Units/kg IntraVENous PRN Carol Monge MD        heparin (porcine) injection 1,910 Units  30 Units/kg IntraVENous PRN Carol Monge MD        heparin 25,000 units in dextrose 5% 250 mL (premix) infusion  5-30 Units/kg/hr IntraVENous Continuous Carol Monge MD 7.6 mL/hr at 12/18/23 1912 12 Units/kg/hr at 12/18/23 1912    cefOXitin (MEFOXIN) 2,000 mg in sodium chloride 0.9 % 50 mL IVPB (mini-bag)  2,000 mg IntraVENous Q8H Justin Rivera MD   Stopped at 12/18/23 1550    [Held by provider] metoprolol tartrate (LOPRESSOR) tablet 12.5 mg  12.5 mg Oral BID Erik Abbasi MD   12.5 mg at 12/14/23 1030    atorvastatin (LIPITOR) tablet 20 mg  20 mg Oral Daily Esther Ramos MD   20 mg at 12/18/23 0818    famotidine (PEPCID) tablet 20 mg  20 mg Oral BID Esther Ramos MD   20 mg at 12/18/23 0818    [Held by provider] aspirin EC tablet 81 mg  81 mg Oral Daily Esther Ramos MD        metoprolol (LOPRESSOR) injection 5 mg  5 mg IntraVENous Q6H PRN Seferino Smith MD   5 mg at 12/18/23 1305    dexmedetomidine (PRECEDEX) 400 mcg in sodium chloride 0.9 % 100 mL infusion  0.1-1.5 mcg/kg/hr IntraVENous Continuous Seferino Smith MD   Stopped at 12/18/23 
not received the COVID-vaccine.     Home meds: Allopurinol 100, Lipitor 20, metoprolol tartrate 25, warfarin      In the ED the patient was found to have hypokalemia 3.6, DAMARIS creatinine 1.4, troponin 38 from a normal baseline, INR 3.1, CT showed right middle lobe pneumonia, respiratory testing showed COVID-19 positive.  His curb 65 score was 2.  He received ceftriaxone and azithromycin, potassium replacement, normal saline infusion.  Internal medicine was consulted for admission.          Behavior/Cognition/Vision/Hearing:  Behavior/Cognition: Alert;Cooperative  Vision: Impaired  Hearing: Exceptions to WFL    Impressions: Patient presents with  safe swallow for Dysphagia Minced and Moist (Dysphagia II)  diet with thin liquids as evidenced by no observed aspiration noted with consistencies tested. Pt with + swallow delay therefore it is important for pt. To be upright at 90 degrees for all PO to decrease aspiration risk.  Recommend small sips and bites, only feed when alert and awake and upright at 90 degrees for all PO intake.  Recommend close monitoring for overt/clinical s/s of aspiration and D/C PO intake and complete Modified Barium Swallow Study should they occur.  Results and recommendations reported to RN.      Patient Position: Lateral       Consistencies Administered: Soft & Bite Sized;Pureed;Thin straw;Mildly Thick straw;Mildly Thick teaspoon      Dysphagia Outcome Severity Scale: Level 4: Mild moderate dysphagia- Intermittent supervision/cueing. One - two diet consistencies restricted  Penetration-Aspiration Scale (PAS): 2 - Material enters the airway, remains above the vocal folds, and is ejected from the airway    Recommended Diet:  Solid consistency: Minced and Moist  Liquid consistency: Thin  Liquid administration via: Straw    Medication administration: Meds in puree    Safe Swallow Protocol:     Compensatory Swallowing Strategies : Alternate solids and liquids;Small bites/sips;Upright as possible

## 2024-01-05 LAB
GLUCOSE BLD-MCNC: 106 MG/DL (ref 75–110)
GLUCOSE BLD-MCNC: 117 MG/DL (ref 75–110)
GLUCOSE BLD-MCNC: 132 MG/DL (ref 75–110)
INR PPP: 3
PROTHROMBIN TIME: 30.4 SEC (ref 11.7–14.9)

## 2024-01-05 PROCEDURE — 82947 ASSAY GLUCOSE BLOOD QUANT: CPT

## 2024-01-05 PROCEDURE — 6370000000 HC RX 637 (ALT 250 FOR IP): Performed by: STUDENT IN AN ORGANIZED HEALTH CARE EDUCATION/TRAINING PROGRAM

## 2024-01-05 PROCEDURE — 97530 THERAPEUTIC ACTIVITIES: CPT

## 2024-01-05 PROCEDURE — 99231 SBSQ HOSP IP/OBS SF/LOW 25: CPT | Performed by: STUDENT IN AN ORGANIZED HEALTH CARE EDUCATION/TRAINING PROGRAM

## 2024-01-05 PROCEDURE — 6370000000 HC RX 637 (ALT 250 FOR IP)

## 2024-01-05 PROCEDURE — 97110 THERAPEUTIC EXERCISES: CPT

## 2024-01-05 PROCEDURE — 36415 COLL VENOUS BLD VENIPUNCTURE: CPT

## 2024-01-05 PROCEDURE — 85610 PROTHROMBIN TIME: CPT

## 2024-01-05 PROCEDURE — 99212 OFFICE O/P EST SF 10 MIN: CPT

## 2024-01-05 PROCEDURE — 2060000000 HC ICU INTERMEDIATE R&B

## 2024-01-05 PROCEDURE — 2580000003 HC RX 258: Performed by: ANESTHESIOLOGY

## 2024-01-05 RX ORDER — WARFARIN SODIUM 2.5 MG/1
2.5 TABLET ORAL
Status: COMPLETED | OUTPATIENT
Start: 2024-01-05 | End: 2024-01-05

## 2024-01-05 RX ADMIN — SODIUM CHLORIDE, PRESERVATIVE FREE 10 ML: 5 INJECTION INTRAVENOUS at 20:24

## 2024-01-05 RX ADMIN — METOPROLOL TARTRATE 12.5 MG: 25 TABLET ORAL at 20:24

## 2024-01-05 RX ADMIN — WARFARIN SODIUM 2.5 MG: 2.5 TABLET ORAL at 18:31

## 2024-01-05 RX ADMIN — ASPIRIN 81 MG: 81 TABLET, COATED ORAL at 10:13

## 2024-01-05 RX ADMIN — ATORVASTATIN CALCIUM 20 MG: 40 TABLET, FILM COATED ORAL at 10:12

## 2024-01-05 RX ADMIN — ANTI-FUNGAL POWDER MICONAZOLE NITRATE TALC FREE: 1.42 POWDER TOPICAL at 11:27

## 2024-01-05 RX ADMIN — ANTI-FUNGAL POWDER MICONAZOLE NITRATE TALC FREE: 1.42 POWDER TOPICAL at 20:24

## 2024-01-05 RX ADMIN — FLUTICASONE PROPIONATE 2 SPRAY: 50 SPRAY, METERED NASAL at 10:18

## 2024-01-05 RX ADMIN — METOPROLOL TARTRATE 12.5 MG: 25 TABLET ORAL at 10:13

## 2024-01-05 RX ADMIN — LANSOPRAZOLE 30 MG: 30 TABLET, ORALLY DISINTEGRATING, DELAYED RELEASE ORAL at 10:12

## 2024-01-05 RX ADMIN — SODIUM CHLORIDE, PRESERVATIVE FREE 10 ML: 5 INJECTION INTRAVENOUS at 10:19

## 2024-01-05 ASSESSMENT — PAIN SCALES - GENERAL
PAINLEVEL_OUTOF10: 0

## 2024-01-05 NOTE — CARE COORDINATION
[facility] Quality Flow/Interdisciplinary Rounds Progress Note    Quality Flow Rounds held on January 5, 2024 at 0930    Disciplines Attending:  Bedside Nurse, , and Nursing Unit Leadership    Barriers to Discharge: precert    Anticipated Discharge Date:   TBD    Anticipated Discharge Disposition:SNF    Readmission Risk              Risk of Unplanned Readmission:  30           Discussed patient goal for the day, patient clinical progression, and barriers to discharge.  The following Goal(s) of the Day/Commitment(s) have been identified:      Called [facility] intake spoke to melissa SINGH RN  January 5, 2024

## 2024-01-06 LAB
GLUCOSE BLD-MCNC: 104 MG/DL (ref 75–110)
GLUCOSE BLD-MCNC: 107 MG/DL (ref 75–110)
GLUCOSE BLD-MCNC: 91 MG/DL (ref 75–110)
GLUCOSE BLD-MCNC: 97 MG/DL (ref 75–110)
INR PPP: 2.9
PROTHROMBIN TIME: 29.7 SEC (ref 11.7–14.9)

## 2024-01-06 PROCEDURE — 6370000000 HC RX 637 (ALT 250 FOR IP): Performed by: STUDENT IN AN ORGANIZED HEALTH CARE EDUCATION/TRAINING PROGRAM

## 2024-01-06 PROCEDURE — 99231 SBSQ HOSP IP/OBS SF/LOW 25: CPT | Performed by: STUDENT IN AN ORGANIZED HEALTH CARE EDUCATION/TRAINING PROGRAM

## 2024-01-06 PROCEDURE — 2060000000 HC ICU INTERMEDIATE R&B

## 2024-01-06 PROCEDURE — 6370000000 HC RX 637 (ALT 250 FOR IP)

## 2024-01-06 PROCEDURE — 82947 ASSAY GLUCOSE BLOOD QUANT: CPT

## 2024-01-06 PROCEDURE — 36415 COLL VENOUS BLD VENIPUNCTURE: CPT

## 2024-01-06 PROCEDURE — 2580000003 HC RX 258: Performed by: STUDENT IN AN ORGANIZED HEALTH CARE EDUCATION/TRAINING PROGRAM

## 2024-01-06 PROCEDURE — 2580000003 HC RX 258: Performed by: ANESTHESIOLOGY

## 2024-01-06 PROCEDURE — 85610 PROTHROMBIN TIME: CPT

## 2024-01-06 RX ORDER — WARFARIN SODIUM 2.5 MG/1
2.5 TABLET ORAL
Status: COMPLETED | OUTPATIENT
Start: 2024-01-06 | End: 2024-01-06

## 2024-01-06 RX ADMIN — SODIUM CHLORIDE, PRESERVATIVE FREE 10 ML: 5 INJECTION INTRAVENOUS at 09:30

## 2024-01-06 RX ADMIN — SODIUM CHLORIDE, PRESERVATIVE FREE 10 ML: 5 INJECTION INTRAVENOUS at 20:14

## 2024-01-06 RX ADMIN — ANTI-FUNGAL POWDER MICONAZOLE NITRATE TALC FREE: 1.42 POWDER TOPICAL at 20:14

## 2024-01-06 RX ADMIN — WARFARIN SODIUM 2.5 MG: 2.5 TABLET ORAL at 17:49

## 2024-01-06 RX ADMIN — ANTI-FUNGAL POWDER MICONAZOLE NITRATE TALC FREE: 1.42 POWDER TOPICAL at 09:30

## 2024-01-06 RX ADMIN — HYPROMELLOSE: 0 GEL OPHTHALMIC at 17:49

## 2024-01-06 RX ADMIN — ASPIRIN 81 MG: 81 TABLET, COATED ORAL at 09:29

## 2024-01-06 RX ADMIN — METOPROLOL TARTRATE 12.5 MG: 25 TABLET ORAL at 20:14

## 2024-01-06 RX ADMIN — FLUTICASONE PROPIONATE 2 SPRAY: 50 SPRAY, METERED NASAL at 09:29

## 2024-01-06 RX ADMIN — LANSOPRAZOLE 30 MG: 30 TABLET, ORALLY DISINTEGRATING, DELAYED RELEASE ORAL at 09:29

## 2024-01-06 RX ADMIN — ATORVASTATIN CALCIUM 20 MG: 40 TABLET, FILM COATED ORAL at 09:29

## 2024-01-06 RX ADMIN — METOPROLOL TARTRATE 12.5 MG: 25 TABLET ORAL at 09:29

## 2024-01-06 ASSESSMENT — PAIN SCALES - GENERAL: PAINLEVEL_OUTOF10: 0

## 2024-01-06 NOTE — CONSULTS
Nutrition Note    Consulted for TF Ordering & Management    Pt TF changed to cyclic yesterday. Per RN, pt tolerated TF overnight, pt ate >75% of breakfast this morning, pt did not yet drink Ensure. Pt reports he just didn't have the room to drink his ONS this morning, but plans on having later in the day. Pt unsure if he likes Ensure, does not recall drinking before. Pt endorses liking ice cream, encouraged him to eat the Magic Cup at lunch.     Recommendations:  Continue current TF: Diabetic at 50 mL/hr x 20 hrs, meets ~45% of nutrition needs.   Continue current diet per SLP, Minced & Moist with Thin liquids.  Modify ONS to high kcal/high PRO ONS once daily and frozen ONS BID.  Monitor PO intake. If PO/ONS intake > 75% of meals, can discontinue tube feeding.     See full RDN assessment on 1/3/24 for additional details.     Electronically signed by Phoebe Vieyra, MS, RD, LD on 1/6/24 at 11:07 AM EST    Contact:   Floor Mobile: [phone]  Desk Phone: [phone]  RD Weekend Mobile: [phone]      
     Nutrition Note    Consulted for Tube Feedings Order and Management.  Pt s/p PEG tube/G-tube placement on 12/23.  Diabetic TF running at 10 mL/hr - discussed goal rate of 50 mL/hr which will provide 1800 kcals, 99 gm protein, 911 mL free water.  Suggest free water flushes of 150 mL q 4 hrs.  Will continue to monitor TF tolerance/adequacy of intakes.    Electronically signed by Magdalena Chaney RD, LD on 12/26/23 at 2:21 PM EST    Contact: [phone]  
  General Surgery  Consult    PATIENT NAME: Caio Arce  AGE: 83 y.o.  MEDICAL RECORD NO. [id number]  DATE: 12/22/2023  SURGEON:   PRIMARY CARE PHYSICIAN: Jaqui Watkins PA-C    Patient evaluated at the request of  Dr. Pacheco  Reason for evaluation: PEG placement    Patient information was obtained from patient.  History/Exam limitations: none.    IMPRESSION:     Patient Active Problem List   Diagnosis    Arthritis    COPD (chronic obstructive pulmonary disease) (HCC)    Reflux    Hypertension    Hyperlipidemia    Hyperglycemia    Presence of cardiac pacemaker    Groin mass    Hyperkalemia    Varicose veins    Orthostasis    Hypotension due to drugs    Vertigo    Anticoagulant long-term use    History of mitral valve replacement    Pacemaker failure, initial encounter    Complete heart block (HCC)    Nicotine dependence, uncomplicated    Sick sinus syndrome (HCC)    Paroxysmal atrial fibrillation ([facility])    Hypokalemia    Hypocalcemia    Pacemaker at end of battery life    COVID-19    Pneumonia due to COVID-19 virus    Elevated troponin    Unvaccinated for covid-19    CRP elevated    Syncope and collapse    Orthostatic hypotension    H/O mitral valve replacement with mechanical valve    Delirium    NSTEMI (non-ST elevated myocardial infarction) ([facility])    S/P MVR (mitral valve repair)    Aortic valve regurgitation    Permanent atrial fibrillation (HCC)    Multifocal pneumonia    Aspiration pneumonia of right middle lobe (HCC)    Bandemia    Acute respiratory failure with hypoxia (HCC)    Metabolic encephalopathy    Supratherapeutic INR    Acute metabolic encephalopathy    Elevated C-reactive protein (CRP)    Acute pain of right knee   Acute respiratory failure in setting of Covid 19 Pneumonia  Dysphagia, failed recent swallow study.        PLAN:   Continue with current medical management, hold Coumadin  Resident discussed with family who is agreeable for PEG placement.  Plan for PEG placement tomorrow possibly. 
  Reuben Grier, Bimal, Zuly, Joann, Orestes, Temitope, & Eran  Urology Consultation      Patient:  Caio Arce  MRN: [id number]  YOB: 1940    CHIEF COMPLAINT: Retention    HISTORY OF PRESENT ILLNESS:   The patient is a 83 y.o. male who presents with incomplete emptying.  Patient is altered and confused and COVID-positive.  Nurses were unable to straight cath however patient does appear to just have a history of holding urine.  When the patient does void postvoid residual was only 200 indicating that the patient is able to empty his bladder appropriately.  No urologic history on chart review.  Creatinine is also within normal limits at 0.8.  Urine culture from 12/11 negative.  CT abdomen pelvis from 12/10/2023 negative for hydronephrosis obstruction nephrolithiasis with a normal size prostate.    Patient's old records, notes and chart reviewed and summarized above.    Past Medical History:    Past Medical History:   Diagnosis Date    Anxiety     Arthritis     BPH (benign prostatic hyperplasia)     Constipation     COPD (chronic obstructive pulmonary disease) (HCC)     Current use of aspirin     Hyperglycemia     Hyperlipidemia     Hypertension     MVP (mitral valve prolapse)     Osteoarthritis     Reflux     Wears glasses        Past Surgical History:    Past Surgical History:   Procedure Laterality Date    CARDIAC VALVE REPLACEMENT  10/14/2014    INGUINAL HERNIA REPAIR Bilateral 94, 96    OTHER SURGICAL HISTORY Left 01/16/2014    excision of groin mass    PACEMAKER PLACEMENT  10/14/2014    [facility] - UNSAFE!!! pacemaker - [employer] - per rep not conditional for MRI       Medications:      Current Facility-Administered Medications:     ipratropium 0.5 mg-albuterol 2.5 mg (DUONEB) nebulizer solution 1 Dose, 1 Dose, Inhalation, Q6H Alpesh HOOPER Stacy, MD, 1 Dose at 12/18/23 2132    albuterol (PROVENTIL) (2.5 MG/3ML) 0.083% nebulizer solution 2.5 mg, 2.5 mg, Nebulization, Q4H PRN, 
    Worried About Running Out of Food in the Last Year: Never true     Ran Out of Food in the Last Year: Never true   Transportation Needs: No Transportation Needs (12/10/2023)    PRAPARE - Transportation     Lack of Transportation (Medical): No     Lack of Transportation (Non-Medical): No   Housing Stability: Low Risk  (12/10/2023)    Housing Stability Vital Sign     Unable to Pay for Housing in the Last Year: No     Number of Places Lived in the Last Year: 1     Unstable Housing in the Last Year: No     Family History:  Family History   Problem Relation Age of Onset    Dementia Mother     Heart Disease Father     Heart Attack Father     Colon Cancer Sister     High Blood Pressure Brother     Heart Disease Brother     Heart Attack Brother         defibrilator in       ROS:   Constitutional: Negative for fever and chills.   Respiratory: Cough, short of breath  Cardiovascular: Negative for chest pain.   Musculoskeletal: Positive for bilateral knee pain, pain with passive range of motion.  Bilateral leg weakness.  Skin: Negative for itching and rash.   Neurological: Negative for numbness, tingling.    PE:  Blood pressure (!) 133/101, pulse 66, temperature 97.9 °F (36.6 °C), resp. rate 28, height 1.753 m (5' 9.02\"), weight 63.7 kg (140 lb 6.9 oz), SpO2 96 %.    Gen: Alert and oriented, incomprehensible, appears in mild distress, cooperative, cachectic.    Head: Normocephalic, atraumatic.    Cardiovascular: Regular rate.    Respiratory: Chest symmetric, no accessory muscle use, breathing comfortably on 10 L/min oxygen mask.    RLE: Skin intact.  Small effusion. no ecchymoses, abrasions, deformity, or lacerations.  Tender to palpate on the anterior aspect of the patella as well as the medial lateral joint line. No bony crepitus felt on palpation.  Patellar grind positive. Compartments soft and easily compressible.  Knee flexion 1/5 motor limitation is weakness.  Passive knee flexion to 35 degrees induces moderate pain 
        Impression:      This is an 83 year old male with pneumonia, hypernatremia and COVID-19 infection which is likely etiology of AMS.     Plan:     MRI Brain  EEG  Limit sedation  Delirium precautions  B12, folate, b1, ammonia, TSH  Discussed with daughter at bedside  Will follow        Thank you for this very interesting consultation.      Electronically signed by Ammy Bethea DO on 12/16/2023 at 12:12 PM      Ammy Bethea DO  [facility]  Neurology   
MD  Fellow, cardiovascular diseases  [facility] [place]         Attending Physician Statement:    I have discussed the care of  Caio Arce , including pertinent history and exam findings, with the Cardiology fellow/resident.   I have seen  the patient and the key elements of all parts of the encounter have been performed by me. I agree with the assessment, plan and orders as documented by the fellow/resident.         
Special Requests R AC 20ML     Culture NO GROWTH <24 HRS    Respiratory Panel, Molecular, with COVID-19 (Restricted: peds pts or suitable admitted adults) [[id number]]  (Abnormal) Collected: 12/10/23 0740    Order Status: Completed Specimen: Nasopharyngeal Swab Updated: 12/10/23 0913     Specimen Description .NASOPHARYNGEAL SWAB     Adenovirus PCR Not Detected     Coronavirus 229E PCR Not Detected     Coronavirus HKU1 PCR Not Detected     Coronavirus NL63 PCR Not Detected     Coronavirus OC43 PCR Not Detected     SARS-CoV-2, PCR DETECTED     Comment: Results reported to the appropriate Health Department        Human Metapneumovirus PCR Not Detected     Rhino/Enterovirus PCR Not Detected     Influenza A by PCR Not Detected     Influenza B by PCR Not Detected     Parainfluenza 1 PCR Not Detected     Parainfluenza 2 PCR Not Detected     Parainfluenza 3 PCR Not Detected     Parainfluenza 4 PCR Not Detected     Resp Syncytial Virus PCR Not Detected     Bordetella parapertussis by PCR Not Detected     B Pertussis by PCR Not Detected     Chlamydia pneumoniae By PCR Not Detected     Mycoplasma pneumo by PCR Not Detected     Comment: Performed by multiplexed nucleic acid assay.                 Medical Decision Making-Other:     Note:      MD Jaqui Jimenes APRN    ATTESTATION:    I have discussed the case, including pertinent history and exam findings with the APRN. I have evaluated the  History, physical findings and pictures of the patient and the key elements of the encounter have been performed by me. I have reviewed the laboratory data, other diagnostic studies and discussed them with the APRN. I have updated the medical record where necessary.    I agree with the assessment, plan and orders as documented by the APRN.    In addition diagnostic and decision making elements, performed by the Attending Physician, are included in the Diagnostic and Decision Making Section of the text.    Justin Rivera MD

## 2024-01-07 LAB
GLUCOSE BLD-MCNC: 102 MG/DL (ref 75–110)
GLUCOSE BLD-MCNC: 99 MG/DL (ref 75–110)
INR PPP: 3
PROTHROMBIN TIME: 31 SEC (ref 11.7–14.9)

## 2024-01-07 PROCEDURE — 2580000003 HC RX 258: Performed by: ANESTHESIOLOGY

## 2024-01-07 PROCEDURE — 82947 ASSAY GLUCOSE BLOOD QUANT: CPT

## 2024-01-07 PROCEDURE — 6370000000 HC RX 637 (ALT 250 FOR IP): Performed by: STUDENT IN AN ORGANIZED HEALTH CARE EDUCATION/TRAINING PROGRAM

## 2024-01-07 PROCEDURE — 2580000003 HC RX 258: Performed by: STUDENT IN AN ORGANIZED HEALTH CARE EDUCATION/TRAINING PROGRAM

## 2024-01-07 PROCEDURE — 6370000000 HC RX 637 (ALT 250 FOR IP): Performed by: NURSE PRACTITIONER

## 2024-01-07 PROCEDURE — 6370000000 HC RX 637 (ALT 250 FOR IP)

## 2024-01-07 PROCEDURE — 99231 SBSQ HOSP IP/OBS SF/LOW 25: CPT | Performed by: STUDENT IN AN ORGANIZED HEALTH CARE EDUCATION/TRAINING PROGRAM

## 2024-01-07 PROCEDURE — 2060000000 HC ICU INTERMEDIATE R&B

## 2024-01-07 PROCEDURE — 85610 PROTHROMBIN TIME: CPT

## 2024-01-07 PROCEDURE — 36415 COLL VENOUS BLD VENIPUNCTURE: CPT

## 2024-01-07 RX ORDER — WARFARIN SODIUM 2 MG/1
2 TABLET ORAL
Status: COMPLETED | OUTPATIENT
Start: 2024-01-07 | End: 2024-01-07

## 2024-01-07 RX ORDER — LANOLIN ALCOHOL/MO/W.PET/CERES
6 CREAM (GRAM) TOPICAL NIGHTLY PRN
Status: DISCONTINUED | OUTPATIENT
Start: 2024-01-07 | End: 2024-01-08 | Stop reason: HOSPADM

## 2024-01-07 RX ADMIN — Medication 6 MG: at 22:45

## 2024-01-07 RX ADMIN — METOPROLOL TARTRATE 12.5 MG: 25 TABLET ORAL at 09:58

## 2024-01-07 RX ADMIN — SODIUM CHLORIDE, PRESERVATIVE FREE 10 ML: 5 INJECTION INTRAVENOUS at 22:47

## 2024-01-07 RX ADMIN — ANTI-FUNGAL POWDER MICONAZOLE NITRATE TALC FREE: 1.42 POWDER TOPICAL at 22:47

## 2024-01-07 RX ADMIN — FLUTICASONE PROPIONATE 2 SPRAY: 50 SPRAY, METERED NASAL at 09:59

## 2024-01-07 RX ADMIN — LANSOPRAZOLE 30 MG: 30 TABLET, ORALLY DISINTEGRATING, DELAYED RELEASE ORAL at 09:59

## 2024-01-07 RX ADMIN — METOPROLOL TARTRATE 12.5 MG: 25 TABLET ORAL at 22:46

## 2024-01-07 RX ADMIN — ASPIRIN 81 MG: 81 TABLET, COATED ORAL at 09:58

## 2024-01-07 RX ADMIN — HYPROMELLOSE: 0 GEL OPHTHALMIC at 14:49

## 2024-01-07 RX ADMIN — ANTI-FUNGAL POWDER MICONAZOLE NITRATE TALC FREE: 1.42 POWDER TOPICAL at 09:59

## 2024-01-07 RX ADMIN — SODIUM CHLORIDE, PRESERVATIVE FREE 10 ML: 5 INJECTION INTRAVENOUS at 09:59

## 2024-01-07 RX ADMIN — WARFARIN SODIUM 2 MG: 2 TABLET ORAL at 17:26

## 2024-01-07 RX ADMIN — ATORVASTATIN CALCIUM 20 MG: 40 TABLET, FILM COATED ORAL at 09:58

## 2024-01-07 ASSESSMENT — PAIN SCALES - GENERAL
PAINLEVEL_OUTOF10: 0

## 2024-01-07 NOTE — CARE COORDINATION
Call to [facility] centralized intake. Precert is pending for [facility]

## 2024-01-08 VITALS
OXYGEN SATURATION: 98 % | HEIGHT: 70 IN | BODY MASS INDEX: 22.57 KG/M2 | SYSTOLIC BLOOD PRESSURE: 114 MMHG | TEMPERATURE: 97.9 F | RESPIRATION RATE: 16 BRPM | WEIGHT: 157.63 LBS | DIASTOLIC BLOOD PRESSURE: 75 MMHG | HEART RATE: 62 BPM

## 2024-01-08 LAB
INR PPP: 2.5
MICROORGANISM SPEC CULT: NORMAL
MICROORGANISM/AGENT SPEC: NORMAL
PROTHROMBIN TIME: 26.8 SEC (ref 11.7–14.9)
SERVICE CMNT-IMP: NORMAL
SPECIMEN DESCRIPTION: NORMAL

## 2024-01-08 PROCEDURE — 99239 HOSP IP/OBS DSCHRG MGMT >30: CPT | Performed by: STUDENT IN AN ORGANIZED HEALTH CARE EDUCATION/TRAINING PROGRAM

## 2024-01-08 PROCEDURE — 6370000000 HC RX 637 (ALT 250 FOR IP): Performed by: STUDENT IN AN ORGANIZED HEALTH CARE EDUCATION/TRAINING PROGRAM

## 2024-01-08 PROCEDURE — 6370000000 HC RX 637 (ALT 250 FOR IP)

## 2024-01-08 PROCEDURE — 85610 PROTHROMBIN TIME: CPT

## 2024-01-08 PROCEDURE — 2580000003 HC RX 258: Performed by: ANESTHESIOLOGY

## 2024-01-08 PROCEDURE — 36415 COLL VENOUS BLD VENIPUNCTURE: CPT

## 2024-01-08 PROCEDURE — 97535 SELF CARE MNGMENT TRAINING: CPT

## 2024-01-08 RX ADMIN — ANTI-FUNGAL POWDER MICONAZOLE NITRATE TALC FREE: 1.42 POWDER TOPICAL at 08:36

## 2024-01-08 RX ADMIN — SODIUM CHLORIDE, PRESERVATIVE FREE 10 ML: 5 INJECTION INTRAVENOUS at 08:33

## 2024-01-08 RX ADMIN — LANSOPRAZOLE 30 MG: 30 TABLET, ORALLY DISINTEGRATING, DELAYED RELEASE ORAL at 08:33

## 2024-01-08 RX ADMIN — ATORVASTATIN CALCIUM 20 MG: 40 TABLET, FILM COATED ORAL at 08:33

## 2024-01-08 RX ADMIN — METOPROLOL TARTRATE 12.5 MG: 25 TABLET ORAL at 08:33

## 2024-01-08 RX ADMIN — FLUTICASONE PROPIONATE 2 SPRAY: 50 SPRAY, METERED NASAL at 09:49

## 2024-01-08 RX ADMIN — ASPIRIN 81 MG: 81 TABLET, COATED ORAL at 08:33

## 2024-01-08 ASSESSMENT — PAIN SCALES - GENERAL
PAINLEVEL_OUTOF10: 0
PAINLEVEL_OUTOF10: 0

## 2024-01-08 NOTE — PLAN OF CARE
Problem: Discharge Planning  Goal: Discharge to home or other facility with appropriate resources  12/16/2023 1826 by Alem Boyd RN  Outcome: Not Progressing     Problem: Skin/Tissue Integrity  Goal: Absence of new skin breakdown  Description: 1.  Monitor for areas of redness and/or skin breakdown  2.  Assess vascular access sites hourly  3.  Every 4-6 hours minimum:  Change oxygen saturation probe site  4.  Every 4-6 hours:  If on nasal continuous positive airway pressure, respiratory therapy assess nares and determine need for appliance change or resting period.  12/16/2023 1826 by Alem Boyd, RN  Outcome: Not Progressing     Problem: Safety - Adult  Goal: Free from fall injury  12/16/2023 1826 by Alem Boyd RN  Outcome: Progressing     Problem: Pain  Goal: Verbalizes/displays adequate comfort level or baseline comfort level  12/16/2023 1826 by Alem Boyd, RN  Outcome: Progressing     Problem: Nutrition Deficit:  Goal: Optimize nutritional status  12/16/2023 1826 by Alem Boyd RN  Outcome: Progressing  Flowsheets (Taken 12/16/2023 1155 by Aster Ellsworth, RD, LD)  Nutrient intake appropriate for improving, restoring, or maintaining nutritional needs:   Recommend, monitor, and adjust tube feedings and TPN/PPN based on assessed needs   Assess nutritional status and recommend course of action     Problem: ABCDS Injury Assessment  Goal: Absence of physical injury  12/16/2023 1826 by Alem Boyd, RN  Outcome: Progressing        
  Problem: Discharge Planning  Goal: Discharge to home or other facility with appropriate resources  12/17/2023 0145 by Kojo Alcantar, RN  Outcome: Progressing  12/16/2023 1826 by Alem Boyd, RN  Outcome: Not Progressing     Problem: Skin/Tissue Integrity  Goal: Absence of new skin breakdown  Description: 1.  Monitor for areas of redness and/or skin breakdown  2.  Assess vascular access sites hourly  3.  Every 4-6 hours minimum:  Change oxygen saturation probe site  4.  Every 4-6 hours:  If on nasal continuous positive airway pressure, respiratory therapy assess nares and determine need for appliance change or resting period.  12/17/2023 0145 by Kojo Alcantar, RN  Outcome: Progressing  12/16/2023 1826 by Alem Boyd, RN  Outcome: Not Progressing     
  Problem: Respiratory - Adult  Goal: Achieves optimal ventilation and oxygenation  12/17/2023 0932 by Britney Cobb RCP  Outcome: Progressing  
  Problem: Respiratory - Adult  Goal: Achieves optimal ventilation and oxygenation  12/17/2023 2151 by Genesis Goddard RCP  Outcome: Progressing   BRONCHOSPASM/BRONCHOCONSTRICTION     [x]         IMPROVE AERATION/BREATH SOUNDS  [x]   ADMINISTER BRONCHODILATOR THERAPY AS APPROPRIATE  [x]   ASSESS BREATH SOUNDS  []   IMPLEMENT AEROSOL/MDI PROTOCOL  [x]   PATIENT EDUCATION AS NEEDED  PROVIDE ADEQUATE OXYGENATION WITH ACCEPTABLE SP02/ABG'S    [x]  IDENTIFY APPROPRIATE OXYGEN THERAPY  [x]   MONITOR SP02/ABG'S AS NEEDED   [x]   PATIENT EDUCATION AS NEEDED    
  Problem: Respiratory - Adult  Goal: Achieves optimal ventilation and oxygenation  12/18/2023 1454 by Francisco J Henriquez, JULIO  Outcome: Progressing   BRONCHOSPASM/BRONCHOCONSTRICTION     [x]         IMPROVE AERATION/BREATH SOUNDS  [x]   ADMINISTER BRONCHODILATOR THERAPY AS APPROPRIATE  [x]   ASSESS BREATH SOUNDS  []   IMPLEMENT AEROSOL/MDI PROTOCOL  [x]   PATIENT EDUCATION AS NEEDED     
  Problem: Respiratory - Adult  Goal: Achieves optimal ventilation and oxygenation  12/18/2023 2156 by Cheyenne Joe, JULIO  Outcome: Progressing   BRONCHOSPASM/BRONCHOCONSTRICTION     [x]         IMPROVE AERATION/BREATH SOUNDS  [x]   ADMINISTER BRONCHODILATOR THERAPY AS APPROPRIATE  [x]   ASSESS BREATH SOUNDS  []   IMPLEMENT AEROSOL/MDI PROTOCOL  [x]   PATIENT EDUCATION AS NEEDED  PROVIDE ADEQUATE OXYGENATION WITH ACCEPTABLE SP02/ABG'S    [x]  IDENTIFY APPROPRIATE OXYGEN THERAPY  [x]   MONITOR SP02/ABG'S AS NEEDED   [x]   PATIENT EDUCATION AS NEEDED    
  Problem: Respiratory - Adult  Goal: Achieves optimal ventilation and oxygenation  12/22/2023 0857 by Mary Mcneal RCP  Outcome: Progressing   BRONCHOSPASM/BRONCHOCONSTRICTION     [x]         IMPROVE AERATION/BREATH SOUNDS  [x]   ADMINISTER BRONCHODILATOR THERAPY AS APPROPRIATE  [x]   ASSESS BREATH SOUNDS  [x]   IMPLEMENT AEROSOL/MDI PROTOCOL  [x]   PATIENT EDUCATION AS NEEDED   PROVIDE ADEQUATE OXYGENATION WITH ACCEPTABLE SP02/ABG'S    [x]  IDENTIFY APPROPRIATE OXYGEN THERAPY  [x]   MONITOR SP02/ABG'S AS NEEDED   [x]   PATIENT EDUCATION AS NEEDED     
  Problem: Respiratory - Adult  Goal: Achieves optimal ventilation and oxygenation  12/23/2023 0732 by Violet Wilder RCP  Flowsheets (Taken 12/23/2023 0732)  Achieves optimal ventilation and oxygenation: Respiratory therapy support as indicated     
  Problem: Respiratory - Adult  Goal: Achieves optimal ventilation and oxygenation  12/24/2023 0335 by Genesis Goddard RCP  Outcome: Progressing   PROVIDE ADEQUATE OXYGENATION WITH ACCEPTABLE SP02/ABG'S    [x]  IDENTIFY APPROPRIATE OXYGEN THERAPY  [x]   MONITOR SP02/ABG'S AS NEEDED   [x]   PATIENT EDUCATION AS NEEDED  Problem: OXYGENATION/RESPIRATORY FUNCTION  Goal: Patient will maintain patent airway  Outcome: Ongoing  Goal: Patient will achieve/maintain normal respiratory rate/effort  Respiratory rate and effort will be within normal limits for the patient  Outcome: Ongoing    Problem: MECHANICAL VENTILATION  Goal: Patient will maintain patent airway  Outcome: Ongoing  Goal: Oral health is maintained or improved  Outcome: Ongoing  Goal: ET tube will be managed safely  Outcome: Ongoing  Goal: Ability to express needs and understand communication  Outcome: Ongoing  Goal: Mobility/activity is maintained at optimum level for patient  Outcome: Ongoing    Problem: ASPIRATION PRECAUTIONS  Goal: Patient’s risk of aspiration is minimized  Outcome: Ongoing    Problem: SKIN INTEGRITY  Goal: Skin integrity is maintained or improved  Outcome: Ongoing                    
  Problem: Respiratory - Adult  Goal: Achieves optimal ventilation and oxygenation  12/24/2023 0936 by Tammy Oakley RCP  Outcome: Progressing     
  Problem: Respiratory - Adult  Goal: Achieves optimal ventilation and oxygenation  12/24/2023 2046 by Genesis Goddard RCP  Outcome: Progressing   PROVIDE ADEQUATE OXYGENATION WITH ACCEPTABLE SP02/ABG'S    [x]  IDENTIFY APPROPRIATE OXYGEN THERAPY  [x]   MONITOR SP02/ABG'S AS NEEDED   [x]   PATIENT EDUCATION AS NEEDED  Problem: OXYGENATION/RESPIRATORY FUNCTION  Goal: Patient will maintain patent airway  Outcome: Ongoing  Goal: Patient will achieve/maintain normal respiratory rate/effort  Respiratory rate and effort will be within normal limits for the patient  Outcome: Ongoing    Problem: MECHANICAL VENTILATION  Goal: Patient will maintain patent airway  Outcome: Ongoing  Goal: Oral health is maintained or improved  Outcome: Ongoing  Goal: ET tube will be managed safely  Outcome: Ongoing  Goal: Ability to express needs and understand communication  Outcome: Ongoing  Goal: Mobility/activity is maintained at optimum level for patient  Outcome: Ongoing    Problem: ASPIRATION PRECAUTIONS  Goal: Patient’s risk of aspiration is minimized  Outcome: Ongoing    Problem: SKIN INTEGRITY  Goal: Skin integrity is maintained or improved  Outcome: Ongoing                    
  Problem: Respiratory - Adult  Goal: Achieves optimal ventilation and oxygenation  12/25/2023 0857 by Corazon Hdez RCP  Outcome: Progressing   BRONCHOSPASM/BRONCHOCONSTRICTION     [x]         IMPROVE AERATION/BREATH SOUNDS  [x]   ADMINISTER BRONCHODILATOR THERAPY AS APPROPRIATE  [x]   ASSESS BREATH SOUNDS  []   IMPLEMENT AEROSOL/MDI PROTOCOL  [x]   PATIENT EDUCATION AS NEEDED  Problem: OXYGENATION/RESPIRATORY FUNCTION  Goal: Patient will maintain patent airway  Outcome: Ongoing  Goal: Patient will achieve/maintain normal respiratory rate/effort  Respiratory rate and effort will be within normal limits for the patient  Outcome: Ongoing    Problem: MECHANICAL VENTILATION  Goal: Patient will maintain patent airway  Outcome: Ongoing  Goal: Oral health is maintained or improved  Outcome: Ongoing  Goal: ET tube will be managed safely  Outcome: Ongoing  Goal: Ability to express needs and understand communication  Outcome: Ongoing  Goal: Mobility/activity is maintained at optimum level for patient  Outcome: Ongoing    Problem: ASPIRATION PRECAUTIONS  Goal: Patient’s risk of aspiration is minimized  Outcome: Ongoing    Problem: SKIN INTEGRITY  Goal: Skin integrity is maintained or improved  Outcome: Ongoing                    
  Problem: Respiratory - Adult  Goal: Achieves optimal ventilation and oxygenation  Outcome: Progressing   BRONCHOSPASM/BRONCHOCONSTRICTION     [x]         IMPROVE AERATION/BREATH SOUNDS  [x]   ADMINISTER BRONCHODILATOR THERAPY AS APPROPRIATE  [x]   ASSESS BREATH SOUNDS  []   IMPLEMENT AEROSOL/MDI PROTOCOL  [x]   PATIENT EDUCATION AS NEEDED    
  Problem: Safety - Adult  Goal: Free from fall injury  1/2/2024 0054 by Edwin Gaming RN  Outcome: Progressing     Problem: Discharge Planning  Goal: Discharge to home or other facility with appropriate resources  1/2/2024 0054 by Edwin Gaming RN  Outcome: Progressing     Problem: Pain  Goal: Verbalizes/displays adequate comfort level or baseline comfort level  1/2/2024 0054 by Edwin Gaming RN  Outcome: Progressing     Problem: Nutrition Deficit:  Goal: Optimize nutritional status  1/2/2024 0054 by Edwin Gaming RN  Outcome: Progressing     Problem: Skin/Tissue Integrity  Goal: Absence of new skin breakdown  Description: 1.  Monitor for areas of redness and/or skin breakdown  2.  Assess vascular access sites hourly  3.  Every 4-6 hours minimum:  Change oxygen saturation probe site  4.  Every 4-6 hours:  If on nasal continuous positive airway pressure, respiratory therapy assess nares and determine need for appliance change or resting period.  1/2/2024 0054 by Edwin Gaming RN  Outcome: Progressing     Problem: Confusion  Goal: Confusion, delirium, dementia, or psychosis is improved or at baseline  Description: INTERVENTIONS:  1. Assess for possible contributors to thought disturbance, including medications, impaired vision or hearing, underlying metabolic abnormalities, dehydration, psychiatric diagnoses, and notify attending LIP  2. [place] high risk fall precautions, as indicated  3. Provide frequent short contacts to provide reality reorientation, refocusing and direction  4. Decrease environmental stimuli, including noise as appropriate  5. Monitor and intervene to maintain adequate nutrition, hydration, elimination, sleep and activity  6. If unable to ensure safety without constant attention obtain sitter and review sitter guidelines with assigned personnel  7. Initiate Psychosocial CNS and Spiritual Care consult, as indicated  1/2/2024 0054 by Edwin Gaming RN  Outcome: 
  Problem: Safety - Adult  Goal: Free from fall injury  1/2/2024 1017 by Kamar Robles RN  Outcome: Progressing  1/2/2024 0054 by Edwin Gaming RN  Outcome: Progressing     Problem: Discharge Planning  Goal: Discharge to home or other facility with appropriate resources  1/2/2024 1017 by Kamar Robles RN  Outcome: Progressing  1/2/2024 0054 by Edwin Gaming RN  Outcome: Progressing     Problem: Pain  Goal: Verbalizes/displays adequate comfort level or baseline comfort level  1/2/2024 1017 by Kamar Robles RN  Outcome: Progressing  1/2/2024 0054 by Edwin Gaming RN  Outcome: Progressing     Problem: Nutrition Deficit:  Goal: Optimize nutritional status  1/2/2024 1017 by Kamar Robles RN  Outcome: Progressing  1/2/2024 0054 by Edwin Gaming RN  Outcome: Progressing     Problem: Skin/Tissue Integrity  Goal: Absence of new skin breakdown  Description: 1.  Monitor for areas of redness and/or skin breakdown  2.  Assess vascular access sites hourly  3.  Every 4-6 hours minimum:  Change oxygen saturation probe site  4.  Every 4-6 hours:  If on nasal continuous positive airway pressure, respiratory therapy assess nares and determine need for appliance change or resting period.  1/2/2024 1017 by Kamar Robles RN  Outcome: Progressing  1/2/2024 0054 by Edwin Gaming RN  Outcome: Progressing     Problem: Confusion  Goal: Confusion, delirium, dementia, or psychosis is improved or at baseline  Description: INTERVENTIONS:  1. Assess for possible contributors to thought disturbance, including medications, impaired vision or hearing, underlying metabolic abnormalities, dehydration, psychiatric diagnoses, and notify attending LIP  2. [place] high risk fall precautions, as indicated  3. Provide frequent short contacts to provide reality reorientation, refocusing and direction  4. Decrease environmental stimuli, including noise as appropriate  5. Monitor and intervene to maintain adequate 
  Problem: Safety - Adult  Goal: Free from fall injury  1/3/2024 2250 by Edwin Gaming RN  Outcome: Progressing     Problem: Discharge Planning  Goal: Discharge to home or other facility with appropriate resources  1/3/2024 2250 by Edwin Gaming RN  Outcome: Progressing     Problem: Pain  Goal: Verbalizes/displays adequate comfort level or baseline comfort level  1/3/2024 2250 by Edwin Gaming RN  Outcome: Completed     Problem: Nutrition Deficit:  Goal: Optimize nutritional status  1/3/2024 2250 by Edwin Gaming RN  Outcome: Progressing     Problem: Skin/Tissue Integrity  Goal: Absence of new skin breakdown  Description: 1.  Monitor for areas of redness and/or skin breakdown  2.  Assess vascular access sites hourly  3.  Every 4-6 hours minimum:  Change oxygen saturation probe site  4.  Every 4-6 hours:  If on nasal continuous positive airway pressure, respiratory therapy assess nares and determine need for appliance change or resting period.  1/3/2024 2250 by Edwin Gaming RN  Outcome: Progressing     Problem: Confusion  Goal: Confusion, delirium, dementia, or psychosis is improved or at baseline  Description: INTERVENTIONS:  1. Assess for possible contributors to thought disturbance, including medications, impaired vision or hearing, underlying metabolic abnormalities, dehydration, psychiatric diagnoses, and notify attending LIP  2. [place] high risk fall precautions, as indicated  3. Provide frequent short contacts to provide reality reorientation, refocusing and direction  4. Decrease environmental stimuli, including noise as appropriate  5. Monitor and intervene to maintain adequate nutrition, hydration, elimination, sleep and activity  6. If unable to ensure safety without constant attention obtain sitter and review sitter guidelines with assigned personnel  7. Initiate Psychosocial CNS and Spiritual Care consult, as indicated  1/3/2024 2250 by Edwin Gaming RN  Outcome: 
  Problem: Safety - Adult  Goal: Free from fall injury  1/7/2024 0539 by Barbara Devine RN  Outcome: Progressing  1/6/2024 1812 by John Calero RN  Outcome: Progressing     Problem: Discharge Planning  Goal: Discharge to home or other facility with appropriate resources  1/7/2024 0539 by Barbara Devine RN  Outcome: Progressing  1/6/2024 1812 by John Calero RN  Outcome: Progressing     Problem: Nutrition Deficit:  Goal: Optimize nutritional status  1/7/2024 0539 by Barbara Devine RN  Outcome: Progressing  1/6/2024 1812 by John Calero RN  Outcome: Progressing     Problem: Skin/Tissue Integrity  Goal: Absence of new skin breakdown  Description: 1.  Monitor for areas of redness and/or skin breakdown  2.  Assess vascular access sites hourly  3.  Every 4-6 hours minimum:  Change oxygen saturation probe site  4.  Every 4-6 hours:  If on nasal continuous positive airway pressure, respiratory therapy assess nares and determine need for appliance change or resting period.  1/7/2024 0539 by Barbara Devine RN  Outcome: Progressing  1/6/2024 1812 by John Calero RN  Outcome: Progressing     Problem: ABCDS Injury Assessment  Goal: Absence of physical injury  1/7/2024 0539 by Barbara Devine RN  Outcome: Progressing  1/6/2024 1812 by John Calero RN  Outcome: Progressing     Problem: Respiratory - Adult  Goal: Achieves optimal ventilation and oxygenation  1/7/2024 0539 by Barbara Devine RN  Outcome: Progressing  1/6/2024 1812 by John Calero RN  Outcome: Progressing     
  Problem: Safety - Adult  Goal: Free from fall injury  1/7/2024 1824 by John Calero RN  Outcome: Progressing     Problem: Discharge Planning  Goal: Discharge to home or other facility with appropriate resources  1/7/2024 1824 by John Calero RN  Outcome: Progressing     Problem: Nutrition Deficit:  Goal: Optimize nutritional status  1/7/2024 1824 by John Calero RN  Outcome: Progressing     Problem: Skin/Tissue Integrity  Goal: Absence of new skin breakdown  Description: 1.  Monitor for areas of redness and/or skin breakdown  2.  Assess vascular access sites hourly  3.  Every 4-6 hours minimum:  Change oxygen saturation probe site  4.  Every 4-6 hours:  If on nasal continuous positive airway pressure, respiratory therapy assess nares and determine need for appliance change or resting period.  1/7/2024 1824 by John Calero RN  Outcome: Progressing     Problem: Respiratory - Adult  Goal: Achieves optimal ventilation and oxygenation  1/7/2024 1824 by John Calero RN  Outcome: Progressing     
  Problem: Safety - Adult  Goal: Free from fall injury  1/8/2024 0352 by Barbara Devine RN  Outcome: Progressing  1/7/2024 1824 by John Calero RN  Outcome: Progressing     Problem: Discharge Planning  Goal: Discharge to home or other facility with appropriate resources  1/8/2024 0352 by Barbara Devine RN  Outcome: Progressing  1/7/2024 1824 by John Calero RN  Outcome: Progressing     Problem: Nutrition Deficit:  Goal: Optimize nutritional status  1/8/2024 0352 by Barbara Devine RN  Outcome: Progressing  1/7/2024 1824 by John Calero RN  Outcome: Progressing     Problem: Skin/Tissue Integrity  Goal: Absence of new skin breakdown  Description: 1.  Monitor for areas of redness and/or skin breakdown  2.  Assess vascular access sites hourly  3.  Every 4-6 hours minimum:  Change oxygen saturation probe site  4.  Every 4-6 hours:  If on nasal continuous positive airway pressure, respiratory therapy assess nares and determine need for appliance change or resting period.  1/8/2024 0352 by Barbara Devine RN  Outcome: Progressing  1/7/2024 1824 by John Calero RN  Outcome: Progressing     Problem: ABCDS Injury Assessment  Goal: Absence of physical injury  1/8/2024 0352 by Barbara Devine RN  Outcome: Progressing  1/7/2024 1824 by John Calero RN  Outcome: Progressing     Problem: Respiratory - Adult  Goal: Achieves optimal ventilation and oxygenation  1/8/2024 0352 by Barbara Devine RN  Outcome: Progressing  1/7/2024 1824 by John Calero RN  Outcome: Progressing     
  Problem: Safety - Adult  Goal: Free from fall injury  1/8/2024 0929 by Chris Garcia RN  Outcome: Progressing  1/8/2024 0352 by Barbara Devine RN  Outcome: Progressing     Problem: Discharge Planning  Goal: Discharge to home or other facility with appropriate resources  1/8/2024 0929 by Chris Garcia RN  Outcome: Progressing  1/8/2024 0352 by Barbara Devine RN  Outcome: Progressing     Problem: Nutrition Deficit:  Goal: Optimize nutritional status  1/8/2024 0929 by Chris Garcia RN  Outcome: Progressing  1/8/2024 0352 by Barbara Devine RN  Outcome: Progressing     Problem: Skin/Tissue Integrity  Goal: Absence of new skin breakdown  Description: 1.  Monitor for areas of redness and/or skin breakdown  2.  Assess vascular access sites hourly  3.  Every 4-6 hours minimum:  Change oxygen saturation probe site  4.  Every 4-6 hours:  If on nasal continuous positive airway pressure, respiratory therapy assess nares and determine need for appliance change or resting period.  1/8/2024 0929 by Chris Garcia RN  Outcome: Progressing  1/8/2024 0352 by Barbara Devine RN  Outcome: Progressing     Problem: ABCDS Injury Assessment  Goal: Absence of physical injury  1/8/2024 0929 by Chris Garcia RN  Outcome: Progressing  1/8/2024 0352 by Barbara Devine RN  Outcome: Progressing     Problem: Respiratory - Adult  Goal: Achieves optimal ventilation and oxygenation  1/8/2024 0929 by Chris Garcia RN  Outcome: Progressing  1/8/2024 0352 by Barbara Devine RN  Outcome: Progressing     
  Problem: Safety - Adult  Goal: Free from fall injury  12/10/2023 1951 by DHIRAJ TELLO  Outcome: Progressing  12/10/2023 1530 by Marguerite Willoughby, RN  Outcome: Progressing     Problem: Discharge Planning  Goal: Discharge to home or other facility with appropriate resources  12/10/2023 1951 by DHIRAJ TELLO  Outcome: Progressing  12/10/2023 1530 by Marguerite Willoughby, RN  Outcome: Progressing     
  Problem: Safety - Adult  Goal: Free from fall injury  12/13/2023 1333 by Schoen, Tracey, RN  Outcome: Not Progressing  12/13/2023 0040 by DHIRAJ TELLO  Outcome: Progressing     Problem: Discharge Planning  Goal: Discharge to home or other facility with appropriate resources  12/13/2023 1333 by Schoen, Tracey, RN  Outcome: Not Progressing  12/13/2023 0040 by DHIRAJ TELLO  Outcome: Progressing     Problem: Pain  Goal: Verbalizes/displays adequate comfort level or baseline comfort level  Outcome: Progressing     Problem: Nutrition Deficit:  Goal: Optimize nutritional status  12/13/2023 1333 by Schoen, Tracey, RN  Outcome: Not Progressing  12/13/2023 0040 by DHIRAJ TELLO  Outcome: Progressing     Problem: Safety - Adult  Goal: Free from fall injury  12/13/2023 1333 by Schoen, Tracey, RN  Outcome: Not Progressing  12/13/2023 0040 by DHIRAJ TELLO  Outcome: Progressing     Problem: Discharge Planning  Goal: Discharge to home or other facility with appropriate resources  12/13/2023 1333 by Schoen, Tracey, RN  Outcome: Not Progressing  12/13/2023 0040 by DHIRAJ TELLO  Outcome: Progressing     Problem: Nutrition Deficit:  Goal: Optimize nutritional status  12/13/2023 1333 by Schoen, Tracey, RN  Outcome: Not Progressing  12/13/2023 0040 by DHIRAJ TELLO  Outcome: Progressing     
  Problem: Safety - Adult  Goal: Free from fall injury  12/13/2023 2241 by Jeromy Randolph RN  Outcome: Progressing  12/13/2023 1333 by Schoen, Tracey, RN  Outcome: Not Progressing     Problem: Discharge Planning  Goal: Discharge to home or other facility with appropriate resources  12/13/2023 2241 by Jeromy Randolph RN  Outcome: Progressing  12/13/2023 1333 by Schoen, Tracey, RN  Outcome: Not Progressing     Problem: Pain  Goal: Verbalizes/displays adequate comfort level or baseline comfort level  12/13/2023 2241 by Jeromy Randolph RN  Outcome: Progressing  12/13/2023 1333 by Schoen, Tracey, RN  Outcome: Progressing     Problem: Nutrition Deficit:  Goal: Optimize nutritional status  12/13/2023 2241 by Jeromy Randolph RN  Outcome: Progressing  12/13/2023 1333 by Schoen, Tracey, RN  Outcome: Not Progressing     Problem: Safety - Adult  Goal: Free from fall injury  12/13/2023 2241 by Jeromy Randolph RN  Outcome: Progressing  12/13/2023 1333 by Schoen, Tracey, RN  Outcome: Not Progressing     Problem: Discharge Planning  Goal: Discharge to home or other facility with appropriate resources  12/13/2023 2241 by Jeromy Randolph RN  Outcome: Progressing  12/13/2023 1333 by Schoen, Tracey, RN  Outcome: Not Progressing     Problem: Nutrition Deficit:  Goal: Optimize nutritional status  12/13/2023 2241 by Jeromy Randolph RN  Outcome: Progressing  12/13/2023 1333 by Schoen, Tracey, RN  Outcome: Not Progressing     
  Problem: Safety - Adult  Goal: Free from fall injury  12/14/2023 1145 by Schoen, Tracey, RN  Outcome: Progressing  12/13/2023 2241 by Jeromy Randolph RN  Outcome: Progressing     Problem: Discharge Planning  Goal: Discharge to home or other facility with appropriate resources  12/14/2023 1145 by Schoen, Tracey, RN  Outcome: Progressing  12/13/2023 2241 by Jeromy Randolph RN  Outcome: Progressing     Problem: Pain  Goal: Verbalizes/displays adequate comfort level or baseline comfort level  12/14/2023 1145 by Schoen, Tracey, RN  Outcome: Progressing  12/13/2023 2241 by Jeromy Randolph RN  Outcome: Progressing     Problem: Nutrition Deficit:  Goal: Optimize nutritional status  12/14/2023 1145 by Schoen, Tracey, RN  Outcome: Not Progressing  12/13/2023 2241 by Jeromy Randolph RN  Outcome: Progressing     Problem: Nutrition Deficit:  Goal: Optimize nutritional status  12/14/2023 1145 by Schoen, Tracey, RN  Outcome: Not Progressing  12/13/2023 2241 by Jeromy Randolph RN  Outcome: Progressing     
  Problem: Safety - Adult  Goal: Free from fall injury  12/15/2023 1725 by Cat Hodges RN  Outcome: Progressing     Problem: Discharge Planning  Goal: Discharge to home or other facility with appropriate resources  12/15/2023 1725 by Cat Hodges RN  Outcome: Progressing     Problem: Pain  Goal: Verbalizes/displays adequate comfort level or baseline comfort level  12/15/2023 1725 by Cat Hodges RN  Outcome: Progressing     Problem: Nutrition Deficit:  Goal: Optimize nutritional status  12/15/2023 1725 by Cat Hodges RN  Outcome: Progressing     Problem: Skin/Tissue Integrity  Goal: Absence of new skin breakdown  Description: 1.  Monitor for areas of redness and/or skin breakdown  2.  Assess vascular access sites hourly  3.  Every 4-6 hours minimum:  Change oxygen saturation probe site  4.  Every 4-6 hours:  If on nasal continuous positive airway pressure, respiratory therapy assess nares and determine need for appliance change or resting period.  12/15/2023 1725 by Cat Hodges RN  Outcome: Progressing     Problem: Confusion  Goal: Confusion, delirium, dementia, or psychosis is improved or at baseline  Description: INTERVENTIONS:  1. Assess for possible contributors to thought disturbance, including medications, impaired vision or hearing, underlying metabolic abnormalities, dehydration, psychiatric diagnoses, and notify attending LIP  2. [place] high risk fall precautions, as indicated  3. Provide frequent short contacts to provide reality reorientation, refocusing and direction  4. Decrease environmental stimuli, including noise as appropriate  5. Monitor and intervene to maintain adequate nutrition, hydration, elimination, sleep and activity  6. If unable to ensure safety without constant attention obtain sitter and review sitter guidelines with assigned personnel  7. Initiate Psychosocial CNS and Spiritual Care consult, as indicated  12/15/2023 1725 by Cat Hodges RN  Outcome: 
  Problem: Safety - Adult  Goal: Free from fall injury  12/16/2023 0635 by Kojo Alcantar RN  Outcome: Progressing  12/15/2023 1725 by Cat Hodges RN  Outcome: Progressing     Problem: Discharge Planning  Goal: Discharge to home or other facility with appropriate resources  12/16/2023 0635 by Kojo Alcantar RN  Outcome: Progressing  12/15/2023 1725 by Cat Hodges RN  Outcome: Progressing     Problem: Pain  Goal: Verbalizes/displays adequate comfort level or baseline comfort level  12/16/2023 0635 by Kojo Alcantar RN  Outcome: Progressing  12/15/2023 1725 by Cat Hodges RN  Outcome: Progressing     Problem: Nutrition Deficit:  Goal: Optimize nutritional status  12/16/2023 0635 by Kojo Alcantar RN  Outcome: Progressing  12/15/2023 1725 by Cat Hodges RN  Outcome: Progressing     Problem: Skin/Tissue Integrity  Goal: Absence of new skin breakdown  Description: 1.  Monitor for areas of redness and/or skin breakdown  2.  Assess vascular access sites hourly  3.  Every 4-6 hours minimum:  Change oxygen saturation probe site  4.  Every 4-6 hours:  If on nasal continuous positive airway pressure, respiratory therapy assess nares and determine need for appliance change or resting period.  12/16/2023 0635 by Kojo Alcantar RN  Outcome: Progressing  12/15/2023 1725 by Cat Hodges RN  Outcome: Progressing     Problem: Confusion  Goal: Confusion, delirium, dementia, or psychosis is improved or at baseline  Description: INTERVENTIONS:  1. Assess for possible contributors to thought disturbance, including medications, impaired vision or hearing, underlying metabolic abnormalities, dehydration, psychiatric diagnoses, and notify attending LIP  2. [place] high risk fall precautions, as indicated  3. Provide frequent short contacts to provide reality reorientation, refocusing and direction  4. Decrease environmental stimuli, including noise as appropriate  5. Monitor and intervene to maintain 
  Problem: Safety - Adult  Goal: Free from fall injury  12/18/2023 1753 by Wilma Holm RN  Outcome: Progressing  12/18/2023 0431 by Kojo Alcantar RN  Outcome: Progressing     Problem: Discharge Planning  Goal: Discharge to home or other facility with appropriate resources  12/18/2023 1753 by Wilma Holm RN  Outcome: Progressing  12/18/2023 0431 by Kojo Alcantar RN  Outcome: Progressing     Problem: Pain  Goal: Verbalizes/displays adequate comfort level or baseline comfort level  12/18/2023 1753 by Wilma Holm RN  Outcome: Progressing  12/18/2023 0431 by Kojo Alcantar RN  Outcome: Progressing  Flowsheets (Taken 12/17/2023 1600 by Loreto Abbott RN)  Verbalizes/displays adequate comfort level or baseline comfort level: Assess pain using appropriate pain scale     Problem: Nutrition Deficit:  Goal: Optimize nutritional status  12/18/2023 1753 by Wilma Holm RN  Outcome: Progressing  12/18/2023 0431 by Kojo Alcantar RN  Outcome: Progressing     Problem: Skin/Tissue Integrity  Goal: Absence of new skin breakdown  Description: 1.  Monitor for areas of redness and/or skin breakdown  2.  Assess vascular access sites hourly  3.  Every 4-6 hours minimum:  Change oxygen saturation probe site  4.  Every 4-6 hours:  If on nasal continuous positive airway pressure, respiratory therapy assess nares and determine need for appliance change or resting period.  12/18/2023 1753 by Wilma Holm RN  Outcome: Progressing  12/18/2023 0431 by Kojo Alcantar RN  Outcome: Progressing     Problem: Confusion  Goal: Confusion, delirium, dementia, or psychosis is improved or at baseline  Description: INTERVENTIONS:  1. Assess for possible contributors to thought disturbance, including medications, impaired vision or hearing, underlying metabolic abnormalities, dehydration, psychiatric diagnoses, and notify attending LIP  2. [place] high risk fall precautions, as indicated  3. Provide frequent short contacts to provide 
  Problem: Safety - Adult  Goal: Free from fall injury  12/19/2023 0114 by Natalie Coyle RN  Outcome: Progressing  12/18/2023 1753 by Wilma Holm RN  Outcome: Progressing     Problem: Discharge Planning  Goal: Discharge to home or other facility with appropriate resources  12/19/2023 0114 by Natalie Coyle RN  Outcome: Progressing  12/18/2023 1753 by Wilma Holm RN  Outcome: Progressing     Problem: Pain  Goal: Verbalizes/displays adequate comfort level or baseline comfort level  12/19/2023 0114 by Natalie Coyle RN  Outcome: Progressing  12/18/2023 1753 by Wilma Holm RN  Outcome: Progressing     Problem: Nutrition Deficit:  Goal: Optimize nutritional status  12/19/2023 0114 by Natalie Coyle RN  Outcome: Progressing  12/18/2023 1753 by Wilma Holm RN  Outcome: Progressing     Problem: Skin/Tissue Integrity  Goal: Absence of new skin breakdown  Description: 1.  Monitor for areas of redness and/or skin breakdown  2.  Assess vascular access sites hourly  3.  Every 4-6 hours minimum:  Change oxygen saturation probe site  4.  Every 4-6 hours:  If on nasal continuous positive airway pressure, respiratory therapy assess nares and determine need for appliance change or resting period.  12/19/2023 0114 by aNtalie Coyle RN  Outcome: Progressing  12/18/2023 1753 by Wilma Holm RN  Outcome: Progressing     Problem: Confusion  Goal: Confusion, delirium, dementia, or psychosis is improved or at baseline  Description: INTERVENTIONS:  1. Assess for possible contributors to thought disturbance, including medications, impaired vision or hearing, underlying metabolic abnormalities, dehydration, psychiatric diagnoses, and notify attending LIP  2. [place] high risk fall precautions, as indicated  3. Provide frequent short contacts to provide reality reorientation, refocusing and direction  4. Decrease environmental stimuli, including noise as appropriate  5. Monitor and intervene to maintain 
  Problem: Safety - Adult  Goal: Free from fall injury  12/19/2023 0911 by Jaelyn Lee RN  Outcome: Progressing  12/19/2023 0114 by Natalie Coyle RN  Outcome: Progressing     Problem: Discharge Planning  Goal: Discharge to home or other facility with appropriate resources  12/19/2023 0911 by Jaelyn Lee RN  Outcome: Progressing  12/19/2023 0114 by Natalie Coyle RN  Outcome: Progressing     Problem: Pain  Goal: Verbalizes/displays adequate comfort level or baseline comfort level  12/19/2023 0911 by Jaelyn Lee RN  Outcome: Progressing  12/19/2023 0114 by Natalie Coyle RN  Outcome: Progressing     Problem: Nutrition Deficit:  Goal: Optimize nutritional status  12/19/2023 0114 by Natalie Coyle RN  Outcome: Progressing     Problem: Skin/Tissue Integrity  Goal: Absence of new skin breakdown  Description: 1.  Monitor for areas of redness and/or skin breakdown  2.  Assess vascular access sites hourly  3.  Every 4-6 hours minimum:  Change oxygen saturation probe site  4.  Every 4-6 hours:  If on nasal continuous positive airway pressure, respiratory therapy assess nares and determine need for appliance change or resting period.  12/19/2023 0114 by Natalie Coyle RN  Outcome: Progressing     Problem: Confusion  Goal: Confusion, delirium, dementia, or psychosis is improved or at baseline  Description: INTERVENTIONS:  1. Assess for possible contributors to thought disturbance, including medications, impaired vision or hearing, underlying metabolic abnormalities, dehydration, psychiatric diagnoses, and notify attending LIP  2. [place] high risk fall precautions, as indicated  3. Provide frequent short contacts to provide reality reorientation, refocusing and direction  4. Decrease environmental stimuli, including noise as appropriate  5. Monitor and intervene to maintain adequate nutrition, hydration, elimination, sleep and activity  6. If unable to ensure safety without 
  Problem: Safety - Adult  Goal: Free from fall injury  12/21/2023 1152 by Jaelyn Lee RN  Outcome: Progressing  12/20/2023 2246 by Natalie Coyle RN  Outcome: Progressing     Problem: Discharge Planning  Goal: Discharge to home or other facility with appropriate resources  12/21/2023 1152 by Jaelyn Lee RN  Outcome: Progressing  12/20/2023 2246 by Natalie Coyle RN  Outcome: Progressing     Problem: Pain  Goal: Verbalizes/displays adequate comfort level or baseline comfort level  12/21/2023 1152 by Jaelyn Lee RN  Outcome: Progressing  12/20/2023 2246 by Natalie Coyle RN  Outcome: Progressing     Problem: Nutrition Deficit:  Goal: Optimize nutritional status  12/20/2023 2246 by Natalie Coyle RN  Outcome: Progressing     Problem: Skin/Tissue Integrity  Goal: Absence of new skin breakdown  Description: 1.  Monitor for areas of redness and/or skin breakdown  2.  Assess vascular access sites hourly  3.  Every 4-6 hours minimum:  Change oxygen saturation probe site  4.  Every 4-6 hours:  If on nasal continuous positive airway pressure, respiratory therapy assess nares and determine need for appliance change or resting period.  12/20/2023 2246 by Natalie Coyle RN  Outcome: Progressing     Problem: Confusion  Goal: Confusion, delirium, dementia, or psychosis is improved or at baseline  Description: INTERVENTIONS:  1. Assess for possible contributors to thought disturbance, including medications, impaired vision or hearing, underlying metabolic abnormalities, dehydration, psychiatric diagnoses, and notify attending LIP  2. [place] high risk fall precautions, as indicated  3. Provide frequent short contacts to provide reality reorientation, refocusing and direction  4. Decrease environmental stimuli, including noise as appropriate  5. Monitor and intervene to maintain adequate nutrition, hydration, elimination, sleep and activity  6. If unable to ensure safety without 
  Problem: Safety - Adult  Goal: Free from fall injury  12/23/2023 0502 by Shweta Dallas RN  Outcome: Progressing  12/22/2023 1728 by Josey Hayes RN  Outcome: Progressing  Flowsheets (Taken 12/22/2023 1727)  Free From Fall Injury: Instruct family/caregiver on patient safety     Problem: Discharge Planning  Goal: Discharge to home or other facility with appropriate resources  12/23/2023 0502 by Shweta Dallas RN  Outcome: Progressing  12/22/2023 1728 by Josey Hayes RN  Outcome: Progressing  Flowsheets (Taken 12/22/2023 1621)  Discharge to home or other facility with appropriate resources: Identify barriers to discharge with patient and caregiver     Problem: Pain  Goal: Verbalizes/displays adequate comfort level or baseline comfort level  12/23/2023 0502 by Shweta Dallas RN  Outcome: Progressing  12/22/2023 1728 by Josey Hayes RN  Outcome: Progressing  Flowsheets (Taken 12/22/2023 1621)  Verbalizes/displays adequate comfort level or baseline comfort level: Encourage patient to monitor pain and request assistance     Problem: Nutrition Deficit:  Goal: Optimize nutritional status  12/23/2023 0502 by Shweta Dallas RN  Outcome: Progressing  12/22/2023 1728 by Josey Hayes RN  Outcome: Progressing     Problem: Skin/Tissue Integrity  Goal: Absence of new skin breakdown  Description: 1.  Monitor for areas of redness and/or skin breakdown  2.  Assess vascular access sites hourly  3.  Every 4-6 hours minimum:  Change oxygen saturation probe site  4.  Every 4-6 hours:  If on nasal continuous positive airway pressure, respiratory therapy assess nares and determine need for appliance change or resting period.  12/23/2023 0502 by Shweta Dallas RN  Outcome: Progressing  12/22/2023 1728 by Josey Hayes RN  Outcome: Progressing     Problem: Confusion  Goal: Confusion, delirium, dementia, or psychosis is improved or at baseline  Description: INTERVENTIONS:  1. Assess for possible contributors to thought 
  Problem: Safety - Adult  Goal: Free from fall injury  12/23/2023 0504 by Shweta Dallas RN  Outcome: Progressing  12/23/2023 0502 by Shweta Dallas RN  Outcome: Progressing  12/22/2023 1728 by Josey Hayes RN  Outcome: Progressing  Flowsheets (Taken 12/22/2023 1727)  Free From Fall Injury: Instruct family/caregiver on patient safety     Problem: Discharge Planning  Goal: Discharge to home or other facility with appropriate resources  12/23/2023 0504 by Shweta Dallas RN  Outcome: Progressing  12/23/2023 0502 by Shweta Dallas RN  Outcome: Progressing  12/22/2023 1728 by Josey Hayes RN  Outcome: Progressing  Flowsheets (Taken 12/22/2023 1621)  Discharge to home or other facility with appropriate resources: Identify barriers to discharge with patient and caregiver     Problem: Pain  Goal: Verbalizes/displays adequate comfort level or baseline comfort level  12/23/2023 0504 by Shweta Dallas RN  Outcome: Progressing  12/23/2023 0502 by Shweta Dallas RN  Outcome: Progressing  12/22/2023 1728 by Josey Hayes RN  Outcome: Progressing  Flowsheets (Taken 12/22/2023 1621)  Verbalizes/displays adequate comfort level or baseline comfort level: Encourage patient to monitor pain and request assistance     Problem: Nutrition Deficit:  Goal: Optimize nutritional status  12/23/2023 0504 by Shweta Dallas RN  Outcome: Progressing  12/23/2023 0502 by Shweta Dallas RN  Outcome: Progressing  12/22/2023 1728 by Josey Hayes RN  Outcome: Progressing     Problem: Skin/Tissue Integrity  Goal: Absence of new skin breakdown  Description: 1.  Monitor for areas of redness and/or skin breakdown  2.  Assess vascular access sites hourly  3.  Every 4-6 hours minimum:  Change oxygen saturation probe site  4.  Every 4-6 hours:  If on nasal continuous positive airway pressure, respiratory therapy assess nares and determine need for appliance change or resting period.  12/23/2023 0504 by Shweta Dallas RN  Outcome: 
  Problem: Safety - Adult  Goal: Free from fall injury  12/24/2023 1942 by Violet Rea RN  Outcome: Progressing  12/24/2023 1936 by Violet Rea RN  Outcome: Progressing  Flowsheets (Taken 12/24/2023 0800)  Free From Fall Injury: Instruct family/caregiver on patient safety     Problem: Discharge Planning  Goal: Discharge to home or other facility with appropriate resources  12/24/2023 1942 by Violet Rea RN  Outcome: Progressing  12/24/2023 1936 by Violet Rea RN  Outcome: Progressing     Problem: Pain  Goal: Verbalizes/displays adequate comfort level or baseline comfort level  12/24/2023 1942 by Violet Rea RN  Outcome: Progressing  12/24/2023 1936 by Violet Rea RN  Outcome: Progressing     Problem: Nutrition Deficit:  Goal: Optimize nutritional status  12/24/2023 1942 by Violet Rea RN  Outcome: Progressing  12/24/2023 1936 by Violet Rea RN  Outcome: Progressing     Problem: Skin/Tissue Integrity  Goal: Absence of new skin breakdown  Description: 1.  Monitor for areas of redness and/or skin breakdown  2.  Assess vascular access sites hourly  3.  Every 4-6 hours minimum:  Change oxygen saturation probe site  4.  Every 4-6 hours:  If on nasal continuous positive airway pressure, respiratory therapy assess nares and determine need for appliance change or resting period.  12/24/2023 1942 by Violet Rea RN  Outcome: Progressing  12/24/2023 1936 by Violet Rea RN  Outcome: Progressing     Problem: Confusion  Goal: Confusion, delirium, dementia, or psychosis is improved or at baseline  Description: INTERVENTIONS:  1. Assess for possible contributors to thought disturbance, including medications, impaired vision or hearing, underlying metabolic abnormalities, dehydration, psychiatric diagnoses, and notify attending LIP  2. [place] high risk fall precautions, as indicated  3. Provide frequent short contacts to provide reality reorientation, refocusing and direction  4. Decrease environmental stimuli, including noise as 
  Problem: Safety - Adult  Goal: Free from fall injury  12/25/2023 1010 by Violet Garcia RN  Outcome: Progressing  Flowsheets (Taken 12/25/2023 0725 by Iris Vera RN)  Free From Fall Injury: Instruct family/caregiver on patient safety  12/25/2023 0724 by Iris Vera RN  Outcome: Progressing     Problem: Discharge Planning  Goal: Discharge to home or other facility with appropriate resources  12/25/2023 1010 by Violet Garcia RN  Outcome: Progressing  12/25/2023 0724 by Iris Vera RN  Outcome: Progressing  Flowsheets (Taken 12/24/2023 2000)  Discharge to home or other facility with appropriate resources: Identify barriers to discharge with patient and caregiver     Problem: Pain  Goal: Verbalizes/displays adequate comfort level or baseline comfort level  12/25/2023 1010 by Violet Garcia RN  Outcome: Progressing  12/25/2023 0724 by Iris Vera RN  Outcome: Progressing     Problem: Nutrition Deficit:  Goal: Optimize nutritional status  12/25/2023 1010 by Violet Garcia RN  Outcome: Progressing  12/25/2023 0724 by Iris Vera RN  Outcome: Progressing     Problem: Skin/Tissue Integrity  Goal: Absence of new skin breakdown  Description: 1.  Monitor for areas of redness and/or skin breakdown  2.  Assess vascular access sites hourly  3.  Every 4-6 hours minimum:  Change oxygen saturation probe site  4.  Every 4-6 hours:  If on nasal continuous positive airway pressure, respiratory therapy assess nares and determine need for appliance change or resting period.  12/25/2023 1010 by Violet Garcia RN  Outcome: Progressing  12/25/2023 0724 by Iris Vera RN  Outcome: Progressing     Problem: Confusion  Goal: Confusion, delirium, dementia, or psychosis is improved or at baseline  Description: INTERVENTIONS:  1. Assess for possible contributors to thought disturbance, including medications, impaired vision or hearing, 
  Problem: Safety - Adult  Goal: Free from fall injury  12/31/2023 2348 by Jenn Silverman RN  Outcome: Progressing  12/31/2023 1825 by Alem Ann RN  Outcome: Progressing     Problem: Discharge Planning  Goal: Discharge to home or other facility with appropriate resources  12/31/2023 2348 by Jenn Silverman RN  Outcome: Progressing  12/31/2023 1825 by Alem Ann RN  Outcome: Progressing     Problem: Pain  Goal: Verbalizes/displays adequate comfort level or baseline comfort level  12/31/2023 2348 by Jenn Silverman RN  Outcome: Progressing  12/31/2023 1825 by Alem Ann RN  Outcome: Progressing     Problem: Nutrition Deficit:  Goal: Optimize nutritional status  12/31/2023 2348 by Jenn Silverman RN  Outcome: Progressing  12/31/2023 1825 by Alem Ann RN  Outcome: Progressing     Problem: Skin/Tissue Integrity  Goal: Absence of new skin breakdown  Description: 1.  Monitor for areas of redness and/or skin breakdown  2.  Assess vascular access sites hourly  3.  Every 4-6 hours minimum:  Change oxygen saturation probe site  4.  Every 4-6 hours:  If on nasal continuous positive airway pressure, respiratory therapy assess nares and determine need for appliance change or resting period.  12/31/2023 2348 by Jenn Silverman RN  Outcome: Progressing  12/31/2023 1825 by Alem Ann RN  Outcome: Progressing     Problem: Confusion  Goal: Confusion, delirium, dementia, or psychosis is improved or at baseline  Description: INTERVENTIONS:  1. Assess for possible contributors to thought disturbance, including medications, impaired vision or hearing, underlying metabolic abnormalities, dehydration, psychiatric diagnoses, and notify attending LIP  2. [place] high risk fall precautions, as indicated  3. Provide frequent short contacts to provide reality reorientation, refocusing and direction  4. Decrease environmental stimuli, including noise as appropriate  5. Monitor and intervene to maintain adequate 
  Problem: Safety - Adult  Goal: Free from fall injury  Outcome: Not Progressing     Problem: Discharge Planning  Goal: Discharge to home or other facility with appropriate resources  Outcome: Not Progressing     Problem: Pain  Goal: Verbalizes/displays adequate comfort level or baseline comfort level  Outcome: Not Progressing     Problem: Nutrition Deficit:  Goal: Optimize nutritional status  Outcome: Not Progressing     Problem: Skin/Tissue Integrity  Goal: Absence of new skin breakdown  Description: 1.  Monitor for areas of redness and/or skin breakdown  2.  Assess vascular access sites hourly  3.  Every 4-6 hours minimum:  Change oxygen saturation probe site  4.  Every 4-6 hours:  If on nasal continuous positive airway pressure, respiratory therapy assess nares and determine need for appliance change or resting period.  Outcome: Not Progressing     Problem: Confusion  Goal: Confusion, delirium, dementia, or psychosis is improved or at baseline  Description: INTERVENTIONS:  1. Assess for possible contributors to thought disturbance, including medications, impaired vision or hearing, underlying metabolic abnormalities, dehydration, psychiatric diagnoses, and notify attending LIP  2. [place] high risk fall precautions, as indicated  3. Provide frequent short contacts to provide reality reorientation, refocusing and direction  4. Decrease environmental stimuli, including noise as appropriate  5. Monitor and intervene to maintain adequate nutrition, hydration, elimination, sleep and activity  6. If unable to ensure safety without constant attention obtain sitter and review sitter guidelines with assigned personnel  7. Initiate Psychosocial CNS and Spiritual Care consult, as indicated  Outcome: Not Progressing     Problem: ABCDS Injury Assessment  Goal: Absence of physical injury  Outcome: Not Progressing     Problem: Respiratory - Adult  Goal: Achieves optimal ventilation and oxygenation  12/23/2023 1930 by Rona 
  Problem: Safety - Adult  Goal: Free from fall injury  Outcome: Progressing     Problem: Discharge Planning  Goal: Discharge to home or other facility with appropriate resources  Outcome: Progressing     
  Problem: Safety - Adult  Goal: Free from fall injury  Outcome: Progressing     Problem: Discharge Planning  Goal: Discharge to home or other facility with appropriate resources  Outcome: Progressing     
  Problem: Safety - Adult  Goal: Free from fall injury  Outcome: Progressing     Problem: Discharge Planning  Goal: Discharge to home or other facility with appropriate resources  Outcome: Progressing     Problem: Nutrition Deficit:  Goal: Optimize nutritional status  Outcome: Progressing     
  Problem: Safety - Adult  Goal: Free from fall injury  Outcome: Progressing     Problem: Discharge Planning  Goal: Discharge to home or other facility with appropriate resources  Outcome: Progressing     Problem: Nutrition Deficit:  Goal: Optimize nutritional status  Outcome: Progressing     Problem: Skin/Tissue Integrity  Goal: Absence of new skin breakdown  Description: 1.  Monitor for areas of redness and/or skin breakdown  2.  Assess vascular access sites hourly  3.  Every 4-6 hours minimum:  Change oxygen saturation probe site  4.  Every 4-6 hours:  If on nasal continuous positive airway pressure, respiratory therapy assess nares and determine need for appliance change or resting period.  Outcome: Progressing     Problem: ABCDS Injury Assessment  Goal: Absence of physical injury  Outcome: Progressing     Problem: Respiratory - Adult  Goal: Achieves optimal ventilation and oxygenation  Outcome: Progressing     
  Problem: Safety - Adult  Goal: Free from fall injury  Outcome: Progressing     Problem: Discharge Planning  Goal: Discharge to home or other facility with appropriate resources  Outcome: Progressing     Problem: Pain  Goal: Verbalizes/displays adequate comfort level or baseline comfort level  Outcome: Progressing     Problem: Nutrition Deficit:  Goal: Optimize nutritional status  Outcome: Progressing     Problem: Skin/Tissue Integrity  Goal: Absence of new skin breakdown  Description: 1.  Monitor for areas of redness and/or skin breakdown  2.  Assess vascular access sites hourly  3.  Every 4-6 hours minimum:  Change oxygen saturation probe site  4.  Every 4-6 hours:  If on nasal continuous positive airway pressure, respiratory therapy assess nares and determine need for appliance change or resting period.  Outcome: Progressing     Problem: Confusion  Goal: Confusion, delirium, dementia, or psychosis is improved or at baseline  Description: INTERVENTIONS:  1. Assess for possible contributors to thought disturbance, including medications, impaired vision or hearing, underlying metabolic abnormalities, dehydration, psychiatric diagnoses, and notify attending LIP  2. [place] high risk fall precautions, as indicated  3. Provide frequent short contacts to provide reality reorientation, refocusing and direction  4. Decrease environmental stimuli, including noise as appropriate  5. Monitor and intervene to maintain adequate nutrition, hydration, elimination, sleep and activity  6. If unable to ensure safety without constant attention obtain sitter and review sitter guidelines with assigned personnel  7. Initiate Psychosocial CNS and Spiritual Care consult, as indicated  Outcome: Progressing     
  Problem: Safety - Adult  Goal: Free from fall injury  Outcome: Progressing     Problem: Discharge Planning  Goal: Discharge to home or other facility with appropriate resources  Outcome: Progressing     Problem: Pain  Goal: Verbalizes/displays adequate comfort level or baseline comfort level  Outcome: Progressing     Problem: Nutrition Deficit:  Goal: Optimize nutritional status  Outcome: Progressing     Problem: Skin/Tissue Integrity  Goal: Absence of new skin breakdown  Description: 1.  Monitor for areas of redness and/or skin breakdown  2.  Assess vascular access sites hourly  3.  Every 4-6 hours minimum:  Change oxygen saturation probe site  4.  Every 4-6 hours:  If on nasal continuous positive airway pressure, respiratory therapy assess nares and determine need for appliance change or resting period.  Outcome: Progressing     Problem: Confusion  Goal: Confusion, delirium, dementia, or psychosis is improved or at baseline  Description: INTERVENTIONS:  1. Assess for possible contributors to thought disturbance, including medications, impaired vision or hearing, underlying metabolic abnormalities, dehydration, psychiatric diagnoses, and notify attending LIP  2. [place] high risk fall precautions, as indicated  3. Provide frequent short contacts to provide reality reorientation, refocusing and direction  4. Decrease environmental stimuli, including noise as appropriate  5. Monitor and intervene to maintain adequate nutrition, hydration, elimination, sleep and activity  6. If unable to ensure safety without constant attention obtain sitter and review sitter guidelines with assigned personnel  7. Initiate Psychosocial CNS and Spiritual Care consult, as indicated  Outcome: Progressing     Problem: ABCDS Injury Assessment  Goal: Absence of physical injury  Outcome: Progressing     Problem: Respiratory - Adult  Goal: Achieves optimal ventilation and oxygenation  Outcome: Progressing     
  Problem: Safety - Adult  Goal: Free from fall injury  Outcome: Progressing     Problem: Discharge Planning  Goal: Discharge to home or other facility with appropriate resources  Outcome: Progressing     Problem: Pain  Goal: Verbalizes/displays adequate comfort level or baseline comfort level  Outcome: Progressing     Problem: Nutrition Deficit:  Goal: Optimize nutritional status  Outcome: Progressing     Problem: Skin/Tissue Integrity  Goal: Absence of new skin breakdown  Description: 1.  Monitor for areas of redness and/or skin breakdown  2.  Assess vascular access sites hourly  3.  Every 4-6 hours minimum:  Change oxygen saturation probe site  4.  Every 4-6 hours:  If on nasal continuous positive airway pressure, respiratory therapy assess nares and determine need for appliance change or resting period.  Outcome: Progressing     Problem: Confusion  Goal: Confusion, delirium, dementia, or psychosis is improved or at baseline  Description: INTERVENTIONS:  1. Assess for possible contributors to thought disturbance, including medications, impaired vision or hearing, underlying metabolic abnormalities, dehydration, psychiatric diagnoses, and notify attending LIP  2. [place] high risk fall precautions, as indicated  3. Provide frequent short contacts to provide reality reorientation, refocusing and direction  4. Decrease environmental stimuli, including noise as appropriate  5. Monitor and intervene to maintain adequate nutrition, hydration, elimination, sleep and activity  6. If unable to ensure safety without constant attention obtain sitter and review sitter guidelines with assigned personnel  7. Initiate Psychosocial CNS and Spiritual Care consult, as indicated  Outcome: Progressing     Problem: ABCDS Injury Assessment  Goal: Absence of physical injury  Outcome: Progressing     Problem: Respiratory - Adult  Goal: Achieves optimal ventilation and oxygenation  Outcome: Progressing     
  Problem: Safety - Adult  Goal: Free from fall injury  Outcome: Progressing     Problem: Discharge Planning  Goal: Discharge to home or other facility with appropriate resources  Outcome: Progressing     Problem: Pain  Goal: Verbalizes/displays adequate comfort level or baseline comfort level  Outcome: Progressing     Problem: Nutrition Deficit:  Goal: Optimize nutritional status  Outcome: Progressing     Problem: Skin/Tissue Integrity  Goal: Absence of new skin breakdown  Description: 1.  Monitor for areas of redness and/or skin breakdown  2.  Assess vascular access sites hourly  3.  Every 4-6 hours minimum:  Change oxygen saturation probe site  4.  Every 4-6 hours:  If on nasal continuous positive airway pressure, respiratory therapy assess nares and determine need for appliance change or resting period.  Outcome: Progressing     Problem: Confusion  Goal: Confusion, delirium, dementia, or psychosis is improved or at baseline  Description: INTERVENTIONS:  1. Assess for possible contributors to thought disturbance, including medications, impaired vision or hearing, underlying metabolic abnormalities, dehydration, psychiatric diagnoses, and notify attending LIP  2. [place] high risk fall precautions, as indicated  3. Provide frequent short contacts to provide reality reorientation, refocusing and direction  4. Decrease environmental stimuli, including noise as appropriate  5. Monitor and intervene to maintain adequate nutrition, hydration, elimination, sleep and activity  6. If unable to ensure safety without constant attention obtain sitter and review sitter guidelines with assigned personnel  7. Initiate Psychosocial CNS and Spiritual Care consult, as indicated  Outcome: Progressing     Problem: ABCDS Injury Assessment  Goal: Absence of physical injury  Outcome: Progressing     Problem: Respiratory - Adult  Goal: Achieves optimal ventilation and oxygenation  Outcome: Progressing     
  Problem: Safety - Adult  Goal: Free from fall injury  Outcome: Progressing     Problem: Discharge Planning  Goal: Discharge to home or other facility with appropriate resources  Outcome: Progressing     Problem: Pain  Goal: Verbalizes/displays adequate comfort level or baseline comfort level  Outcome: Progressing     Problem: Nutrition Deficit:  Goal: Optimize nutritional status  Outcome: Progressing     Problem: Skin/Tissue Integrity  Goal: Absence of new skin breakdown  Description: 1.  Monitor for areas of redness and/or skin breakdown  2.  Assess vascular access sites hourly  3.  Every 4-6 hours minimum:  Change oxygen saturation probe site  4.  Every 4-6 hours:  If on nasal continuous positive airway pressure, respiratory therapy assess nares and determine need for appliance change or resting period.  Outcome: Progressing     Problem: Confusion  Goal: Confusion, delirium, dementia, or psychosis is improved or at baseline  Description: INTERVENTIONS:  1. Assess for possible contributors to thought disturbance, including medications, impaired vision or hearing, underlying metabolic abnormalities, dehydration, psychiatric diagnoses, and notify attending LIP  2. [place] high risk fall precautions, as indicated  3. Provide frequent short contacts to provide reality reorientation, refocusing and direction  4. Decrease environmental stimuli, including noise as appropriate  5. Monitor and intervene to maintain adequate nutrition, hydration, elimination, sleep and activity  6. If unable to ensure safety without constant attention obtain sitter and review sitter guidelines with assigned personnel  7. Initiate Psychosocial CNS and Spiritual Care consult, as indicated  Outcome: Progressing     Problem: ABCDS Injury Assessment  Goal: Absence of physical injury  Outcome: Progressing     Problem: Respiratory - Adult  Goal: Achieves optimal ventilation and oxygenation  Outcome: Progressing     
  Problem: Safety - Adult  Goal: Free from fall injury  Outcome: Progressing     Problem: Pain  Goal: Verbalizes/displays adequate comfort level or baseline comfort level  Outcome: Progressing  Flowsheets  Taken 12/15/2023 0400  Verbalizes/displays adequate comfort level or baseline comfort level: Assess pain using appropriate pain scale  Taken 12/15/2023 0000  Verbalizes/displays adequate comfort level or baseline comfort level: Assess pain using appropriate pain scale  Taken 12/14/2023 2000  Verbalizes/displays adequate comfort level or baseline comfort level: Assess pain using appropriate pain scale     Problem: Nutrition Deficit:  Goal: Optimize nutritional status  Outcome: Progressing     Problem: Skin/Tissue Integrity  Goal: Absence of new skin breakdown  Description: 1.  Monitor for areas of redness and/or skin breakdown  2.  Assess vascular access sites hourly  3.  Every 4-6 hours minimum:  Change oxygen saturation probe site  4.  Every 4-6 hours:  If on nasal continuous positive airway pressure, respiratory therapy assess nares and determine need for appliance change or resting period.  Outcome: Progressing     Problem: ABCDS Injury Assessment  Goal: Absence of physical injury  Outcome: Progressing     Problem: Confusion  Goal: Confusion, delirium, dementia, or psychosis is improved or at baseline  Description: INTERVENTIONS:  1. Assess for possible contributors to thought disturbance, including medications, impaired vision or hearing, underlying metabolic abnormalities, dehydration, psychiatric diagnoses, and notify attending LIP  2. [place] high risk fall precautions, as indicated  3. Provide frequent short contacts to provide reality reorientation, refocusing and direction  4. Decrease environmental stimuli, including noise as appropriate  5. Monitor and intervene to maintain adequate nutrition, hydration, elimination, sleep and activity  6. If unable to ensure safety without constant attention obtain 
  Problem: Safety - Adult  Goal: Free from fall injury  Outcome: Progressing  Flowsheets (Taken 12/21/2023 2300)  Free From Fall Injury: Instruct family/caregiver on patient safety     Problem: Discharge Planning  Goal: Discharge to home or other facility with appropriate resources  Outcome: Progressing  Flowsheets (Taken 12/21/2023 2300)  Discharge to home or other facility with appropriate resources: Identify barriers to discharge with patient and caregiver     Problem: Pain  Goal: Verbalizes/displays adequate comfort level or baseline comfort level  Outcome: Progressing     Problem: Nutrition Deficit:  Goal: Optimize nutritional status  Outcome: Progressing     Problem: Skin/Tissue Integrity  Goal: Absence of new skin breakdown  Description: 1.  Monitor for areas of redness and/or skin breakdown  2.  Assess vascular access sites hourly  3.  Every 4-6 hours minimum:  Change oxygen saturation probe site  4.  Every 4-6 hours:  If on nasal continuous positive airway pressure, respiratory therapy assess nares and determine need for appliance change or resting period.  Outcome: Progressing     Problem: ABCDS Injury Assessment  Goal: Absence of physical injury  Outcome: Progressing  Flowsheets (Taken 12/21/2023 2300)  Absence of Physical Injury: Implement safety measures based on patient assessment     Problem: Respiratory - Adult  Goal: Achieves optimal ventilation and oxygenation  Outcome: Progressing  Flowsheets (Taken 12/21/2023 2300)  Achieves optimal ventilation and oxygenation: Assess for changes in respiratory status     
  Problem: Safety - Adult  Goal: Free from fall injury  Outcome: Progressing  Flowsheets (Taken 12/24/2023 0800)  Free From Fall Injury: Instruct family/caregiver on patient safety     Problem: Discharge Planning  Goal: Discharge to home or other facility with appropriate resources  Outcome: Progressing     Problem: Pain  Goal: Verbalizes/displays adequate comfort level or baseline comfort level  Outcome: Progressing     Problem: Nutrition Deficit:  Goal: Optimize nutritional status  Outcome: Progressing     Problem: Skin/Tissue Integrity  Goal: Absence of new skin breakdown  Description: 1.  Monitor for areas of redness and/or skin breakdown  2.  Assess vascular access sites hourly  3.  Every 4-6 hours minimum:  Change oxygen saturation probe site  4.  Every 4-6 hours:  If on nasal continuous positive airway pressure, respiratory therapy assess nares and determine need for appliance change or resting period.  Outcome: Progressing     Problem: Confusion  Goal: Confusion, delirium, dementia, or psychosis is improved or at baseline  Description: INTERVENTIONS:  1. Assess for possible contributors to thought disturbance, including medications, impaired vision or hearing, underlying metabolic abnormalities, dehydration, psychiatric diagnoses, and notify attending LIP  2. [place] high risk fall precautions, as indicated  3. Provide frequent short contacts to provide reality reorientation, refocusing and direction  4. Decrease environmental stimuli, including noise as appropriate  5. Monitor and intervene to maintain adequate nutrition, hydration, elimination, sleep and activity  6. If unable to ensure safety without constant attention obtain sitter and review sitter guidelines with assigned personnel  7. Initiate Psychosocial CNS and Spiritual Care consult, as indicated  Outcome: Progressing     Problem: ABCDS Injury Assessment  Goal: Absence of physical injury  Outcome: Progressing  Flowsheets (Taken 12/24/2023 
  Problem: Safety - Medical Restraint  Goal: Remains free of injury from restraints (Restraint for Interference with Medical Device)  Description: INTERVENTIONS:  1. Determine that other, less restrictive measures have been tried or would not be effective before applying the restraint  2. Evaluate the patient's condition at the time of restraint application  3. Inform patient/family regarding the reason for restraint  4. Q2H: Monitor safety, psychosocial status, comfort, nutrition and hydration  12/20/2023 1137 by Jaelyn Lee RN  Outcome: Completed  12/20/2023 1101 by Jaelyn Lee RN  Outcome: Progressing  Flowsheets  Taken 12/20/2023 1000 by Jaelyn Lee RN  Remains free of injury from restraints (restraint for interference with medical device): Every 2 hours: Monitor safety, psychosocial status, comfort, nutrition and hydration  Taken 12/20/2023 0800 by Jaelyn Lee RN  Remains free of injury from restraints (restraint for interference with medical device): Every 2 hours: Monitor safety, psychosocial status, comfort, nutrition and hydration  Taken 12/20/2023 0600 by Natalie Coyle RN  Remains free of injury from restraints (restraint for interference with medical device):   Determine that other, less restrictive measures have been tried or would not be effective before applying the restraint   Evaluate the patient's condition at the time of restraint application   Inform patient/family regarding the reason for restraint   Every 2 hours: Monitor safety, psychosocial status, comfort, nutrition and hydration  Taken 12/20/2023 0400 by Natalie Coyle RN  Remains free of injury from restraints (restraint for interference with medical device):   Determine that other, less restrictive measures have been tried or would not be effective before applying the restraint   Evaluate the patient's condition at the time of restraint application   Inform patient/family regarding the reason for 
  Problem: Safety - Medical Restraint  Goal: Remains free of injury from restraints (Restraint for Interference with Medical Device)  Description: INTERVENTIONS:  1. Determine that other, less restrictive measures have been tried or would not be effective before applying the restraint  2. Evaluate the patient's condition at the time of restraint application  3. Inform patient/family regarding the reason for restraint  4. Q2H: Monitor safety, psychosocial status, comfort, nutrition and hydration  Outcome: Progressing  Flowsheets  Taken 12/20/2023 1912 by Jaelyn Lee RN  Remains free of injury from restraints (restraint for interference with medical device): Every 2 hours: Monitor safety, psychosocial status, comfort, nutrition and hydration  Taken 12/20/2023 1000 by Jaelyn Lee RN  Remains free of injury from restraints (restraint for interference with medical device): Every 2 hours: Monitor safety, psychosocial status, comfort, nutrition and hydration  Taken 12/20/2023 0800 by Jaelyn Lee RN  Remains free of injury from restraints (restraint for interference with medical device): Every 2 hours: Monitor safety, psychosocial status, comfort, nutrition and hydration  Taken 12/20/2023 0600 by Natalie Coyle RN  Remains free of injury from restraints (restraint for interference with medical device):   Determine that other, less restrictive measures have been tried or would not be effective before applying the restraint   Evaluate the patient's condition at the time of restraint application   Inform patient/family regarding the reason for restraint   Every 2 hours: Monitor safety, psychosocial status, comfort, nutrition and hydration     Jaelyn Lee RN   
  Problem: Safety - Medical Restraint  Goal: Remains free of injury from restraints (Restraint for Interference with Medical Device)  Description: INTERVENTIONS:  1. Determine that other, less restrictive measures have been tried or would not be effective before applying the restraint  2. Evaluate the patient's condition at the time of restraint application  3. Inform patient/family regarding the reason for restraint  4. Q2H: Monitor safety, psychosocial status, comfort, nutrition and hydration  Outcome: Progressing  Flowsheets (Taken 12/20/2023 0016)  Remains free of injury from restraints (restraint for interference with medical device):   Determine that other, less restrictive measures have been tried or would not be effective before applying the restraint   Evaluate the patient's condition at the time of restraint application   Inform patient/family regarding the reason for restraint   Every 2 hours: Monitor safety, psychosocial status, comfort, nutrition and hydration     
  Problem: Skin/Tissue Integrity  Goal: Absence of new skin breakdown  Description: 1.  Monitor for areas of redness and/or skin breakdown  2.  Assess vascular access sites hourly  3.  Every 4-6 hours minimum:  Change oxygen saturation probe site  4.  Every 4-6 hours:  If on nasal continuous positive airway pressure, respiratory therapy assess nares and determine need for appliance change or resting period.  12/17/2023 0929 by Loreto Abbott, RN  Outcome: Progressing     Problem: Safety - Adult  Goal: Free from fall injury  12/17/2023 0929 by Loreto Abbott, RN  Outcome: Progressing     Problem: Discharge Planning  Goal: Discharge to home or other facility with appropriate resources  12/17/2023 0929 by Loreto Abbott, RN  Outcome: Progressing     
BRONCHOSPASM/BRONCHOCONSTRICTION     [x]         IMPROVE AERATION/BREATH SOUNDS  [x]   ADMINISTER BRONCHODILATOR THERAPY AS APPROPRIATE  [x]   ASSESS BREATH SOUNDS  []   IMPLEMENT AEROSOL/MDI PROTOCOL  PROVIDE ADEQUATE OXYGENATION WITH ACCEPTABLE SP02/ABG'S    [x]  IDENTIFY APPROPRIATE OXYGEN THERAPY  [x]   MONITOR SP02/ABG'S AS NEEDED   [x]   PATIENT EDUCATION AS NEEDED     PATIENT EDUCATION AS NEEDED    
BRONCHOSPASM/BRONCHOCONSTRICTION     [x]         IMPROVE AERATION/BREATH SOUNDS  [x]   ADMINISTER BRONCHODILATOR THERAPY AS APPROPRIATE  [x]   ASSESS BREATH SOUNDS  []   IMPLEMENT AEROSOL/MDI PROTOCOL  PROVIDE ADEQUATE OXYGENATION WITH ACCEPTABLE SP02/ABG'S    [x]  IDENTIFY APPROPRIATE OXYGEN THERAPY  [x]   MONITOR SP02/ABG'S AS NEEDED   [x]   PATIENT EDUCATION AS NEEDED     PATIENT EDUCATION AS NEEDED    
Orthopedic Surgery Brief Plan of Care Note    Caio Arce is a 83 y.o. male being seen for bilateral knee pain.  Intra-articular aspirate was obtained yesterday from the left knee.  The right knee was aspirated as well however insufficient fluid was obtained.  Synovial fluid analysis was performed which was not concerning for septic arthritis.  Culture obtained from the sample showed a GPCC growth which is likely contaminant.  Will continue to monitor.  Please page orthopedic surgery with any change in condition or with questions or concerns.    Edwin Barrios MD  Orthopedic Surgery Resident, PGY-2  [place], [place]    
Patient noticed to have left knee swelling with some warmth and tenderness. Right knee effusion on exam. Worsening leukocytosis. Concern for septic arthritis.  Underlying h/o gout    Plan:  Ortho consult.Hold heparin in case Arthrocentesis  ID on board. Appreciate recs.   
Responded to rapid response that was called to floor.  Patient seen at bedside, appeared to be hallucinating, was moderately agitated.  Patient is known COVID-positive and this was his reason for admission.  Hemodynamically stable on initial evaluation.  Spoke with nursing who noted the rapid was called due to concerns for ventricular tachycardia on the monitor.  Of note patient had frequent shaking and when calmed down, the monitor did show a regular,  rhythm that was tachycardic in the low 100s.  Patient's airway was patent and he was confused and saying nonsensical words but saturating well on nasal cannula oxygen.  Not in any acute respiratory distress.  Lungs clear to auscultation bilaterally.  Throughout time at bedside, patient's blood pressure was within normal to hypertensive.  We attempted to obtain EKG however because of patient's agitation, we are unable to get a readable rhythm.  We also obtained a ABG at bedside that was unconcerning for acute respiratory compromise.  Patient's mentation although altered was not concerning for possible airway compromise.  Discussed this with primary team at bedside.  They were agreeable with giving the patient medication for agitation and reconsulting the ICU if patient were to decompensate.    Anat Hsu,   Emergency medicine PGY 2  Critical care team  
Initiate Psychosocial CNS and Spiritual Care consult, as indicated  12/19/2023 2256 by Natalie Coyle RN  Outcome: Progressing  12/19/2023 0911 by Jaelyn Lee RN  Outcome: Progressing  Flowsheets (Taken 12/19/2023 0911)  Effect of thought disturbance (confusion, delirium, dementia, or psychosis) are managed with adequate functional status:   Assess for contributors to thought disturbance, including medications, impaired vision or hearing, underlying metabolic abnormalities, dehydration, psychiatric diagnoses, notify LIP   [place] high risk fall precautions, as indicated   Provide frequent short contacts to provide reality reorientation, refocusing and direction     Problem: ABCDS Injury Assessment  Goal: Absence of physical injury  Outcome: Progressing     Problem: Respiratory - Adult  Goal: Achieves optimal ventilation and oxygenation  12/19/2023 2256 by Natalie Coyle RN  Outcome: Progressing  12/19/2023 0911 by Jaelyn Lee, RN  Outcome: Progressing     
Progressing     Problem: Respiratory - Adult  Goal: Achieves optimal ventilation and oxygenation  Outcome: Progressing     
Verbalizes/displays adequate comfort level or baseline comfort level  12/23/2023 2331 by iY Nur RN  Outcome: Progressing  12/23/2023 1930 by Violet Rea RN  Outcome: Not Progressing     Problem: Nutrition Deficit:  Goal: Optimize nutritional status  12/23/2023 2331 by Yi Nur RN  Outcome: Progressing  12/23/2023 1930 by Violet Rea RN  Outcome: Not Progressing     Problem: Skin/Tissue Integrity  Goal: Absence of new skin breakdown  Description: 1.  Monitor for areas of redness and/or skin breakdown  2.  Assess vascular access sites hourly  3.  Every 4-6 hours minimum:  Change oxygen saturation probe site  4.  Every 4-6 hours:  If on nasal continuous positive airway pressure, respiratory therapy assess nares and determine need for appliance change or resting period.  12/23/2023 2331 by Yi Nur RN  Outcome: Progressing  12/23/2023 1930 by Violet Rea RN  Outcome: Not Progressing     Problem: Confusion  Goal: Confusion, delirium, dementia, or psychosis is improved or at baseline  Description: INTERVENTIONS:  1. Assess for possible contributors to thought disturbance, including medications, impaired vision or hearing, underlying metabolic abnormalities, dehydration, psychiatric diagnoses, and notify attending LIP  2. [place] high risk fall precautions, as indicated  3. Provide frequent short contacts to provide reality reorientation, refocusing and direction  4. Decrease environmental stimuli, including noise as appropriate  5. Monitor and intervene to maintain adequate nutrition, hydration, elimination, sleep and activity  6. If unable to ensure safety without constant attention obtain sitter and review sitter guidelines with assigned personnel  7. Initiate Psychosocial CNS and Spiritual Care consult, as indicated  12/23/2023 2331 by Yi Nur RN  Outcome: Progressing  12/23/2023 1930 by Violet Rea RN  Outcome: Not Progressing     Problem: ABCDS Injury Assessment  Goal: 
nutrition and hydration  Taken 12/21/2023 0800 by Jaelyn Lee RN  Remains free of injury from restraints (restraint for interference with medical device): Every 2 hours: Monitor safety, psychosocial status, comfort, nutrition and hydration  Taken 12/21/2023 0600 by Natalie Coyle RN  Remains free of injury from restraints (restraint for interference with medical device):   Determine that other, less restrictive measures have been tried or would not be effective before applying the restraint   Evaluate the patient's condition at the time of restraint application   Inform patient/family regarding the reason for restraint   Every 2 hours: Monitor safety, psychosocial status, comfort, nutrition and hydration  Taken 12/21/2023 0400 by Natalie Coyle RN  Remains free of injury from restraints (restraint for interference with medical device):   Determine that other, less restrictive measures have been tried or would not be effective before applying the restraint   Evaluate the patient's condition at the time of restraint application   Inform patient/family regarding the reason for restraint   Every 2 hours: Monitor safety, psychosocial status, comfort, nutrition and hydration  Taken 12/21/2023 0200 by Natalie Coyle RN  Remains free of injury from restraints (restraint for interference with medical device):   Determine that other, less restrictive measures have been tried or would not be effective before applying the restraint   Inform patient/family regarding the reason for restraint   Evaluate the patient's condition at the time of restraint application   Every 2 hours: Monitor safety, psychosocial status, comfort, nutrition and hydration  Taken 12/21/2023 0000 by Natalie Coyle RN  Remains free of injury from restraints (restraint for interference with medical device):   Determine that other, less restrictive measures have been tried or would not be effective before applying the restraint   
physical injury  Outcome: Progressing     Problem: Respiratory - Adult  Goal: Achieves optimal ventilation and oxygenation  12/18/2023 0431 by Kojo Alcantar RN  Outcome: Progressing  12/17/2023 2151 by Genesis Goddard RCP  Outcome: Progressing     
RN  Remains free of injury from restraints (restraint for interference with medical device):   Determine that other, less restrictive measures have been tried or would not be effective before applying the restraint   Evaluate the patient's condition at the time of restraint application   Inform patient/family regarding the reason for restraint   Every 2 hours: Monitor safety, psychosocial status, comfort, nutrition and hydration  Taken 12/20/2023 0400 by Natalie Coyle RN  Remains free of injury from restraints (restraint for interference with medical device):   Determine that other, less restrictive measures have been tried or would not be effective before applying the restraint   Evaluate the patient's condition at the time of restraint application   Inform patient/family regarding the reason for restraint   Every 2 hours: Monitor safety, psychosocial status, comfort, nutrition and hydration  Taken 12/20/2023 0200 by Natalie Coyle RN  Remains free of injury from restraints (restraint for interference with medical device):   Determine that other, less restrictive measures have been tried or would not be effective before applying the restraint   Inform patient/family regarding the reason for restraint   Evaluate the patient's condition at the time of restraint application   Every 2 hours: Monitor safety, psychosocial status, comfort, nutrition and hydration  12/20/2023 0112 by Natalie Coyle RN  Outcome: Progressing  Flowsheets (Taken 12/20/2023 0016)  Remains free of injury from restraints (restraint for interference with medical device):   Determine that other, less restrictive measures have been tried or would not be effective before applying the restraint   Evaluate the patient's condition at the time of restraint application   Inform patient/family regarding the reason for restraint   Every 2 hours: Monitor safety, psychosocial status, comfort, nutrition and hydration       Jaelyn Lee RN 
for changes in respiratory status  12/22/2023 0857 by Mary Mcneal RCP  Outcome: Progressing  12/22/2023 0658 by Ekta Huston RN  Outcome: Progressing  Flowsheets (Taken 12/21/2023 2300)  Achieves optimal ventilation and oxygenation: Assess for changes in respiratory status     
RN  Outcome: Progressing  Flowsheets (Taken 12/24/2023 2000)  Remains free of injury from restraints (restraint for interference with medical device): Every 2 hours: Monitor safety, psychosocial status, comfort, nutrition and hydration  12/24/2023 1942 by Vioelt Rea RN  Outcome: Progressing  12/24/2023 1936 by Violet Rea RN  Outcome: Progressing  Flowsheets (Taken 12/24/2023 0800)  Remains free of injury from restraints (restraint for interference with medical device): Every 2 hours: Monitor safety, psychosocial status, comfort, nutrition and hydration     
regarding the reason for restraint   Every 2 hours: Monitor safety, psychosocial status, comfort, nutrition and hydration  12/20/2023 1925 by Jaelyn Lee RN  Outcome: Progressing  Flowsheets  Taken 12/20/2023 1912 by Jaelyn Lee RN  Remains free of injury from restraints (restraint for interference with medical device): Every 2 hours: Monitor safety, psychosocial status, comfort, nutrition and hydration  Taken 12/20/2023 1000 by Jaelyn Lee RN  Remains free of injury from restraints (restraint for interference with medical device): Every 2 hours: Monitor safety, psychosocial status, comfort, nutrition and hydration  Taken 12/20/2023 0800 by Jaelyn Lee RN  Remains free of injury from restraints (restraint for interference with medical device): Every 2 hours: Monitor safety, psychosocial status, comfort, nutrition and hydration  Taken 12/20/2023 0600 by Natalie Coyle RN  Remains free of injury from restraints (restraint for interference with medical device):   Determine that other, less restrictive measures have been tried or would not be effective before applying the restraint   Evaluate the patient's condition at the time of restraint application   Inform patient/family regarding the reason for restraint   Every 2 hours: Monitor safety, psychosocial status, comfort, nutrition and hydration

## 2024-01-08 NOTE — PROGRESS NOTES
Nutrition Note    Discussed with RN.    Pt s/p MBSS, SLP recommends Minced and Moist with Thin liquids. Per RN, pt eating well on diet, noted >50% of mashed potatoes and troy steak at lunch today. RN notes that pt complaining of feeling full while eating, which prevents him from increasing intake. RN questioned if TF could be modified.     Will modify TF to Diabetic 50 mL/hr x 10 hrs. Provides 750 kcal, 41 g PRO, 380 mL free water.     Will add high kcal/high PRO ONS BID and Frozen ONS once daily. Monitor for PO intake.     Electronically signed by Phoebe Vieyra MS, RD, LD on 1/5/24 at 4:30 PM EST    Contact:   Floor Mobile: [phone]  Desk Phone: [phone]  RD Weekend Mobile: [phone]      
    [facility]  Internal Medicine Teaching Residency Program  Inpatient Daily Progress Note  ______________________________________________________________________________    Patient: Caio Arce  YOB: 1940   MRN:[id number]    Acct: [id number]     Room: 0415/0415-01  Admit date: 12/10/2023  Today's date: 12/13/23  Number of days in the hospital: 3    SUBJECTIVE   Admitting Diagnosis: COVID-19  CC: Flulike symptoms, diarrhea, congestion   Pt examined at bedside. Chart & results reviewed.     Afebrile, hemodynamically stable, on room air. Confused this morning, AOx2. Has complained of visual hallucinations. Possibly related to sleep deprivation and steroids.    Review of Systems  Constitutional:  Negative for chills and fever.   HENT:  Positive for congestion.    Respiratory:  Positive for cough and shortness of breath. Negative for wheezing.    Cardiovascular:  Negative for chest pain, palpitations and leg swelling.   Gastrointestinal:  Positive for diarrhea and nausea. Negative for abdominal pain.   Genitourinary:  Negative for dysuria and enuresis.   Neurological:  Negative for headaches.   Psychiatric/Behavioral:  Negative for behavioral problems.      BRIEF HISTORY     The patient is a 83 y.o. male with history of BPH, hypertension, hyperlipidemia, HFrEF EF 45-50 in 2023, mitral valve replacement 2014, on Coumadin with PPM s/p complete heart block. He presents with diarrhea, myalgia, congestion, cough productive of greenish phlegm for the last 4 to 5 days.  The patient has also had right-sided abdominal pain during this time.  He has not had any known sick contacts with similar symptoms.  He denies any symptoms of melanotic stools, any bleeding per rectum or orally, any hematochezia or hemoptysis.  He has no known history of CKD, diabetes.  Chart review also reveals history of paroxysmal A-fib, the patient is unaware of this.  The patient has received 
    [facility]  Internal Medicine Teaching Residency Program  Inpatient Daily Progress Note  ______________________________________________________________________________    Patient: Caio Arce  YOB: 1940   MRN:[id number]    Acct: [id number]     Room: 0415/0415-01  Admit date: 12/10/2023  Today's date: 12/11/23  Number of days in the hospital: 1    SUBJECTIVE   Admitting Diagnosis: COVID-19  CC: Flulike symptoms, diarrhea, congestion   Pt examined at bedside. Chart & results reviewed.     Afebrile, hemodynamically stable, on room air. Confused this morning, only aware of self. Has complained of visual hallucinations. Possibly related to sleep deprivation.    Review of Systems  Constitutional:  Negative for chills and fever.   HENT:  Positive for congestion.    Respiratory:  Positive for cough and shortness of breath. Negative for wheezing.    Cardiovascular:  Negative for chest pain, palpitations and leg swelling.   Gastrointestinal:  Positive for diarrhea and nausea. Negative for abdominal pain.   Genitourinary:  Negative for dysuria and enuresis.   Neurological:  Negative for headaches.   Psychiatric/Behavioral:  Negative for behavioral problems.      BRIEF HISTORY     The patient is a 83 y.o. male with history of BPH, hypertension, hyperlipidemia, HFrEF EF 45-50 in 2023, mitral valve replacement 2014, on Coumadin with PPM s/p complete heart block. He presents with diarrhea, myalgia, congestion, cough productive of greenish phlegm for the last 4 to 5 days.  The patient has also had right-sided abdominal pain during this time.  He has not had any known sick contacts with similar symptoms.  He denies any symptoms of melanotic stools, any bleeding per rectum or orally, any hematochezia or hemoptysis.  He has no known history of CKD, diabetes.  Chart review also reveals history of paroxysmal A-fib, the patient is unaware of this.  The patient has received 
   12/23/23 1807   Airway Clearance   $Obtained Sample $Induced Sputum (charge not used for Bronchoscopy)     Sputum sample obtained and sent to lab  
   12/23/23 1831   Patient Observation   Pulse 80   Respirations 16   Ventilator Settings   Resp Rate (Set) (S)  12 bpm   FiO2  (S)  50 %       
   12/24/23 0517   Ventilator Settings   FiO2  (S)  40 %     Per ABG  
   12/24/23 0810   Vent Information   Vent Mode (S)  CPAP/PS   Ventilator Settings   PEEP/CPAP (cmH2O) 5   Pressure Support (cm H2O) (S)  6 cm H2O       CPAP/PS trial started as above. RN at bedside/  
   12/24/23 1318   Vent Information   Vent Mode (S)  AC/PRVC   Ventilator Settings   Resp Rate (Set) 12 bpm   PEEP/CPAP (cmH2O) 5   FiO2  30 %   Vt (Set, mL) 580 mL     Pt switched to full support at this time due to increased RR and agitation.  
   12/25/23 1427   RT Protocol   History Pulmonary Disease 2   Respiratory pattern 0   Breath sounds 2   Cough 0   Bronchodilator Assessment Score 4       
  Critical care team - Resident sign-out to medicine service      Date and time: 12/21/2023 3:15 PM  Patient's name:  Caio Arce  Medical Record Number: [id number]  Patient's account/billing number: [id number]  Patient's YOB: 1940  Age: 83 y.o.  Date of Admission: 12/10/2023  6:59 AM  Length of stay during current admission: 11    Primary Care Physician: Jaqui Watkins PA-C    Code Status: Full Code    Mode of physician to physician communication:        [] Via telephone   [] In person     Date and time of sign-out: 12/21/2023 3:15 PM    Accepting Internal Medicine attending: Dr. Johns    Accepting Medicine team: intermed    Accepting team's attending: Dr. johns    Patient's current ICU Bed:  3006     Patient's assigned bed on floor:  108        [] Med-Surg Monitored [x] Step-down       [] Psychiatry ICU       [] Psych floor     Reason for ICU admission:     Worsening mentation, worsening respiratory status    ICU course summary:     Caio Arce is a 83 y.o. male with history of BPH, hypertension, hyperlipidemia, HFrEF EF 45-50 in 2023, mitral valve replacement 2014, on Coumadin with PPM s/p complete heart block.      Patient was admitted on 12/10 due to diarrhea, and myalgia, congestion, cough with greenish phleghm.  He was tested to be COVID positive.     Worsening left hilar infiltrate on chest x-ray, worsening leukocytosis, getting more altered. Critical care paged to assess mentation.    He has completed course of remdesivir and Decadron for COVID-19.    Started on cefoxitin 2 g every 8 hours for 7 days due to aspiration pneumonia.    Speech therapy recommends patient to be kept n.p.o. and get a  modified barium swallow study.    Cardiology was consulted for NSTEMI, patient is on heparin drip as per cardiology. They are planning to do an ischemia work-up during this admission.     Ortho was consulted due to septic arthritis bilateral knees, but arthrocentesis fluid is not concerning 
  General Surgery  Consult    PATIENT NAME: Caio Arce  AGE: 83 y.o.  MEDICAL RECORD NO. [id number]  DATE: 12/23/2023  SURGEON:   PRIMARY CARE PHYSICIAN: Jaqui Watkins PA-C    Patient evaluated at the request of  Dr. Pacheco  Reason for evaluation: PEG placement    Patient information was obtained from patient.  History/Exam limitations: none.    IMPRESSION:     Patient Active Problem List   Diagnosis    Arthritis    COPD (chronic obstructive pulmonary disease) (HCC)    Reflux    Hypertension    Hyperlipidemia    Hyperglycemia    Presence of cardiac pacemaker    Groin mass    Hyperkalemia    Varicose veins    Orthostasis    Hypotension due to drugs    Vertigo    Anticoagulant long-term use    History of mitral valve replacement    Pacemaker failure, initial encounter    Complete heart block (HCC)    Nicotine dependence, uncomplicated    Sick sinus syndrome (HCC)    Paroxysmal atrial fibrillation ([facility])    Hypokalemia    Hypocalcemia    Pacemaker at end of battery life    COVID-19    Pneumonia due to COVID-19 virus    Elevated troponin    Unvaccinated for covid-19    CRP elevated    Syncope and collapse    Orthostatic hypotension    H/O mitral valve replacement with mechanical valve    Delirium    NSTEMI (non-ST elevated myocardial infarction) ([facility])    S/P MVR (mitral valve repair)    Aortic valve regurgitation    Permanent atrial fibrillation (HCC)    Multifocal pneumonia    Aspiration pneumonia of right middle lobe (HCC)    Bandemia    Acute respiratory failure with hypoxia (HCC)    Metabolic encephalopathy    Supratherapeutic INR    Acute metabolic encephalopathy    Elevated C-reactive protein (CRP)    Acute pain of right knee   Acute respiratory failure in setting of Covid 19 Pneumonia  Dysphagia, failed recent swallow study.        PLAN:   Continue with current medical management, hold Coumadin  Resident discussed with family who is agreeable for PEG placement.  Plan for PEG placement today, consent 
  [facility]  Progress Note    Caio Arce  MRN: [id number]  Date: 12/27/2023  LOS: 17     Reason for F/U :   COVID infection with pneumonia    Impression :   COVID-19 positive test 12/10/2023 with Acute Pneumonia   Acute hypoxic respiratory failure  Extubated 12/26/2023  Possible aspiration pneumonia  COPD  Paroxysmal atrial fibrillation  Diarrhea with fecal incontinence    Recommendations:   The patient completed a course of remdesivir 12/10/2023 through 12/14/2023  The patient did have Decadron started 12/10/2023  The patient completed a course of antibiotic therapy with cefoxitin for 7 days on 12/22/2023  The patient was extubated at the family's request does have some respiratory insufficiency and CODE STATUS has been changed to DNR CC arrest no intubation    Infection Control Recommendations:   Universal precautions    Discharge Planning:   Patient will need Midline Catheter Insertion/ PICC line Insertion: No  Patient will need: Home IV , Infusion Center,  SNF,  LTAC: Undetermined  Patient willneed outpatient wound care: No    Medical Decision making / Summary of Stay:   Caio Arce is a 83 y.o.-year-old male who was initially admitted on 12/10/2023. Patient seen at the request of Dr. GRACE Reynoso     INITIAL HISTORY:     Patient, with a history of COPD, HTN and PPM on Coumadin, presented through ER with complaints of worsening diarrhea x 3 weeks with decreased oral intake, malaise, productive cough with greenish sputum, and runny nose for the past 5 days.   He denies sick contacts, and the diarrhea has gotten to the point where he has become incontinent of stool.  He denied shortness of breath, chest pain, hematochezia, vomiting or abdominal pain.  He has not received the Covid vaccine, but has received the flu vaccine.      The patient was found to be afebrile with stable vital signs on room air.      Work-up in the ED revealed the patient to be Covid positive.   His lab-work was 
  [facility]   Department of Internal Medicine & Critical Care - Staff Internal Medicine Teaching Service     Critical Care - Daily Progress Note      Date and time: 12/21/2023 7:23 AM  Patient's name:  Caio Arce  Medical Record Number: [id number]  Patient's account/billing number: [id number]  Patient's YOB: 1940  Age: 83 y.o.  Date of Admission: 12/10/2023  6:59 AM  Length of stay during current admission: 11  Primary Care Physician: Jaqui Watkins PA-C  ICU Attending Physician: Orlando Marshall MD    Code Status: Full Code  Reason for ICU admission:   Chief Complaint   Patient presents with    Diarrhea     X3 weeks, only during the day, not at night, denies emesis       Subjective:     Pt was seen and examined at bedside.  Resting comfortably in the bed.  Vitals stable.  Labs reviewed.    OVERNIGHT EVENTS:         No acute events overnight          CURRENT VENTILATION STATUS:  Nasal Cannula at 8 l/m    SEDATION AND ANALGESIA:   Precedex     PARALYZED: No    VASOPRESSORS:   None    URINE OUTPUT:   [x] Good  [] Low  [] Anuric      CONSULT SERVICES: CARDIOLOGY and INFECTIOUS DISEASES    BRIEF SUMMARY:    Caio Arce is a 83 y.o. male with history of BPH, hypertension, hyperlipidemia, HFrEF EF 45-50 in 2023, mitral valve replacement 2014, on Coumadin with PPM s/p complete heart block.      Patient was admitted on 12/10 due to diarrhea, and myalgia, congestion, cough with greenish phleghm.  He was tested to be COVID positive.     Worsening left hilar infiltrate on chest x-ray, worsening leukocytosis, getting more altered. Critical care paged to assess mentation.      12/21/2023 :       REVIEW OF SYSTEMS  Unable to obtain due to mentation.      OBJECTIVE:     VITAL SIGNS:  /76   Pulse 61   Temp 99.9 °F (37.7 °C)   Resp 17   Ht 1.753 m (5' 9.02\")   Wt 71.3 kg (157 lb 3 oz)   SpO2 97%   BMI 23.20 kg/m²   Tmax over 24 hours:  Temp (24hrs), 
  [facility]  Progress Note                   Date:   12/12/2023  Patient name: Caio Arce  Date of admission:  12/10/2023  6:59 AM  MRN:   [id number]  YOB: 1940  PCP: Jaqui Watkins, JASON    Reason for Admission: DAMARIS (acute kidney injury) (HCC) [N17.9]  Pneumonia due to COVID-19 virus [U07.1, J12.82]  COVID-19 [U07.1]    Subjective:       Clinical Changes /Abnormalities: Patient seen and examined. No acute CV events overnight. Labs, vitals, and tele reviewed. Denies CP.  States he does have SOB related to his +COVID. Bedside sitter present. States he has been  having hallucinations. On RA     Review of Systems    Medications:   Scheduled Meds:   sodium chloride flush  5-40 mL IntraVENous 2 times per day    warfarin placeholder: dosing by pharmacy   Other RX Placeholder    remdesivir 100 mg in sodium chloride 0.9 % 250 mL IVPB  100 mg IntraVENous Q24H    dexamethasone  6 mg Oral Daily     Continuous Infusions:   sodium chloride       CBC:   Recent Labs     12/10/23  0723 12/11/23  0239 12/12/23  0226   WBC 9.4 6.3 8.7   HGB 16.4 15.0 14.1   * See Reflexed IPF Result See Reflexed IPF Result     BMP:    Recent Labs     12/10/23  0723 12/11/23  0239 12/12/23  0030 12/12/23 0226    140 147* 146*   K 3.6* 4.1 4.0 4.1    108* 117* 116*   CO2 22 20 19* 18*   BUN 56* 40*  --  40*   CREATININE 1.4* 1.0  --  0.9   GLUCOSE 114* 123*  --  129*     Hepatic:  Recent Labs     12/10/23  0723   AST 63*   ALT 37   BILITOT 0.7   ALKPHOS 63     Troponin:   Recent Labs     12/10/23  0723 12/10/23  0849   TROPHS 38* 34*     BNP: No results for input(s): \"BNP\" in the last 72 hours.  Lipids: No results for input(s): \"CHOL\", \"HDL\" in the last 72 hours.    Invalid input(s): \"LDLCALCU\"  INR:   Recent Labs     12/10/23  0723 12/11/23  0239 12/12/23  0226   INR 3.1 3.7 4.7*       Objective:   Vitals: BP (!) 144/83   Pulse 65   Temp 97.1 °F (36.2 °C) (Temporal)   Resp 20   Ht 1.753 
  Neurology Nurse Practitioner Progress Note      INTERVAL HISTORY: This is a 83 y.o.  male admitted 12/10/2023 for diarrhea, myalgia, arthralgia, productive cough and generalized fatigue. This is a follow-up neurology progress note. The patient was examined and the chart was reviewed. Discussed with the RN. There were no acute events overnight. Pt was alert; didn't know he was in a hospital. Difficult to understand speech due to oxygen mask. Followed simple commands x 4 limbs.     HPI: Caio Arce is a 83 y.o. male with H/O A fib (on warfarin), HTN, HLD, COPD, BPH, osteoarthritis, idiopathic heart block s/p pacemaker placement (2014), MVP s/p MVR (2014), who was admitted 12/10/2023 for diarrhea, myalgia, arthralgia, productive cough and generalized fatigue.  The symptoms had been ongoing for past few days PTA.  Diarrhea was intermittent but recently got worse.  He reported fecal incontinence as he cannot make it to the bathroom in time.  He also reported nausea with decreased oral intake, denied vomiting.  Patient reported feeling generalized weak and lethargic over time.  He lives with his wife but she has been unable to take care of him.  At baseline he is alert and oriented.  Patient [facility] ER for [facility].     Patient was admitted with COVID-19 pneumonia.  Neurology was consulted on 12/16/2023 due to altered mentation.     insulin lispro  0-4 Units SubCUTAneous Q6H    ipratropium 0.5 mg-albuterol 2.5 mg  1 Dose Inhalation Q6H WA RT    cefOXitin  2,000 mg IntraVENous Q8H    [Held by provider] metoprolol tartrate  12.5 mg Oral BID    atorvastatin  20 mg Oral Daily    famotidine  20 mg Oral BID    [Held by provider] aspirin  81 mg Oral Daily    melatonin  6 mg Oral Nightly    sodium chloride flush  5-40 mL IntraVENous 2 times per day       Past Medical History:   Diagnosis Date    Anxiety     Arthritis     BPH (benign prostatic hyperplasia)     Constipation     COPD (chronic obstructive 
  Pharmacy Note - Extended Infusion Beta-Lactam Adjustment    Piperacillin/Tazobactam  4500MG q6h  for treatment of Aspiration Pneumonia. Per [facility] Extended Infusion Beta-Lactam Policy, piperacillin/tazobactam will be changed to 4500mg loading dose followed by 3375mg Q8h extended infusion    Estimated Creatinine Clearance: Estimated Creatinine Clearance: 51 mL/min (based on SCr of 1.1 mg/dL).    BMI: Body mass index is 23.4 kg/m².    Please call with any questions.    Thank you,    Gene Cm, RPH    
  Physician Progress Note      PATIENT:               ROSINA LINO  CSN #:                  [id number]  :                       1940  ADMIT DATE:       12/10/2023 6:59 AM  DISCH DATE:  RESPONDING  PROVIDER #:        THEODORA GARG          QUERY TEXT:    Pt admitted with Covid 19 PNA. Pt noted to have AMS. If possible, please   document in the progress notes and discharge summary if you are evaluating and   / or treating any of the following:    The medical record reflects the following:  Risk Factors: Age 83, Covid PNA. Decadron.    Clinical Indicators: 23 RN progress note: Patient pulled out left   peripheral IV, up unassisted with bed alarm sounding. Tele sitter   initiated. IM progress note: Confused this morning, only aware of self.   Has complained of visual hallucinations. Possibly related to sleep   deprivation. Serum Cr 1.4 down to 1.0    Treatment: Telesitter, Remdesiver, Decadron, labs.    Thank-you,  Wilma Solorzano RN, CDS  [email or web address]  Options provided:  -- Metabolic encephalopathy  -- Metabolic encephalopathy, now resolved  -- Toxic encephalopathy  -- Toxic encephalopathy, now resolved  -- Toxic metabolic encephalopathy  -- Toxic metabolic encephalopathy, now resolved  -- Other - I will add my own diagnosis  -- Disagree - Not applicable / Not valid  -- Disagree - Clinically unable to determine / Unknown  -- Refer to Clinical Documentation Reviewer    PROVIDER RESPONSE TEXT:    This patient has metabolic encephalopathy.    Query created by: Wilma Solorzano on 2023 2:35 PM      Electronically signed by:  THEODORA GARG 2023 8:57 AM          
 [facility]  Consult Note       NAME:  Caio Arce  MEDICAL RECORD NUMBER:  [id number]  AGE: 83 y.o.   GENDER: male  : 1940  TODAY'S DATE:  2023    Subjective:     Reason for WOCN Evaluation and Assessment: \"wound buttocks\"      Caio Arce is a 83 y.o. male referred by:   [x] Physician  [] Nursing  [] Other:     Wound Identification:  Wound Type: pressure  Contributing Factors: chronic pressure, decreased mobility, shear force, decreased tissue oxygenation, anticoagulation therapy, incontinence of stool, incontinence of urine, and altered mental status      Diarrhea and incontinence has been problematic since prior to admission.  Sacrococcygeal area redness noted on transfer to ICU.      Mr Arce appears more alert and calm today. Disorientation and combative behavior experienced overnight.     Objective:      /68   Pulse 62   Temp (!) 95.9 °F (35.5 °C)   Resp 13   Ht 1.753 m (5' 9.02\")   Wt 71.3 kg (157 lb 3 oz)   SpO2 95%   BMI 23.20 kg/m²   Angus Risk Score: Angus Scale Score: 11    LABS    CBC:   Lab Results   Component Value Date/Time    WBC 11.6 2023 07:59 AM    RBC 3.88 2023 07:59 AM    RBC 4.82 2012 09:00 AM    HGB 12.1 2023 07:59 AM    HCT 39.2 2023 07:59 AM     CMP:  Albumin:    Lab Results   Component Value Date/Time    LABALBU 3.4 2023 05:53 PM    LABALBU 4.5 2012 09:00 AM     PT/INR:    Lab Results   Component Value Date/Time    PROTIME 19.1 2023 11:19 AM    PROTIME 15.0 2015 08:50 AM    INR 1.6 2023 11:19 AM     HgBA1c:    Lab Results   Component Value Date/Time    LABA1C 5.2 10/29/2018 11:02 AM     PTT: No components found for: \"LABPTT\"      Assessment:   Small purple/maroon skin at the edge of macerated sacral area. [employer] Hydrophilic Wound Dressing paste in use.     Measurements:  Wound 23 Coccyx (Active)   Wound Image   23 1427   Wound Etiology Deep tissue/Injury 
 [facility]  Follow Up       NAME:  Caio Arce  MEDICAL RECORD NUMBER:  [id number]  AGE: 83 y.o.   GENDER: male  : 1940  TODAY'S DATE:  2024    Subjective:     Reason for WOCN Evaluation and Assessment: \"wound buttocks\"      Caio Arce is a 83 y.o. male referred by:   [x] Physician  [] Nursing  [] Other:      Wound Identification:  Wound Type: pressure  Contributing Factors: chronic pressure, decreased mobility, shear force, decreased tissue oxygenation, anticoagulation therapy, incontinence of stool, incontinence of urine, and altered mental status        Diarrhea and incontinence has been problematic since prior to admission.  Sacrococcygeal area redness noted on transfer to ICU.                 Objective:      /76   Pulse 83   Temp 98 °F (36.7 °C) (Axillary)   Resp 18   Ht 1.778 m (5' 10\")   Wt 71.5 kg (157 lb 10.1 oz)   SpO2 98%   BMI 22.62 kg/m²   Angus Risk Score: Angus Scale Score: 16    LABS    CBC:   Lab Results   Component Value Date/Time    WBC 9.8 2023 10:13 AM    RBC 3.86 2023 10:13 AM    RBC 4.82 2012 09:00 AM    HGB 12.0 2023 10:13 AM    HCT 40.8 2023 10:13 AM     CMP:  Albumin:    Lab Results   Component Value Date/Time    LABALBU 3.4 2023 05:53 PM    LABALBU 4.5 2012 09:00 AM     PT/INR:    Lab Results   Component Value Date/Time    PROTIME 30.4 2024 06:37 AM    PROTIME 15.0 2015 08:50 AM    INR 3.0 2024 06:37 AM     HgBA1c:    Lab Results   Component Value Date/Time    LABA1C 5.2 10/29/2018 11:02 AM     PTT: No components found for: \"LABPTT\"      Assessment:   Fecal incontinence encountered on exam. All dry, rough and also devitalized tissue is now gone from the sacrococcygeal area. Dry desquamation of resolving dermatitis and a full thickness wound directly over the sacral prominence remains.     Measurements:       24 1120   Wound 23 Coccyx   Date First Assessed: 
 [facility]  Follow Up      NAME:  Caio Arce  MEDICAL RECORD NUMBER:  [id number]  AGE: 83 y.o.   GENDER: male  : 1940  TODAY'S DATE:  2023    Subjective:     Reason for WOCN Evaluation and Assessment: \"wound buttocks\"      Caio Arce is a 83 y.o. male referred by:   [x] Physician  [] Nursing  [] Other:      Wound Identification:  Wound Type: pressure  Contributing Factors: chronic pressure, decreased mobility, shear force, decreased tissue oxygenation, anticoagulation therapy, incontinence of stool, incontinence of urine, and altered mental status        Diarrhea and incontinence has been problematic since prior to admission.  Sacrococcygeal area redness noted on transfer to ICU.                         Objective:      /85   Pulse (!) 112   Temp 98.4 °F (36.9 °C) (Axillary)   Resp 30   Ht 1.778 m (5' 10\")   Wt 71.5 kg (157 lb 10.1 oz)   SpO2 92%   BMI 22.62 kg/m²   Angus Risk Score: Angus Scale Score: 13    LABS    CBC:   Lab Results   Component Value Date/Time    WBC 11.1 2023 06:13 AM    RBC 3.69 2023 06:13 AM    RBC 4.82 2012 09:00 AM    HGB 11.5 2023 06:13 AM    HCT 37.3 2023 06:13 AM     CMP:  Albumin:    Lab Results   Component Value Date/Time    LABALBU 3.4 2023 05:53 PM    LABALBU 4.5 2012 09:00 AM     PT/INR:    Lab Results   Component Value Date/Time    PROTIME 14.7 2023 06:13 AM    PROTIME 15.0 2015 08:50 AM    INR 1.2 2023 06:13 AM     HgBA1c:    Lab Results   Component Value Date/Time    LABA1C 5.2 10/29/2018 11:02 AM     PTT: No components found for: \"LABPTT\"      Assessment:       Measurements:       23 1155   Gastrostomy/Enterostomy/Jejunostomy Tube Gastrostomy LUQ 18 fr   Placement Date/Time: 23 1517   Present on Admission/Arrival: No  Inserted by: DR. SOLER  Type: Gastrostomy  Location: LUQ  Tube Size (fr): 18 fr   Drainage Appearance Serosanguinous   Site 
@[email or web address] [email or web address]@    [facility]   IN-PATIENT SERVICE   [facility]    Progress Note    12/23/2023    2:21 PM    Name:   Caio Arce  MRN:     [id number]     Acct:      [id number]   Room:   [facility] RM/NONE  IP Day:  13  Admit Date:  12/10/2023  6:59 AM    PCP:   Jaqui Watkins PA-C  Code Status:  Full Code    Subjective:     C/C:   Chief Complaint   Patient presents with    Diarrhea     X3 weeks, only during the day, not at night, denies emesis     Interval History Status: not changed.     Seen at bedside, hemodynamically stable currently on nasal cannula  Has NG tube in place  Plan for PEG tube patient today  Denies any pain by nodding his head, unable to understand his speech  No acute events overnight per RN    Brief History:     83-year-old male with history of essential hypertension, HFrEF, atrial fibrillation, mitral valve repair, sick sinus syndrome s/p pacemaker placement was admitted with diarrhea, flulike symptoms, found to be COVID positive, had worsening infiltrates on chest x-ray concerning for pneumonia, was admitted in ICU for acute hypoxic respiratory failure and acute encephalopathy, completed course of remdesivir, Decadron for COVID 19 pneumonia, started on cefoxitin for aspiration pneumonia, cardiology was consulted for elevated troponins, plan to do ischemia workup as outpatient, has failed swallow study, plan for PEG tube placement.  Seen by Ortho for concern of septic arthritis, cultures from right knee showed gram-positive cocci, likely contaminant, ID service following  Was seen by neurology this admission as well for mentation issues, CT head negative, EEG mild to moderate, could not do MRI brain due to pacemaker, signed off    Medications:     Allergies:  No Known Allergies    Current Meds:   Scheduled Meds:    [Held by provider] enoxaparin  1 mg/kg SubCUTAneous BID    [MAR Hold] guaiFENesin  200 mg Oral Q6H    [MAR Hold] insulin lispro  0-4 Units 
[facility]  Office: [phone]  Mario Pacheco DO, Titi Soto DO, Aneesh Segundo DO, Ian Parker DO, Willem Rodriguez MD, Mary Troy MD, Ji Stein MD, Adali Gary MD,  Hudson Will MD, India Caruso MD, Ashley Agustin MD,  Hermes Connor DO, Elli Rodriguez MD, Jluis Hernandez MD, Ryan Pacheco DO, Kenzie Ruffin MD,  Sheldon Wall DO, Lluvia Holguin MD, Ella Marshall MD, Samantha Gonzalez MD, Magi Keith MD,  Jj Otero MD, Emili Montenegro MD, Sissy Farmer MD, Neno Weinberg MD, Red Thomason MD, Merna Hood MD, Min Cope DO, Alfa Guzman DO, Elise Kohler MD,  Roshan Hinds MD, Shirley Waterhouse, CNP,  Coty Goodman CNP, Carlito Tellez, CNP,  Luz Maria Zhou, INGRIS, Flavia Mckinney, CNP, Oneyda Rosen, CNP, Antonia Dempsey CNP, Loren Astorga, CNP, Anaya Dawkins, CNP, Mita Pink PAPhoebeC, JAGJIT GalvezC, Lyndsay Morrissey, CNP, Wandy Chu, CNS, Wilma Kamara, CNP, Salma Champion, CNP, Tracy Schwab, CNP         [facility]   IN-PATIENT SERVICE   [facility]    Progress Note    1/6/2024    2:27 PM    Name:   Caio Arce  MRN:     [id number]     Acct:      [id number]   Room:   2010/2010-01   Day:  27  Admit Date:  12/10/2023  6:59 AM    PCP:   Jaqui Watkins PA-C  Code Status:  DNR-CCA    Subjective:     C/C:   Chief Complaint   Patient presents with    Diarrhea     X3 weeks, only during the day, not at night, denies emesis     Interval History Status: not changed.     Patient examined at bedside  No acute events overnight  Patient is eating much better.  His tube feeds have been changed to nightly.  Awaiting placement  Brief History:     83-year-old male past medical history of BPH, hypertension, hyperlipidemia, systolic heart failure EF 45 to 50%, mitral valve replacement in 2014 on Coumadin, history of permanent pacemaker originally presented diarrhea productive cough, sputum found to be COVID positive. 
BRONCHOSPASM/BRONCHOCONSTRICTION   [x]  IMPROVE AERATION/BREATH SOUNDS  [x]   ADMINISTER BRONCHODILATOR THERAPY AS APPROPRIATE  [x]   ASSESS BREATH SOUNDS  []   IMPLEMENT AEROSOL/MDI PROTOCOL  [x]   PATIENT EDUCATION AS NEEDED    
BRONCHOSPASM/BRONCHOCONSTRICTION   [x]  IMPROVE AERATION/BREATH SOUNDS  [x]   ADMINISTER BRONCHODILATOR THERAPY AS APPROPRIATE  [x]   ASSESS BREATH SOUNDS  [x]   IMPLEMENT AEROSOL/MDI PROTOCOL  [x]   PATIENT EDUCATION AS NEEDED    
[facility]  Office: [phone]  Mario Pacheco DO, Titi Soto, DO, Aneesh Segundo DO, Ian Parker, DO, Willem Rodriguez MD, Mary Troy MD, Ji Stein MD, Adali Gary MD,  Hudson Will MD, India Caruso MD, Ashley Agustin MD,  Hermes Connor DO, Elli Rodriguez MD, Jluis Hernandez MD, Ryan Pacheco DO, Kenzie Ruffin MD,  Sheldon Wall DO, Lluvia Holguin MD, Ella Marshall MD, Samantha Gonzalez MD, Magi Keith MD,  Jj Otero MD, Emili Montenegro MD, Sissy Farmer MD, Neno Weinberg MD, Red Thomason MD, Merna Hood MD, Min Cope DO, Alfa Guzman DO, Elise Kohler MD,  Roshan Hinds MD, Shirley Waterhouse, CNP,  Coty Goodman, CNP, Carlito Tellez, CNP,  Luz Maria Zhou, DNP, Flavia Mckinney, CNP, Oneyda Rosen, CNP, Antonia Dempsey CNP, Loren Astorga, CNP, Anaya Dawkins, CNP, Mita Pink, PAPhoebeC, JAGJIT GalvezC, Lyndsay Morrissey, CNP, Wandy Chu, CNS, Wilma Kamara, CNP, Salma Champion, CNP, Tracy Schwab, CNP         [facility]   IN-PATIENT SERVICE   [facility]    Progress Note    1/4/2024    2:16 PM    Name:   Caio Arce  MRN:     [id number]     Acct:      [id number]   Room:   2010/2010-01  IP Day:  25  Admit Date:  12/10/2023  6:59 AM    PCP:   Jaqui Watkins PA-C  Code Status:  DNR-CCA    Subjective:     C/C:   Chief Complaint   Patient presents with    Diarrhea     X3 weeks, only during the day, not at night, denies emesis     Interval History Status: not changed.     Patient examined at bedside  No acute events overnight  No chest pain  Repeat swallow study today  Vitals stable    Brief History:     83-year-old male past medical history of BPH, hypertension, hyperlipidemia, systolic heart failure EF 45 to 50%, mitral valve replacement in 2014 on Coumadin, history of permanent pacemaker originally presented diarrhea productive cough, sputum found to be COVID positive. Patient had respiratory distress while 
Comprehensive Nutrition Assessment    Type and Reason for Visit:  Consult (tube feedings)    Nutrition Recommendations/Plan:   Initiate TF of Glucerna 1.5 (Diabetic) at goal rate of 50ml/hr to provide 1800 kcal/d, 99gm protein/d  Monitor TF tolerance-adjust as needed     Malnutrition Assessment:  Malnutrition Status:  Insufficient data (12/11/23 1351)    Context:  Acute Illness         Nutrition Assessment:    Pt with increased agitation/AMS/tachypneic. Has been NPO. Plan for NG tube placement-start of tube feedings    Nutrition Related Findings:    labs/meds reviewed-D5% at 75ml/hr Wound Type: None       Current Nutrition Intake & Therapies:    Average Meal Intake: NPO  Average Supplements Intake: NPO  Diet NPO Exceptions are: Sips of Water with Meds    Anthropometric Measures:  Height: 175.3 cm (5' 9.02\")  Ideal Body Weight (IBW): 160 lbs (73 kg)       Current Body Weight: 63.7 kg (140 lb 6.9 oz), 100 % IBW. Weight Source: Bed Scale  Current BMI (kg/m2): 20.7  Usual Body Weight: 74.4 kg (164 lb) (11/22/22)  % Weight Change (Calculated): -2.4  Weight Adjustment For: No Adjustment                 BMI Categories: Normal Weight (BMI 18.5-24.9)    Estimated Daily Nutrient Needs:  Energy Requirements Based On: Kcal/kg  Weight Used for Energy Requirements: Current  Energy (kcal/day): 2198-0875 kcal/d  Weight Used for Protein Requirements: Current  Protein (g/day): 75-90gm pro/d  Method Used for Fluid Requirements: 1 ml/kcal  Fluid (ml/day): 7779-9833 ml/day    Nutrition Diagnosis:   Inadequate oral intake related to cognitive or neurological impairment as evidenced by nutrition support - enteral nutrition    Nutrition Interventions:   Food and/or Nutrient Delivery: Continue NPO, Start Tube Feeding  Nutrition Education/Counseling: No recommendation at this time  Coordination of Nutrition Care: Continue to monitor while inpatient       Goals:  Previous Goal Met: Progressing toward Goal(s)  Goals: Meet at least 75% of 
Comprehensive Nutrition Assessment    Type and Reason for Visit:  Initial, Positive Nutrition Screen    Nutrition Recommendations/Plan:   Recommend Regular diet when able     Malnutrition Assessment:  Malnutrition Status:  Insufficient data (12/11/23 1351)    Context:  Acute Illness     Findings of the 6 clinical characteristics of malnutrition:  Energy Intake:  Unable to assess  Weight Loss:  Unable to assess     Body Fat Loss:  Unable to assess     Muscle Mass Loss:  Unable to assess    Fluid Accumulation:  Unable to assess     Strength:  Not Performed    Nutrition Assessment:    Pt admitted wtih DAMARIS and COVID-19 Pneumonia with Hx COPD, HLD, HTN and OA.  Pt is currently NPO, and due to isolation was not interviewed.  No measured weights are available to antonio, only stated weights of 74.4 kg in June and current stated weight of 72.6, showing a potential slight weight loss.  Nursing notes show soft, active Abd with last BM 12/10.  Recent nutritional intake is unable to be assessed.  Will monitor for start of oral diet.    Nutrition Related Findings:    Meds/Labs reviewed. Wound Type: None       Current Nutrition Intake & Therapies:    Average Meal Intake: NPO  Average Supplements Intake: NPO  Diet NPO Exceptions are: Sips of Water with Meds    Anthropometric Measures:  Height: 175.3 cm (5' 9.02\")  Ideal Body Weight (IBW): 160 lbs (73 kg)       Current Body Weight: 72.6 kg (160 lb 0.9 oz), 100 % IBW. Weight Source: Stated  Current BMI (kg/m2): 23.6  Usual Body Weight: 74.4 kg (164 lb 0.4 oz) (June 2023)  % Weight Change (Calculated): -2.4  Weight Adjustment For: No Adjustment                 BMI Categories: Normal Weight (BMI 18.5-24.9)    Estimated Daily Nutrient Needs:  Energy Requirements Based On: Kcal/kg  Weight Used for Energy Requirements: Current  Energy (kcal/day): 4850-1768 kcal/day  Weight Used for Protein Requirements: Current  Protein (g/day): 75-85 g/day  Method Used for Fluid Requirements: 1 
Comprehensive Nutrition Assessment    Type and Reason for Visit:  Reassess    Nutrition Recommendations/Plan:   Continue Diabetic TF at goal 50 mL/hr.  Free water flushes per MD.  Monitor TF tolerance/adequacy of intakes - modify as needed.  Continue NPO.  Monitor plans for repeat MBSS as able/appropriate.  Will monitor labs, weights, and plan of care.     Malnutrition Assessment:  Malnutrition Status:  At risk for malnutrition (12/27/23 1406)    Context:  Acute Illness       Nutrition Assessment:    Pt s/p PEG placement.  Tolerating Diabetic TF at goal 50 mL/hr per RN.  Receiving free water flushes of 250 mL q 6 hrs.  Last BM 12/23.  Weights reviewed.  Labs reviewed: K 3.5 mmol/L.  Meds reviewed.    Nutrition Related Findings:    Labs/Meds reviewed.  Last BM 12/23.   Wound Type: Deep Tissue Injury (to coccyx)       Current Nutrition Intake & Therapies:    Average Meal Intake: NPO  Average Supplements Intake: NPO  ADULT TUBE FEEDING; PEG; Diabetic; Continuous; 20; Yes; 10; Q 4 hours; 50; 150; Q 4 hours  Current Tube Feeding (TF) Orders:  Feeding Route: PEG  Formula: Diabetic  Schedule: Continuous  Feeding Regimen: 50 mL/hr  Additives/Modulars: None  Water Flushes: per MD: 250 mL q 6 hrs  Current TF & Flush Orders Provides: At 50 mL/hr = 1800 kcals, 99 gm protein  Goal TF & Flush Orders Provides: Diabetic at 50 mL/hr = 1800 kcals, 99 gm protein, 911 mL free water (total 1911 mL free water TF + flushes)    Additional Calorie Sources:  None    Anthropometric Measures:  Height: 177.8 cm (5' 10\")  Ideal Body Weight (IBW): 166 lbs (75 kg)    Admission Body Weight: 63.7 kg (140 lb 6.9 oz)  Current Body Weight: 71.5 kg (157 lb 10.1 oz), 95 % IBW. Weight Source: Bed Scale  Current BMI (kg/m2): 22.6  Usual Body Weight: 74.4 kg (164 lb) (11/22/22)  % Weight Change (Calculated): -2.4  Weight Adjustment For: No Adjustment                 BMI Categories: Normal Weight (BMI 18.5-24.9)    Estimated Daily Nutrient Needs:  Energy 
Comprehensive Nutrition Assessment    Type and Reason for Visit:  Reassess    Nutrition Recommendations/Plan:   Continue Diabetic TF at goal 50 mL/hr.  Monitor TF tolerance/adequacy of intakes - modify as needed.  Continue NPO.  Monitor plans for MBSS and start of oral diet as able/appropriate.  Will monitor labs, weights, and plan of care.     Malnutrition Assessment:  Malnutrition Status:  Insufficient data (12/19/23 1133)    Context:  Acute Illness     Findings of the 6 clinical characteristics of malnutrition:  Energy Intake:  Mild decrease in energy intake  Weight Loss:  No significant weight loss     Body Fat Loss:  Mild body fat loss Triceps   Muscle Mass Loss:  Unable to assess  Fluid Accumulation:  Mild Extremities   Strength:  Not Performed    Nutrition Assessment:    Pt working with therapy at visit.  NGT in place and receiving Diabetic TF at goal rate of 50 mL/hr - discussed with RN who reports pt tolerating TF without issues.  Noted s/p BSSE yesterday which Speech Therapy recommended a MBSS be completed.  Last BM 12/19.  Weights reviewed.  Labs reviewed: Na 149 mmol/L, Glucose 215-223 mg/dL, BUN 52 mg/dL.  Meds include: Decadron, Humalog SS.    Nutrition Related Findings:    labs/meds reviewed.  last BM 12/19.  +NGT. Wound Type: None       Current Nutrition Intake & Therapies:    Average Meal Intake: NPO  Average Supplements Intake: NPO  Diet NPO Exceptions are: Sips of Water with Meds  ADULT TUBE FEEDING; Nasogastric; Diabetic; Continuous; 50; No; 200; Q 4 hours  Current Tube Feeding (TF) Orders:  Feeding Route: Nasogastric  Formula: Diabetic  Schedule: Continuous  Feeding Regimen: 50 mL/hr  Additives/Modulars: None  Water Flushes: per MD: 200 mL q 4 hrs  Current TF & Flush Orders Provides: At 50 mL/hr = 1800 kcals, 99 gm protein  Goal TF & Flush Orders Provides: Diabetic at 50 mL/hr = 1800 kcals, 99 gm protein    Additional Calorie Sources:  None    Anthropometric Measures:  Height: 175.3 cm (5' 
Comprehensive Nutrition Assessment    Type and Reason for Visit:  Reassess    Nutrition Recommendations/Plan:   Continue TF order at goal, Diabetic TF at 50 mL/hr to provide 1800 kcal, 99 gm protein, and total 1911 mL free water with 250 mL every 6 hours.   Consider starting probiotic r/t reported diarrhea  Monitor TF tolerance and adequacy, weight, labs, swallow evals, and follow up as needed.     Malnutrition Assessment:  Malnutrition Status:  At risk for malnutrition (Comment) (12/27/23 1406)    Context:  Acute Illness     Findings of the 6 clinical characteristics of malnutrition:  Energy Intake:  Mild decrease in energy intake (Comment)  Weight Loss:  No significant weight loss     Body Fat Loss:  Mild body fat loss Orbital   Muscle Mass Loss:  No significant muscle mass loss    Fluid Accumulation:  No significant fluid accumulation Extremities   Strength:  Not Performed    Nutrition Assessment:    +G-tube. At goal and tolerating Diabetic TF 50 mL/hr per RN. Pt likely D/C tomorrow per RN. LBM recorded 12/23? noted diarrhea. Per RN, no abdominal distention.    Nutrition Related Findings:    Labs/meds reviewed Wound Type: Deep Tissue Injury (to coccyx)       Current Nutrition Intake & Therapies:    Average Meal Intake: NPO  Average Supplements Intake: NPO  ADULT TUBE FEEDING; PEG; Diabetic; Continuous; 50; Yes; 10; Q 4 hours; 50; 250; Q 6 hours  Current Tube Feeding (TF) Orders:  Feeding Route: PEG  Formula: Diabetic  Schedule: Continuous  Feeding Regimen: 50 mL/hr  Additives/Modulars: None  Water Flushes: per MD: 250 mL q 6 hrs  Current TF & Flush Orders Provides: At 50 mL/hr = 1800 kcals, 99 gm protein  Goal TF & Flush Orders Provides: Diabetic at 50 mL/hr = 1800 kcals, 99 gm protein, 911 mL free water (total 1911 mL free water TF + flushes)  Additional Calorie Sources:  None    Anthropometric Measures:  Height: 177.8 cm (5' 10\")  Ideal Body Weight (IBW): 166 lbs (75 kg)    Admission Body Weight: 63.7 kg 
Comprehensive Nutrition Assessment    Type and Reason for Visit:  Reassess    Nutrition Recommendations/Plan:   Liberalize diet to Regular  Modify ONS to BID and Frozen once daily.   Monitor/encourage PO intake.     Malnutrition Assessment:  Malnutrition Status:  Insufficient data (12/11/23 1351)    Context:  Acute Illness     Findings of the 6 clinical characteristics of malnutrition:  Energy Intake:  Mild decrease in energy intake (Comment)  Weight Loss:  No significant weight loss     Body Fat Loss:  Unable to assess     Muscle Mass Loss:  Unable to assess    Fluid Accumulation:  No significant fluid accumulation     Strength:  Not Performed    Nutrition Assessment:    Per chart, PO intake 1-75% (x 2 meals noted), once ONS charted at 25-50% PO intake. LBM 12/14. No edema noted. Labs reviewed, K+ 5.6 mmol/L. No significant wt loss noted x 1 year, per stated weight. Measured wt ordered.    Nutrition Related Findings:    Meds/Labs reviewed. Wound Type: None       Current Nutrition Intake & Therapies:    Average Meal Intake: 51-75%, 1-25%  Average Supplements Intake: 26-50%  ADULT ORAL NUTRITION SUPPLEMENT; Dinner, Breakfast, Lunch; Standard High Calorie/High Protein Oral Supplement  ADULT DIET; Regular    Anthropometric Measures:  Height: 175.3 cm (5' 9.02\")  Ideal Body Weight (IBW): 160 lbs (73 kg)       Current Body Weight: 72.6 kg (160 lb 0.9 oz), 100 % IBW. Weight Source: Stated  Current BMI (kg/m2): 23.6  Usual Body Weight: 74.4 kg (164 lb) (11/22/22)  % Weight Change (Calculated): -2.4  Weight Adjustment For: No Adjustment                 BMI Categories: Normal Weight (BMI 18.5-24.9)    Estimated Daily Nutrient Needs:  Energy Requirements Based On: Kcal/kg  Weight Used for Energy Requirements: Current  Energy (kcal/day): 1584-8573 kcal/day  Weight Used for Protein Requirements: Current  Protein (g/day): 75-85 g/day  Method Used for Fluid Requirements: 1 ml/kcal  Fluid (ml/day): 9495-8545 
[facility]  Office: [phone]  Mario Pacheco DO, Titi Soto DO, Aneesh Segundo DO, Ian Parker DO, Willem Rodriguez MD, Mary Troy MD, Ji Stein MD, Adali Gary MD,  Hudson Will MD, India Caruso MD, Ashley Agustin MD,  Hermes Connor DO, Elli Rodriguez MD, Jluis Hernandez MD, Ryan Pacheco DO, Kenzie Ruffin MD,  Sheldon Wall DO, Lluvia Holguin MD, Ella Marshall MD, Samantha Gonzalez MD, Magi Keith MD,  Jj Otero MD, Emili Montenegro MD, Sissy Farmer MD, Neno Weinberg MD, Red Thomason MD, Merna Hood MD, Min Cope DO, Alfa Guzman DO, Elise Kohler MD,  Roshan Hinds MD, Shirley Waterhouse, CNP,  Coty Goodman, CNP, Carlito Tellez, CNP,  Luz Maria Zhou, INGRIS, Flavia Mckinney, CNP, Oneyda Rosen, CNP, Antonia Dempsey CNP, Loren Astorga, CNP, Anaya Dawkins, CNP, Mita Pink, PAPhoebeC, JAGJIT GalvezC, Lyndsay Morrissey, CNP, Wandy Chu, CNS, Wilma Kamara, CNP, Salma Champion, CNP, Tracy Schwab, CNP         [facility]   IN-PATIENT SERVICE   [facility]    Progress Note    1/3/2024    3:11 PM    Name:   Caio Arce  MRN:     [id number]     Acct:      [id number]   Room:   2010/2010-01  IP Day:  24  Admit Date:  12/10/2023  6:59 AM    PCP:   Jaqui Watkins PA-C  Code Status:  DNR-CCA    Subjective:     C/C:   Chief Complaint   Patient presents with    Diarrhea     X3 weeks, only during the day, not at night, denies emesis     Interval History Status: not changed.     Patient examined at bedside  Patient was working with therapy and got anxious.  Patient sat in the chair and gradually improved.  Patient did not get hypoxic during the episode.  Does not report any chest pain.  Patient feels shaky.  Vitals reviewed  Blood pressure around 100-110 systolic.  He saturating 96% on room air  Labs reviewed from electrolytes within normal limits.    Brief History:     83-year-old male past medical history of BPH, 
Critical care team - Resident sign-out to medicine service      Date and time: 12/27/2023 5:52 PM  Patient's name:  Caio Arce  Medical Record Number: [id number]  Patient's account/billing number: [id number]  Patient's YOB: 1940  Age: 83 y.o.  Date of Admission: 12/10/2023  6:59 AM  Length of stay during current admission: 17    Primary Care Physician: Jaqui Watkins PA-C    Code Status: DNR-CCA    Mode of physician to physician communication:        [x] Via telephone   [] In person     Date and time of sign-out: 12/27/2023 5:52 PM    Accepting Internal Medicine : Misti Carrizales    Accepting Medicine team: IM Team Intermed    Accepting team's attending: Dr. Duke Chung    Patient's current ICU Bed:  3007     Patient's assigned bed on floor:  2010        [] Med-Surg Monitored [x] Step-down       [] Psychiatry ICU       [] Psych floor     Reason for ICU admission:     NSTEMI, COVID pneumonia, intubation, PEG    ICU course summary:   NSTEMI, COVID pneumonia    12/15 CT head w/o acute intracranial abnormality. Vitamin K / monitor INR. Monitor for worsening respiratory status. Given 500cc bolus for elevated lactic   12/16 INR 1.6. Started heparin for NSTEMI. Coumadin held due to heparin drip. I marina low, was given 2g calcium gluconate. NG tube placed. Tube feeds started. Neurology consulted for worsening mental status.  12/17 Free water 200 Q6H for hypernatremia. Prolonged QTC, was given 2g magnesium sulfate. Geodon d/c, zyprexa started BID PRN for agitation.  12/20 - insulin dextrose for hyperk,on simple mask,  12/23 Pt placed in MICU, intubated, PEG placement  12/25 Code status changed to DNR-CCA no intubation. Extubated.  12/26 TF inc to 20cc/hr, nutritional consult, free H2O bolus for hypernatremia, PT/OT      Procedures during patient's ICU stay:   none    Current Vitals:     /82   Pulse 97   Temp 98.6 °F (37 °C)   Resp 25   Ht 1.778 m (5' 10\")   Wt 71.5 kg (157 lb 10.1 oz)   SpO2 
EEG complete  
[facility]  PROGRESS NOTE    Shift date: 12.24.2023  Shift day: Sunday   Shift # 2    Room # 3007/3007-01   Name: Caio Arce                Muslim: Mormonism   Place of [place]: unknown    Referral:  Nurse - Emotional Distress and End of Life issues    Admit Date & Time: 12/10/2023  6:59 AM    Assessment:  Caio Arce is a 83 y.o. male in the hospital because of COVID 19 and other breathing issues. Upon entering the room writer observes the family bedside including wife who appears tearful and overwhelmed.  Patient awoke and indicated that he wanted the breathing tube out through head nods.  Patient attempted to talk but was not able to be understood.  Wife became tearful and walked out side with one daughter.  Other daughter remained at bedside and requested prayer.  Patient agreed.   prayed at bedside with the patient and daughter.      Nurse spoke to patient and daughter bedside about possible ween tomorrow morning.  Upon wife's return, she expressed that she would like the tube taken out tonight since that it was the patient wants.   notified the nurse to talk to the patient and family further about wishes and whether patient desires to be extubated tonight or tomorrow.  Nurse will talk to family with wife bedside for clarity.        Intervention:  Writer introduced self and title as  Writer offered space for the wife, patient, and family  to express feelings, needs, and concerns and provided a ministry presence.     Outcome:  Patient appeared to express wishes while wife appears to be ready for an extubation.      Plan:  Chaplains will remain available to offer spiritual and emotional support as needed.      Electronically signed by Chaplain Alona, on 12/24/2023 at 8:38 PM.  [facility]  [phone]       12/24/23 4614   Encounter Summary   Encounter Overview/Reason  Spiritual/Emotional Needs   Service Provided For: 
[facility] Neurology   IN-PATIENT SERVICE   [facility]    Progress note             Date:   12/19/2023  Patient name:  Caio Arce  Date of admission:  12/10/2023  6:59 AM  MRN:   [id number]  Account:  [id number]  YOB: 1940  PCP:    Jaqui Watkins PA-C  Room:   [address] [place] [address]  Code Status:    Full Code    Chief Complaint:     Chief Complaint   Patient presents with    Diarrhea     X3 weeks, only during the day, not at night, denies emesis   Acute Covid 19 encephalopathy     Interval hx:   The Patient was seen and examined at bedside  Is vitally stable appears much more alert today and oriented to person place and city  No acute events overnight  Remains on high flow oxygen non-re breather mask. Appears to be improving significantly with aggressive medical management of his acute covid respiratory failure and sepsis. Infectious disease guiding therapies stopping  Azithromycin, vanco and Zosyn for renal toxicity and hypernatremia and simplified with starting 12/15/23 Cefoxitin 2gm TID X 7 days. Appears patient is responding well to this with slowly improving WBC and encephalopathy      Routine EEG 12/18/23  Interpretation  This EEG was abnormal due to disorganized and slow background in polymorphic theta frequency.    Clinical correlation  This EEG was abnormal. Disorganized and slow background suggested mild to moderate encephalopathy of non specific etiology.   Brief History of Present Illness:   The patient is a 83 y.o.   male who Initially presented to [facility] Emergency Department via EMS 12/10/23 for concern of inability to care for him self an progressive AMS in the setting of acute GI illness with Diarrhea and COVID 19. PMH significant for long term anticoagulation with Coumadin secondary to Mitral valve prolapse S/P Valve replacement 10/14/2014, S/P PPM [facility] MRI non-conditional 10/14/2014, COPD, HTN, HLD, BPH, OA, Anxiety. Patient had few days of 
HR sustained at 140 for at least a minute. Resident notified. One time dose of 2.5 mg lopressor IV push was given.   
INTENSIVE CARE UNIT  Resident Physician Progress Note    Patient - Caio Arce  Date of Admission -  12/10/2023  6:59 AM  Date of Evaluation -  12/15/2023  Room and Bed Number -  3006/3006-01   Hospital Day - 5    HPI:     History was obtained from child and chart review.       Caio Arce is a 83 y.o. male with history of BPH, hypertension, hyperlipidemia, HFrEF EF 45-50 in , mitral valve replacement , on Coumadin with PPM s/p complete heart block.      Patient was admitted on 12/10 due to diarrhea, and myalgia, congestion, cough with greenish phleghm.  He was tested to be COVID positive.     Worsening left hilar infiltrate on chest x-ray, worsening leukocytosis, getting more altered. Critical care paged to assess mentation.     Consult Services: INFECTIOUS DISEASES    SUBJECTIVE:     OVERNIGHT EVENTS:    Patient becoming very agitated and tachypneic when stimulated.      TODAY:    Patient altered, GCS of 10. Very agitated, tachypneic and shaky when stimulated. Requiring Geodon and Precedex for agitation. Cardiology recommending vitamin K for supratherapeutic INR. On 3L NC. Hypernatremic today        ROS:   Review of Systems   Unable to perform ROS: Mental status change        AWAKE & FOLLOWING COMMANDS:  [x] No   [] Yes    SECRETIONS Amount:  [x] Small [] Moderate  [] Large  [] None  Color:     [x] White [] Colored  [] Bloody    SEDATION:  RAAS Score:   [x] Precedex gtt  [] Versed gtt  [] Ativan gtt   [] No Sedation    PARALYZED:  [] No    [] Yes    VASOPRESSORS:  [x] No    [] Yes  [] Levophed [] Dopamine [] Vasopressin  [] Dobutamine [] Phenylephrine [] Epinephrine      OBJECTIVE:     VITAL SIGNS:  BP: Systolic (24hrs), Av , Min:97 , Max:152    Diastolic (24hrs), Av, Min:49, Max:92    HR: Pulse  Av.7  Min: 60  Max: 126  Tmax over 24 hours: Temp (24hrs), Av.9 °F (36.6 °C), Min:96.3 °F (35.7 °C), Max:98.8 °F (37.1 °C)    RR Resp  Av.3  Min: 18  Max: 45  Sat O2% SpO2  Avg: 
INTENSIVE CARE UNIT  Resident Physician Progress Note    Patient - Caio Arce  Date of Admission -  12/10/2023  6:59 AM  Date of Evaluation -  12/18/2023  Room and Bed Number -  3006/3006-01   Hospital Day - 8    HPI:     History was obtained from child and chart review.       Caio Arce is a 83 y.o. male with history of BPH, hypertension, hyperlipidemia, HFrEF EF 45-50 in 2023, mitral valve replacement 2014, on Coumadin with PPM s/p complete heart block.      Patient was admitted on 12/10 due to diarrhea, and myalgia, congestion, cough with greenish phleghm.  He was tested to be COVID positive.     Worsening left hilar infiltrate on chest x-ray, worsening leukocytosis, getting more altered. Critical care paged to assess mentation.     Consult Services: Infectious disease, neurology    SUBJECTIVE:     OVERNIGHT EVENTS:    Patient weaned off of precedex.     TODAY:    AO x 2  Maitaining sat on room air.   Tachypnic  NPO on TF  Had BMs    F.A.S.T. M. H.U.G.S. B.I.D.    Feeding Diet: Diet NPO Exceptions are: Sips of Water with Meds  ADULT TUBE FEEDING; Nasogastric; Diabetic; Continuous; 20; Yes; 20; Q 4 hours; 50; 200; Q 4 hours  Fluids: None  Family: To be updated.   Analgesic: Tylenol PRN  Sedation: Precedex  , off now  Thrombo-prophylaxis: [] Enoxaparin, [] Unfract. Heparin Subcutaneously, [x] Heparin gtt ,EPC Cuffs  Mobility: Poor  Heads up: Elevated   Ulcer prophylaxis: [] PPI Agent, [x] O1Joghk, [] Sucralfate, [] Other:  Glycemic control: Adequate.   Spontaneous breathing trial: NA   Bowel regimen/urine output: 2487/1245. Bmx   Indwelling catheter/lines: Peripheral IV  De-escalation: O2 support.     Sig labs:   Sodium 152  K 4.4  Creat 0.8  Lactate 2.7  WBC 18.4 - 26.9      ROS:   Review of Systems   Unable to perform ROS: Mental status change        AWAKE & FOLLOWING COMMANDS:  [x] No   [] Yes    SECRETIONS Amount:  [x] Small [] Moderate  [] Large  [] None  Color:     [x] White [] Colored  [] 
INTENSIVE CARE UNIT  Resident Physician Progress Note    Patient - Caio Arce  Date of Admission -  12/10/2023  6:59 AM  Date of Evaluation -  12/24/2023  Room and Bed Number -  3007/3007-01   Hospital Day - 14    SUBJECTIVE:     HISTORY OF PRESENT ILLNESS:    Caio Arce is a 83 y.o. male presented initially on 12/10 with diarrhea.  history of BPH, hypertension, hyperlipidemia, HFrEF EF 45-50 in 2023, mitral valve replacement 2014, on Coumadin with PPM s/p complete heart block.      Patient was admitted on 12/10 due to diarrhea, and myalgia, congestion, cough with greenish phleghm.  He was tested to be COVID positive.     Worsening left hilar infiltrate on chest x-ray, worsening leukocytosis, getting more altered. Critical care paged to assess mentation.     He has completed course of remdesivir and Decadron for COVID-19.     Started on cefoxitin 2 g every 8 hours for 7 days due to aspiration pneumonia.     Speech therapy recommends patient to be kept n.p.o. and get a  modified barium swallow study.     Cardiology was consulted for NSTEMI, patient is on heparin drip as per cardiology. They are planning to do an ischemia work-up during this admission.      Ortho was consulted due to septic arthritis bilateral knees, but arthrocentesis fluid is not concerning for septic arthritis. Cultures from right knee does show gram positive arthritis which are likely to be contaminant.      Patient subsequently went to the OR today for a PEG tube/G-tube placement for nutrition as he has failed his swallows.  Patient did complete his COVID-19 isolation.  Patient was unable to be extubated.  Patient therefore transferred to the ICU postop.    CODE STATUS: Full Code    OVERNIGHT EVENTS:      No acute events overnight    TODAY:   Patient seen and examined at bedside.  Patient's oxygen requirement is improved.    General surgery evaluated they state we can use the PEG tube for medications and tube feeds now.  They 
INTENSIVE CARE UNIT  Resident Physician Progress Note    Patient - Caio Arce  Date of Admission -  12/10/2023  6:59 AM  Date of Evaluation -  12/25/2023  Room and Bed Number -  3007/3007-01   Hospital Day - 15    SUBJECTIVE:     HISTORY OF PRESENT ILLNESS:    Caio Arce is a 83 y.o. male presented initially on 12/10 with diarrhea.  history of BPH, hypertension, hyperlipidemia, HFrEF EF 45-50 in 2023, mitral valve replacement 2014, on Coumadin with PPM s/p complete heart block.      Patient was admitted on 12/10 due to diarrhea, and myalgia, congestion, cough with greenish phleghm.  He was tested to be COVID positive.     Worsening left hilar infiltrate on chest x-ray, worsening leukocytosis, getting more altered. Critical care paged to assess mentation.     He has completed course of remdesivir and Decadron for COVID-19.     Started on cefoxitin 2 g every 8 hours for 7 days due to aspiration pneumonia.     Speech therapy recommends patient to be kept n.p.o. and get a  modified barium swallow study.     Cardiology was consulted for NSTEMI, patient is on heparin drip as per cardiology. They are planning to do an ischemia work-up during this admission.      Ortho was consulted due to septic arthritis bilateral knees, but arthrocentesis fluid is not concerning for septic arthritis. Cultures from right knee does show gram positive arthritis which are likely to be contaminant.      Patient subsequently went to the OR today for a PEG tube/G-tube placement for nutrition as he has failed his swallows.  Patient did complete his COVID-19 isolation.  Patient was unable to be extubated.  Patient therefore transferred to the ICU postop.    CODE STATUS: Full Code    OVERNIGHT EVENTS:      No acute events overnight    TODAY:   Patient seen and examined at bedside.  Patient's oxygen requirement is improved.    General surgery evaluated they state we can use the PEG tube for medications and tube feeds now.  They 
INTENSIVE CARE UNIT  Resident Physician Progress Note    Patient - Caio Arce  Date of Admission -  12/10/2023  6:59 AM  Date of Evaluation -  12/26/2023  Room and Bed Number -  3007/3007-01   Hospital Day - 16    SUBJECTIVE:     HISTORY OF PRESENT ILLNESS:    Caio Arce is a 83 y.o. male presented initially on 12/10 with diarrhea.  history of BPH, hypertension, hyperlipidemia, HFrEF EF 45-50 in 2023, mitral valve replacement 2014, on Coumadin with PPM s/p complete heart block.      Patient was admitted on 12/10 due to diarrhea, and myalgia, congestion, cough with greenish phleghm.  He was tested to be COVID positive.     Worsening left hilar infiltrate on chest x-ray, worsening leukocytosis, getting more altered. Critical care paged to assess mentation.     He has completed course of remdesivir and Decadron for COVID-19.     Started on cefoxitin 2 g every 8 hours for 7 days due to aspiration pneumonia.     Speech therapy recommends patient to be kept n.p.o. and get a  modified barium swallow study.     Cardiology was consulted for NSTEMI, patient is on heparin drip as per cardiology. They are planning to do an ischemia work-up during this admission.      Ortho was consulted due to septic arthritis bilateral knees, but arthrocentesis fluid is not concerning for septic arthritis. Cultures from right knee does show gram positive arthritis which are likely to be contaminant.      Patient subsequently went to the OR today for a PEG tube/G-tube placement for nutrition as he has failed his swallows.  Patient did complete his COVID-19 isolation.  Patient was unable to be extubated.  Patient therefore transferred to the ICU postop.    CODE STATUS: DNR-CCA    OVERNIGHT EVENTS:      No acute events overnight    TODAY:   Patient seen and examined at bedside.  Patient's oxygen requirement is improved.  Patient extubated on 12/25 without complications  General surgery evaluated they state we can use the PEG tube 
[facility] - Progress Note  COVID 19 Patient  Today's Date and Time: 12/21/2023, 7:01 AM    Impression :     COVID 19 Confirmed Infection  Covid tests:  Positive Covid Test Date: 12/10/23  Vaccination Status: Unvaccinated individual  Covid pneumonia  Possible secondary aspiration pneumonia  Elevated CRP  Diarrhea x 3 weeks with fecal incontinence  Productive cough  Acute hypoxic respiratory failure  DAMARIS  COPD  BPH  Essential HTN  Paroxysmal Afib  PPM on Coumadin  Hx mitral valve prolapse with valve replacement in 2014    Recommendations:   Antibiotic treatment:  D/C Azithromycin   D/C Vancomycin  D/C Zosyn in view of hypernatremia  Cefoxitin 2 gm IV q 8 hr x 7 days. Start date 12-15-23. Stop  date 12-22-23  Covid Rx:    Remdesivir: Ordered 12/10/23. Completed 12-14-23  Decadron: Started 12-10-23  Actemra: Not indicated  Paxlovid: May be out of window  Monitor CRP    Synovial fluid was not concerning for septic arthritis, but right knee culture is growing few Gram positive cocci in clusters, thought to be a skin contaminant.   No specific antibiotic treatment needed at this time. We will await finalized cultures    Medical Decision Making/Summary/Discussion:12/21/2023     Daily Medical Decision Making Provided by the Attending Physician:    Current Problems:  COVID 19 Confirmed Infection  Covid tests:  Positive Covid Test Date: 12/10/23  Vaccination Status: Unvaccinated individual  Advanced age  Diarrhea x 3 weeks with fecal incontinence  Productive cough  Acute hypoxic respiratory failure  DAMARIS  COPD  BPH  Essential HTN  Paroxysmal Afib  PPM on Coumadin  Hx mitral valve prolapse with valve replacement in 2014    Elements of Medical Decision Making:  Note: I have independently performed the steps listed below as part of the medical decision making and evaluation.   Examined and discussed with patient.  Somnolent  On sedation  Possible aspiration  Continuing supportive 
[facility] - Progress Note  COVID 19 Patient  Today's Date and Time: 12/16/2023, 8:31 PM    Impression :     COVID 19 - 12/10/23  Bilat multifocal  pneumonia - presumed aspiration  CRP elevation - better  on AB and steroids  Diarrhea x 3 weeks with fecal incontinence-  c diff neg  Acute hypoxic respiratory failure  DAMARIS  COPD exacerbation  BPH  Paroxysmal Afib  PPM on Coumadin  Hx mitral valve prolapse with valve replacement in 2014    Recommendations:   Cefoxitin 2 gm IV q 8 hr x 7 days-12-15 till 12-21  Post 5 days Remdesivir:-Completed 12-14-23  Decadron: Started 12-10-23  CRP responded to above treatt    Infection Control Recommendations   Hasty Precautions  Droplet plus isolation until 12/20/23    Antimicrobial Stewardship Recommendations     Simplification of therapy    Chief complaint/reason for consultation:   Concern for COVID infection    History of Present Illness:   Caio Arce is a 83 y.o.-year-old male who was initially admitted on 12/10/2023. Patient seen at the request of Dr. GRACE Reynoso    INITIAL HISTORY:    Patient, with a history of COPD, HTN and PPM on Coumadin, presented through ER with complaints of worsening diarrhea x 3 weeks with decreased oral intake, malaise, productive cough with greenish sputum, and runny nose for the past 5 days.   He denies sick contacts, and the diarrhea has gotten to the point where he has become incontinent of stool.  He denied shortness of breath, chest pain, hematochezia, vomiting or abdominal pain.  He has not received the Covid vaccine, but has received the flu vaccine.     The patient was found to be afebrile with stable vital signs on room air.     Work-up in the ED revealed the patient to be Covid positive.   His lab-work was significant for DAMARIS with a creatinine of 1.4.    A CXR showed mild pulmonary vascular congestion as wall as bibasilar airspace disease.     A CT abd/pelvis revealed no acute abnormalities of 
[facility] - Progress Note  COVID 19 Patient  Today's Date and Time: 12/24/2023, 7:17 AM    Impression :     COVID 19 Confirmed Infection  Covid tests:  Positive Covid Test Date: 12/10/23  Vaccination Status: Unvaccinated individual  Covid pneumonia  Possible secondary aspiration pneumonia  Elevated CRP  Diarrhea x 3 weeks with fecal incontinence  Productive cough  Acute hypoxic respiratory failure  DAMARIS  COPD  BPH  Essential HTN  Paroxysmal Afib  PPM on Coumadin  Hx mitral valve prolapse with valve replacement in 2014    Recommendations:   Antibiotic treatment:  Monitor off antibiotics:  D/C Azithromycin   D/C Vancomycin  D/C Zosyn in view of hypernatremia  Completed Cefoxitin 2 gm IV q 8 hr x 7 days. Start date 12-15-23. Stop  date 12-22-23  Covid Rx:    Remdesivir: Ordered 12/10/23. Completed 12-14-23  Decadron: Started 12-10-23  Actemra: Not indicated  Paxlovid: May be out of window  Monitor CRP    Synovial fluid was not concerning for septic arthritis, but right knee Gram stain showed few Gram positive cocci in clusters. Culture: No growth  No specific antibiotic treatment needed.    Medical Decision Making/Summary/Discussion:12/24/2023     Daily Medical Decision Making Provided by the Attending Physician:    Current Problems:  COVID 19 Confirmed Infection  Covid tests:  Positive Covid Test Date: 12/10/23  Vaccination Status: Unvaccinated individual  Advanced age  Diarrhea x 3 weeks with fecal incontinence  Productive cough  Acute hypoxic respiratory failure  DAMARIS  COPD  BPH  Essential HTN  Paroxysmal Afib  PPM on Coumadin  Hx mitral valve prolapse with valve replacement in 2014    Elements of Medical Decision Making:  Note: I have independently performed the steps listed below as part of the medical decision making and evaluation.   Examined and discussed with patient.  Somnolent  Off sedation  Possible aspiration  Completed antibiotics for aspiration  Changes in Physical 
[facility] - Progress Note  COVID 19 Patient  Today's Date and Time: 12/19/2023, 7:13 AM    Impression :     COVID 19 Confirmed Infection  Covid tests:  Positive Covid Test Date: 12/10/23  Vaccination Status: Unvaccinated individual  Covid pneumonia  Possible secondary aspiration pneumonia  Elevated CRP  Diarrhea x 3 weeks with fecal incontinence  Productive cough  Acute hypoxic respiratory failure  DAMARIS  COPD  BPH  Essential HTN  Paroxysmal Afib  PPM on Coumadin  Hx mitral valve prolapse with valve replacement in 2014    Recommendations:   Antibiotic treatment:  D/C Azithromycin   D/C Vancomycin  D/C Zosyn in view of hypernatremia  Cefoxitin 2 gm IV q 8 hr x 7 days. Start date 12-15-23.   Covid Rx:    Remdesivir: Ordered 12/10/23. Completed 12-14-23  Decadron: Started 12-10-23  Actemra: Not indicated  Paxlovid: May be out of window  Monitor CRP    Medical Decision Making/Summary/Discussion:12/19/2023     Daily Medical Decision Making Provided by the Attending Physician:    Current Problems:  COVID 19 Confirmed Infection  Covid tests:  Positive Covid Test Date: 12/10/23  Vaccination Status: Unvaccinated individual  Advanced age  Diarrhea x 3 weeks with fecal incontinence  Productive cough  Acute hypoxic respiratory failure  DAMARIS  COPD  BPH  Essential HTN  Paroxysmal Afib  PPM on Coumadin  Hx mitral valve prolapse with valve replacement in 2014    Elements of Medical Decision Making:  Note: I have independently performed the steps listed below as part of the medical decision making and evaluation.   Examined and discussed with patient.  Somnolent  On sedation  Possible aspiration  Continuing supportive treatment  Changes in Physical exam  Afebrile  VS stable. Tachycardia improved  Maintaining adequate oxygenation  Changes in ROS  Unchanged  Discussed with referring Physician or service  Dr GRACE Marshall, CC team  Labs, medications, radiologic studies were reviewed with 
[facility] - Progress Note  COVID 19 Patient  Today's Date and Time: 12/11/2023, 9:03 AM    Impression :     COVID 19 Confirmed Infection  Covid tests:  Positive Covid Test Date: 12/10/23  Vaccination Status: Unvaccinated individual  Covid pneumonia  Elevated CRP  Diarrhea x 3 weeks with fecal incontinence  Productive cough  DAMARIS  COPD  BPH  Essential HTN  Paroxysmal Afib  PPM on Coumadin  Hx mitral valve prolapse with valve replacement in 2014    Recommendations:   Antibiotic treatment:  D/C Azithromycin   Covid Rx:    Remdesivir: Ordered 12/10/23- Patient may be out of window for Paxlovid  Decadron: Started 12-10-23  Actemra: Not indicated  Paxlovid: May be out of window  Monitor CRP    Medical Decision Making/Summary/Discussion:12/11/2023     Daily Medical Decision Making Provided by the Attending Physician:    Current Problems:  COVID 19 Confirmed Infection  Covid tests:  Positive Covid Test Date: 12/10/23  Vaccination Status: Unvaccinated individual  Advanced age  Diarrhea x 3 weeks with fecal incontinence  Productive cough  DAMARIS  COPD  BPH  Essential HTN  Paroxysmal Afib  PPM on Coumadin  Hx mitral valve prolapse with valve replacement in 2014    Elements of Medical Decision Making:  Note: I have independently performed the steps listed below as part of the medical decision making and evaluation.   Examined and discussed with patient.  Alert oriented  Afebrile  VS stable  Troubled by diarrhea  Changes in Physical exam  Initial exam  Changes in ROS  Unchanged  Discussed with referring Physician or service  Dr GRACE Reynoso  Labs, medications, radiologic studies were reviewed with personal review of films  Radiologic studies  Lab work  Cultures  Covid positive  C diff: Pending  GI panel: Pending  Large amounts of data were reviewed  Discussed with nursing Staff, Discharge planner  Infection Control and Prevention measures reviewed  Universal precautions   Droplet Plus 
[facility] - Progress Note  COVID 19 Patient  Today's Date and Time: 12/22/2023, 8:15 AM    Impression :     COVID 19 Confirmed Infection  Covid tests:  Positive Covid Test Date: 12/10/23  Vaccination Status: Unvaccinated individual  Covid pneumonia  Possible secondary aspiration pneumonia  Elevated CRP  Diarrhea x 3 weeks with fecal incontinence  Productive cough  Acute hypoxic respiratory failure  DAMARIS  COPD  BPH  Essential HTN  Paroxysmal Afib  PPM on Coumadin  Hx mitral valve prolapse with valve replacement in 2014    Recommendations:   Antibiotic treatment:  D/C Azithromycin   D/C Vancomycin  D/C Zosyn in view of hypernatremia  Cefoxitin 2 gm IV q 8 hr x 7 days. Start date 12-15-23. Stop  date 12-22-23  Covid Rx:    Remdesivir: Ordered 12/10/23. Completed 12-14-23  Decadron: Started 12-10-23  Actemra: Not indicated  Paxlovid: May be out of window  Monitor CRP    Synovial fluid was not concerning for septic arthritis, but right knee culture is growing few Gram positive cocci in clusters, thought to be a skin contaminant.   No specific antibiotic treatment needed at this time. We will await finalized cultures    Medical Decision Making/Summary/Discussion:12/22/2023     Daily Medical Decision Making Provided by the Attending Physician:    Current Problems:  COVID 19 Confirmed Infection  Covid tests:  Positive Covid Test Date: 12/10/23  Vaccination Status: Unvaccinated individual  Advanced age  Diarrhea x 3 weeks with fecal incontinence  Productive cough  Acute hypoxic respiratory failure  DAMARIS  COPD  BPH  Essential HTN  Paroxysmal Afib  PPM on Coumadin  Hx mitral valve prolapse with valve replacement in 2014    Elements of Medical Decision Making:  Note: I have independently performed the steps listed below as part of the medical decision making and evaluation.   Examined and discussed with patient.  Somnolent  On sedation  Possible aspiration  Continuing supportive 
[facility] - Progress Note  COVID 19 Patient  Today's Date and Time: 12/25/2023, 6:05 AM    Impression :     COVID 19 Confirmed Infection  Covid tests:  Positive Covid Test Date: 12/10/23  Vaccination Status: Unvaccinated individual  Covid pneumonia  Possible secondary aspiration pneumonia  Elevated CRP  Diarrhea x 3 weeks with fecal incontinence  Productive cough  Acute hypoxic respiratory failure  DAMARIS  COPD  BPH  Essential HTN  Paroxysmal Afib  PPM on Coumadin  Hx mitral valve prolapse with valve replacement in 2014    Recommendations:   Antibiotic treatment:  Monitor off antibiotics:  D/C Azithromycin   D/C Vancomycin  D/C Zosyn in view of hypernatremia  Completed Cefoxitin 2 gm IV q 8 hr x 7 days. Start date 12-15-23. Stop  date 12-22-23  Covid Rx:    Remdesivir: Ordered 12/10/23. Completed 12-14-23  Decadron: Started 12-10-23  Actemra: Not indicated  Paxlovid: May be out of window  Monitor CRP    Synovial fluid was not concerning for septic arthritis, but right knee Gram stain showed few Gram positive cocci in clusters. Culture: No growth  No specific antibiotic treatment needed.    Medical Decision Making/Summary/Discussion:12/25/2023     Daily Medical Decision Making Provided by the Attending Physician:    Current Problems:  COVID 19 Confirmed Infection  Covid tests:  Positive Covid Test Date: 12/10/23  Vaccination Status: Unvaccinated individual  Advanced age  Diarrhea x 3 weeks with fecal incontinence  Productive cough  Acute hypoxic respiratory failure  DAMARIS  COPD  BPH  Essential HTN  Paroxysmal Afib  PPM on Coumadin  Hx mitral valve prolapse with valve replacement in 2014    Elements of Medical Decision Making:  Note: I have independently performed the steps listed below as part of the medical decision making and evaluation.   Examined and discussed with patient.  Somnolent  Off sedation  Possible aspiration  Completed antibiotics for aspiration  Changes in Physical 
[facility]  Office: [phone]  Mario Pacheco DO, Titi Soto DO, Aneesh Segundo DO, Ian Parker DO, Willem Rodriguez MD, Mary Troy MD, Ji Stein MD, Adali Gary MD,  Hudson Will MD, India Caruso MD, Ashley Agustin MD,  Hermes Connor DO, Elli Rodriguez MD, Jluis Hernandez MD, Ryan Pacheco DO, Kenzie Ruffin MD,  Sheldon Wall DO, Lluvia Holguin MD, Ella Marshall MD, Samantha Gonzalez MD, Magi Keith MD,  Jj Otero MD, Emili Montenegro MD, Sissy Farmer MD, Neno Weinberg MD, Red Thomason MD, Merna Hood MD, Min Cope DO, Alfa Guzman DO, Elise Kohler MD,  Roshan Hinds MD, Shirley Waterhouse, CNP,  Coty Goodman, CNP, Carlito Tellez, CNP,  Lu zMaria Zhou, INGRIS, Flavia Mckinney, CNP, Oneyda Rosen, CNP, Antonia Dempsey CNP, Loren Astorga, CNP, Anaya Dawkins, CNP, Mita Pink, PAPhoebeC, JAGJIT GalvezC, Lyndsay Morrissey, CNP, Wandy Chu, CNS, Wilma Kamara, CNP, Salma Champion, CNP, Tracy Schwab, CNP         [facility]   IN-PATIENT SERVICE   [facility]    Progress Note    12/30/2023    10:56 AM    Name:   Caio Arce  MRN:     [id number]     Acct:      [id number]   Room:   2010/2010-01   Day:  20  Admit Date:  12/10/2023  6:59 AM    PCP:   Jaqui Watkins PA-C  Code Status:  DNR-CCA    Subjective:     C/C:   Chief Complaint   Patient presents with    Diarrhea     X3 weeks, only during the day, not at night, denies emesis     Interval History Status: not changed.     Patient seen and examined at bedside. Doing better. Sitting in chair. Mood improved.     Brief History:     83-year-old male past medical history of BPH, hypertension, hyperlipidemia, systolic heart failure EF 45 to 50%, mitral valve replacement in 2014 on Coumadin, history of permanent pacemaker originally presented diarrhea productive cough, sputum found to be COVID positive. Patient had respiratory distress while being altered and was 
[facility]   Pharmacy Pharmacokinetic Monitoring Service - Vancomycin     Caio Arce is a 83 y.o. male starting on vancomycin therapy for aspiration pneumonia. Pharmacy consulted by Esther Ramos  for monitoring and adjustment.    Target Concentration: Goal AUC/LARRY 400-600 mg*hr/L    Additional Antimicrobials:     Pertinent Laboratory Values:   Wt Readings from Last 1 Encounters:   12/14/23 71.9 kg (158 lb 8.2 oz)     Temp Readings from Last 1 Encounters:   12/14/23 (!) 96.3 °F (35.7 °C) (Oral)     Estimated Creatinine Clearance: 51 mL/min (based on SCr of 1.1 mg/dL).  Recent Labs     12/13/23  0554 12/14/23  1017   CREATININE 0.8 1.1   BUN 39* 63*   WBC 15.1* 17.3*     Procalcitonin:     Pertinent Cultures:  Culture Date Source Results        MRSA Nasal Swab: N/A. Non-respiratory infection.    Plan:  Dosing recommendations based on Bayesian software  Start vancomycin 1250mg q24  Anticipated AUC of 473 and trough concentration of 12 at steady state  Renal labs as indicated   Pharmacy will continue to monitor patient and adjust therapy as indicated    Thank you for the consult,  Nba Salgado RPH  12/14/2023 9:23 PM    
[facility]   Progress Note                   Date:   12/15/2023  Patient name: Caio Arce  Date of admission:  12/10/2023  6:59 AM  MRN:   [id number]  YOB: 1940  PCP: Jaqui Watkins, JASON    Reason for Admission: COVID and NSTEMI     Subjective:   Pt was seen and examined. Vitally stable. Remain in isolation     Medications:   Scheduled Meds:   [Held by provider] phytonadione (ADULT) (VITAMIN K) 10 mg in sodium chloride 0.9 % 100 mL IVPB  10 mg IntraVENous Daily    sodium chloride  500 mL IntraVENous Once    cefOXitin  2,000 mg IntraVENous Q8H    [Held by provider] metoprolol tartrate  12.5 mg Oral BID    ipratropium 0.5 mg-albuterol 2.5 mg  1 Dose Inhalation 4x Daily RT    atorvastatin  20 mg Oral Daily    famotidine  20 mg Oral BID    [Held by provider] aspirin  81 mg Oral Daily    melatonin  6 mg Oral Nightly    dexamethasone  6 mg IntraVENous Q24H    sodium chloride flush  5-40 mL IntraVENous 2 times per day    warfarin placeholder: dosing by pharmacy   Other RX Placeholder     Continuous Infusions:   dextrose 75 mL/hr at 12/15/23 1200    dexmedetomidine 1.1 mcg/kg/hr (12/15/23 1508)    sodium chloride 10 mL/hr at 12/15/23 1411     CBC:   Recent Labs     12/13/23  0554 12/14/23  1017 12/15/23  0300   WBC 15.1* 17.3* 12.1*   HGB 14.3 14.8 12.5*    207 160       BMP:    Recent Labs     12/13/23  0554 12/14/23  1017 12/14/23  1529 12/15/23  0300   * 154* 153* 152*   K 5.0 5.6* 5.1 4.6   * 118* 118* 120*   CO2 17* 22 22 19*   BUN 39* 63*  --  74*   CREATININE 0.8 1.1  --  1.2   GLUCOSE 136* 137*  --  168*       Hepatic:   Recent Labs     12/14/23  1753   AST 66*   ALT 46*   BILITOT 1.2   ALKPHOS 74       Troponin: No results for input(s): \"TROPONINI\" in the last 72 hours.  BNP: No results for input(s): \"BNP\" in the last 72 hours.  Lipids: No results for input(s): \"CHOL\", \"HDL\" in the last 72 hours.    Invalid input(s): \"LDLCALCU\"  INR:   Recent Labs     
[facility]   Progress Note                   Date:   12/18/2023  Patient name: Caio Arce  Date of admission:  12/10/2023  6:59 AM  MRN:   [id number]  YOB: 1940  PCP: Jaqui Watkins PA-C    Reason for Admission: COVID and NSTEMI     Subjective:   Pt was seen and examined. Vitally stable. Remain in isolation. Last INR 1.6. Back on IV heparin    Medications:   Scheduled Meds:   ipratropium 0.5 mg-albuterol 2.5 mg  1 Dose Inhalation Q6H WA RT    [Held by provider] phytonadione (ADULT) (VITAMIN K) 10 mg in sodium chloride 0.9 % 100 mL IVPB  10 mg IntraVENous Daily    cefOXitin  2,000 mg IntraVENous Q8H    [Held by provider] metoprolol tartrate  12.5 mg Oral BID    atorvastatin  20 mg Oral Daily    famotidine  20 mg Oral BID    [Held by provider] aspirin  81 mg Oral Daily    melatonin  6 mg Oral Nightly    dexamethasone  6 mg IntraVENous Q24H    sodium chloride flush  5-40 mL IntraVENous 2 times per day     Continuous Infusions:   [Held by provider] heparin (PORCINE) Infusion 12 Units/kg/hr (12/18/23 1233)    dexmedetomidine Stopped (12/18/23 0311)    sodium chloride 5 mL/hr at 12/18/23 1233     CBC:   Recent Labs     12/16/23  1514 12/17/23  0454 12/18/23  0445 12/18/23  1146   WBC 18.4* 18.4* 26.9*  --    HGB 13.7 13.8 14.5 14.0    150 153  --        BMP:    Recent Labs     12/17/23  0454 12/17/23  1759 12/18/23  0445   * 153* 152*   K 4.9 4.9 4.4   * 117* 118*   CO2 20 21 23   BUN 42* 43* 48*   CREATININE 0.9 0.9 0.8   GLUCOSE 193* 237* 194*       Hepatic:   No results for input(s): \"AST\", \"ALT\", \"ALB\", \"BILITOT\", \"ALKPHOS\" in the last 72 hours.    Troponin: No results for input(s): \"TROPONINI\" in the last 72 hours.  BNP: No results for input(s): \"BNP\" in the last 72 hours.  Lipids: No results for input(s): \"CHOL\", \"HDL\" in the last 72 hours.    Invalid input(s): \"LDLCALCU\"  INR:   Recent Labs     12/16/23  1119   INR 1.6         Objective:   Vitals: BP (!) 
[facility]   Progress Note                   Date:   12/22/2023  Patient name: Caio Arce  Date of admission:  12/10/2023  6:59 AM  MRN:   [id number]  YOB: 1940  PCP: Jaqui Watkins PA-C    Reason for Admission: COVID and NSTEMI     Subjective:   Pt was seen and examined in room. Tx to step-down bed. Currently he is alert and cooperative. Has Flexi-flow in place. Denies any pain. States breathing is \"getting better.\" On NC oxygen without distress. Very hoarse voice    Medications:   Scheduled Meds:   insulin lispro  0-4 Units SubCUTAneous Q6H    ipratropium 0.5 mg-albuterol 2.5 mg  1 Dose Inhalation Q6H WA RT    [Held by provider] metoprolol tartrate  12.5 mg Oral BID    atorvastatin  20 mg Oral Daily    famotidine  20 mg Oral BID    [Held by provider] aspirin  81 mg Oral Daily    melatonin  6 mg Oral Nightly    sodium chloride flush  5-40 mL IntraVENous 2 times per day     Continuous Infusions:   dextrose      dextrose      heparin (PORCINE) Infusion 18 Units/kg/hr (12/22/23 0649)    dexmedetomidine Stopped (12/21/23 1542)    sodium chloride 10 mL/hr at 12/21/23 1854     CBC:   Recent Labs     12/20/23  0759 12/21/23  0416 12/22/23  0529   WBC 11.6* 13.5* 19.5*   HGB 12.1* 12.2* 15.2   PLT See Reflexed IPF Result 104* 143       BMP:    Recent Labs     12/21/23  0416 12/22/23  0109 12/22/23  0529    136 140   K 5.1 4.6 4.6   * 104 106   CO2 23 24 24   BUN 46* 33* 31*   CREATININE 0.7 0.6* 0.6*   GLUCOSE 153* 127* 132*       Hepatic:   No results for input(s): \"AST\", \"ALT\", \"ALB\", \"BILITOT\", \"ALKPHOS\" in the last 72 hours.    Troponin: No results for input(s): \"TROPONINI\" in the last 72 hours.  BNP: No results for input(s): \"BNP\" in the last 72 hours.  Lipids: No results for input(s): \"CHOL\", \"HDL\" in the last 72 hours.    Invalid input(s): \"LDLCALCU\"  INR:   No results for input(s): \"INR\" in the last 72 hours.      Objective:   Vitals: BP (!) 143/72   Pulse 88  
Noted [facility] nurse referral for coccygeal pressure injury. Photo reviewed and appears to be deep tissue pressure injury.  Writer phoned bedside RN to review plan of care.  Zinc oxide in use.  Pressure injury prevention measures in place. Will evaluate at next opportunity tomorrow.  
Occupational Therapy    [facility]  Occupational Therapy Not Seen Note    DATE: 2023    NAME: Caio Arce  MRN: [id number]   : 1940      Patient not seen this date for Occupational Therapy due to:    Patient is not appropriate for active participation in OT evaluation/treatment at this time d/t decrease in mentation per RN pt not following commands    Next Scheduled Treatment: Attempt     Electronically signed by DINORAH Aguilar on 2023 at 11:49 AM    
Occupational Therapy    [facility]  Occupational Therapy Not Seen Note    DATE: 12/15/2023    NAME: Caio Arce  MRN: [id number]   : 1940      Patient not seen this date for Occupational Therapy due to:    Not appropriate d/t easily agitated, HR jumpers to 150's with agitation in supine position per RN.     Electronically signed by DINORAH Thornton on 12/15/2023 at 2:12 PM   
Occupational Therapy    [facility]  Occupational Therapy Not Seen Note    DATE: 2023    NAME: Caio Arce  MRN: [id number]   : 1940      Patient not seen this date for Occupational Therapy due to:    Patient is not appropriate for active participation in OT treatment at this time d/t elevated RR (pt visibly struggling), low O2 saturation, audibly congested. Pt placed on O2 NC 3 Lm, RN notified.    Next Scheduled Treatment:     Electronically signed by DINORAH Cota on 2023 at 3:57 PM   
Occupational Therapy    [facility]  Occupational Therapy Not Seen Note    DATE: 2023    NAME: Caio Arce  MRN: [id number]   : 1940      Patient not seen this date for Occupational Therapy due to: I&S    Next Scheduled Treatment:  23    Electronically signed by DINORAH BOLIVAR on 2023 at 4:40 PM    
Occupational Therapy    [facility]  Occupational Therapy Not Seen Note    DATE: 2023    NAME: Caio Arce  MRN: [id number]   : 1940      Patient not seen this date for Occupational Therapy due to:    Surgery/Procedure: Pt off floor for EGD Diagnostic. Will continue as able.    Electronically signed by DINORAH Maldonado on 2023 at 3:50 PM    
Occupational Therapy  Facility/Department: [facility]- [facility]  Occupational Therapy Daily Treatment Note      Name: Caio Arce  : 1940  MRN: [id number]  Date of Service: 2024    Discharge Recommendations:  Patient would benefit from continued therapy after discharge          Patient Diagnosis(es): The primary encounter diagnosis was COVID-19. Diagnoses of Paroxysmal atrial fibrillation (HCC), Sick sinus syndrome (HCC), DAMARIS (acute kidney injury) (HCC), H/O mitral valve replacement with mechanical valve, and Oropharyngeal dysphagia were also pertinent to this visit.  Past Medical History:  has a past medical history of Anxiety, Arthritis, BPH (benign prostatic hyperplasia), Constipation, COPD (chronic obstructive pulmonary disease) (HCC), Current use of aspirin, Hyperglycemia, Hyperlipidemia, Hypertension, MVP (mitral valve prolapse), Osteoarthritis, Reflux, and Wears glasses.  Past Surgical History:  has a past surgical history that includes pacemaker placement (10/14/2014); Inguinal hernia repair (Bilateral, 94, 96); Cardiac valve replacement (10/14/2014); other surgical history (Left, 2014); Upper gastrointestinal endoscopy (N/A, 2023); and Gastrostomy tube placement (N/A, 2023).           Assessment   Performance deficits / Impairments: Decreased functional mobility ;Decreased ADL status;Decreased cognition;Decreased safe awareness;Decreased strength;Decreased balance;Decreased endurance;Decreased ROM;Decreased high-level IADLs;Decreased coordination;Decreased posture  Assessment: Pt would continue to benefit from acute OT services to address deficits listed above and improve functional performance prior to discharge.  Prognosis: Good  Activity Tolerance  Activity Tolerance: Patient Tolerated treatment well;Patient limited by fatigue        Plan   Occupational Therapy Plan  Times Per Week: 3-5x/wk  Current Treatment Recommendations: Strengthening, Balance training, Functional 
Occupational Therapy  Facility/Department: [facility]  Occupational Therapy Daily Treatment Note      Name: Caio Arce  : 1940  MRN: [id number]  Date of Service: 2024    Discharge Recommendations:  Patient would benefit from continued therapy after discharge          Patient Diagnosis(es): The primary encounter diagnosis was COVID-19. Diagnoses of Paroxysmal atrial fibrillation (HCC), Sick sinus syndrome (HCC), DAMARIS (acute kidney injury) (HCC), H/O mitral valve replacement with mechanical valve, and Oropharyngeal dysphagia were also pertinent to this visit.  Past Medical History:  has a past medical history of Anxiety, Arthritis, BPH (benign prostatic hyperplasia), Constipation, COPD (chronic obstructive pulmonary disease) (HCC), Current use of aspirin, Hyperglycemia, Hyperlipidemia, Hypertension, MVP (mitral valve prolapse), Osteoarthritis, Reflux, and Wears glasses.  Past Surgical History:  has a past surgical history that includes pacemaker placement (10/14/2014); Inguinal hernia repair (Bilateral, 94, 96); Cardiac valve replacement (10/14/2014); other surgical history (Left, 2014); Upper gastrointestinal endoscopy (N/A, 2023); and Gastrostomy tube placement (N/A, 2023).           Assessment   Performance deficits / Impairments: Decreased functional mobility ;Decreased ADL status;Decreased cognition;Decreased safe awareness;Decreased strength;Decreased balance;Decreased endurance;Decreased ROM;Decreased high-level IADLs;Decreased coordination;Decreased posture  Assessment: Pt would continue to benefit from acute OT services to address deficits listed above and improve functional performance prior to discharge.  Prognosis: Good  Activity Tolerance  Activity Tolerance: Patient Tolerated treatment well;Patient limited by fatigue;Treatment limited secondary to decreased cognition        Plan   Occupational Therapy Plan  Times Per Week: 3-5x/wk  Current Treatment 
Occupational Therapy  Facility/Department: [facility]  Occupational Daily Treatment Note    Name: Caio Arce  : 1940  MRN: [id number]  Date of Service: 2023    Discharge Recommendations:  Patient would benefit from continued therapy after discharge  Patient Diagnosis(es): The primary encounter diagnosis was COVID-19. Diagnoses of Paroxysmal atrial fibrillation (HCC), Sick sinus syndrome (HCC), DAMARIS (acute kidney injury) (HCC), H/O mitral valve replacement with mechanical valve, and Oropharyngeal dysphagia were also pertinent to this visit.  Past Medical History:  has a past medical history of Anxiety, Arthritis, BPH (benign prostatic hyperplasia), Constipation, COPD (chronic obstructive pulmonary disease) (HCC), Current use of aspirin, Hyperglycemia, Hyperlipidemia, Hypertension, MVP (mitral valve prolapse), Osteoarthritis, Reflux, and Wears glasses.  Past Surgical History:  has a past surgical history that includes pacemaker placement (10/14/2014); Inguinal hernia repair (Bilateral, 94, 96); Cardiac valve replacement (10/14/2014); other surgical history (Left, 2014); Upper gastrointestinal endoscopy (N/A, 2023); and Gastrostomy tube placement (N/A, 2023).  Assessment   Performance deficits / Impairments: Decreased functional mobility ;Decreased ADL status;Decreased cognition;Decreased safe awareness;Decreased strength;Decreased balance;Decreased endurance;Decreased ROM;Decreased high-level IADLs;Decreased coordination;Decreased posture  Prognosis: Good  Activity Tolerance  Activity Tolerance: Patient Tolerated treatment well        Plan   Occupational Therapy Plan  Times Per Week: 3-5x/wk  Current Treatment Recommendations: Strengthening, Balance training, Functional mobility training, Wheelchair mobility training, Patient/Caregiver education & training, Safety education & training, Self-Care / ADL, Home management training, Equipment evaluation, education, & 
Occupational Therapy  Facility/Department: [facility]- [facility]  Occupational Daily Treatment Note    Name: Caio Arce  : 1940  MRN: [id number]  Date of Service: 2023    Discharge Recommendations:  Patient would benefit from continued therapy after discharge  Patient Diagnosis(es): The primary encounter diagnosis was COVID-19. Diagnoses of Paroxysmal atrial fibrillation (HCC), Sick sinus syndrome (HCC), DAMARIS (acute kidney injury) (HCC), H/O mitral valve replacement with mechanical valve, and Oropharyngeal dysphagia were also pertinent to this visit.  Past Medical History:  has a past medical history of Anxiety, Arthritis, BPH (benign prostatic hyperplasia), Constipation, COPD (chronic obstructive pulmonary disease) (HCC), Current use of aspirin, Hyperglycemia, Hyperlipidemia, Hypertension, MVP (mitral valve prolapse), Osteoarthritis, Reflux, and Wears glasses.  Past Surgical History:  has a past surgical history that includes pacemaker placement (10/14/2014); Inguinal hernia repair (Bilateral, 94, 96); Cardiac valve replacement (10/14/2014); other surgical history (Left, 2014); Upper gastrointestinal endoscopy (N/A, 2023); and Gastrostomy tube placement (N/A, 2023).  Assessment   Performance deficits / Impairments: Decreased functional mobility ;Decreased ADL status;Decreased cognition;Decreased safe awareness;Decreased strength;Decreased balance;Decreased endurance;Decreased ROM;Decreased high-level IADLs;Decreased coordination;Decreased posture  Prognosis: Good  Activity Tolerance  Activity Tolerance: Patient Tolerated treatment well;Patient limited by fatigue        Plan   Occupational Therapy Plan  Times Per Week: 3-5x/wk  Current Treatment Recommendations: Strengthening, Balance training, Functional mobility training, Wheelchair mobility training, Patient/Caregiver education & training, Safety education & training, Self-Care / ADL, Home management training, Equipment 
[facility]   IN-PATIENT SERVICE   [facility]    Progress note             Date:   12/18/2023  Patient name:  Caio Arce  Date of admission:  12/10/2023  6:59 AM  MRN:   [id number]  Account:  [id number]  YOB: 1940  PCP:    Jaqui Watkins PA-C  Room:   [address] [place] [address]  Code Status:    Full Code    Chief Complaint:     Chief Complaint   Patient presents with    Diarrhea     X3 weeks, only during the day, not at night, denies emesis   Acute Covid 19 encephalopathy     Interval hx:   The Patient was seen and examined at bedside  Is vitally stable -168, HR , respiratory rate elevated 23-32 on 10L/min Non-Re breather mask afebile Tmax last 24 hours 98.0.   No acute events overnight  Patient Undergoing 30 minute routine EEG at bedside today. Acute respiratory distress related to COVID pneumonia on 10L/min high flow O2. Unable to tolerate laying flat at this time and unstable for IR guided Lumbar puncture. Extensive discussion with primary team and patients family at bedside. Will cancel IR guided LP at this time, feel it is low yield and given unstable not worth the elevated risk. Will continue to monitor patients mental status and give him time with aggressive medical support. Appears consistent with acute metabolic encephalopathy and probable underlying MCI/ Dementia not previously worked up. Otherwise neurologically stable family at bedside feel he is more awake and alert today and slowly improving from yesterdays exam.   Brief History of Present Illness:     The patient is a 83 y.o.   male who Initially presented to [facility] Emergency Department via EMS 12/10/23 for concern of inability to care for him self an progressive AMS in the setting of acute GI illness with Diarrhea and COVID 19. PMH significant for long term anticoagulation with Coumadin secondary to Mitral valve prolapse S/P Valve replacement 10/14/2014, S/P PPM [facility] 
Order obtained for extubation.  SpO2 of 100 on 30% FiO2.   Patient extubated and placed on 3 liters/min via nasal cannula.   Post extubation SpO2 is 100% with HR  61 bpm and RR 20 breaths/min.    Patient had mild cough that was non-productive.  Extubation Well tolerated by patient..   Breath Sounds: clear    CHIDI REAL RCP   2:23 PM  
[facility]  Office: [phone]  Mario Pacheco DO, Titi Soto DO, Aneesh Segundo DO, Ian Parker DO, Willem Rodriguez MD, Mary Troy MD, Ji Stein MD, Adali Gary MD,  Hudson Will MD, India Caruso MD, Ashley Agustin MD,  Hermes Connor DO, Elli Rodriguez MD, Jluis Hernandez MD, Ryan Pacheco DO, Kenzie Ruffin MD,  Sheldon Wall DO, Lluvia Holguin MD, Ella Marshall MD, Samantha Gonzalez MD, Magi Keith MD,  Jj Otero MD, Emili Montenegro MD, Sissy Farmer MD, Neno Weinberg MD, Red Thomason MD, Merna Hood MD, Min Cope DO, Alfa Guzman DO, Elise Kohler MD,  Roshan Hinds MD, Shirley Waterhouse, CNP,  Coty Goodman, CNP, Carlito Tellez, CNP,  Luz Maria Zhou, INGRIS, Flavia Mckinney, CNP, Oneyda Rosen, CNP, Antonia Dempsey CNP, Loren Astorga, CNP, Anaya Dawkins, CNP, Mita Pink PAPhoebeC, JAGJIT GalvezC, Lyndsay Morrissey, CNP, Wandy Chu, CNS, Wilma Kamara, CNP, Salma Champion, CNP, Tracy Schwab, CNP         [facility]   IN-PATIENT SERVICE   [facility]    Progress Note    1/1/2024    10:33 AM    Name:   Caio Arce  MRN:     [id number]     Acct:      [id number]   Room:   2010/2010-01  IP Day:  22  Admit Date:  12/10/2023  6:59 AM    PCP:   Jaqui Watkins PA-C  Code Status:  DNR-CCA    Subjective:     C/C:   Chief Complaint   Patient presents with    Diarrhea     X3 weeks, only during the day, not at night, denies emesis     Interval History Status: not changed.     Patient seen and examined at bedside. Family at bedside. Feeling well. In good spirits today.     Brief History:     83-year-old male past medical history of BPH, hypertension, hyperlipidemia, systolic heart failure EF 45 to 50%, mitral valve replacement in 2014 on Coumadin, history of permanent pacemaker originally presented diarrhea productive cough, sputum found to be COVID positive. Patient had respiratory distress while being altered and was 
[facility]  Office: [phone]  Mario Pacheco DO, Titi Soto DO, Aneesh Segundo DO, Ian Parker DO, Willem Rodriguez MD, Mary Troy MD, Ji Stein MD, Adali Gary MD,  Hudson Will MD, India Caruso MD, Ashley Agustin MD,  Hermes Connor DO, Elli Rodriguez MD, Jluis Hernandez MD, Ryan Pacheco DO, Kenzie Ruffin MD,  Sheldon Wall DO, Lluvia Holguin MD, Ella Marshall MD, Samantha Gonzalez MD, Magi Keith MD,  Jj Otero MD, Emili Montenegro MD, Sissy Farmer MD, Neno Weinberg MD, Red Thomason MD, Merna Hood MD, Min Cope DO, Alfa Guzman DO, Elise Kohler MD,  Roshan Hinds MD, Shirley Waterhouse, CNP,  Coty Goodman, CNP, Carlito Tellez, CNP,  Luz Maria Zhou, INGRIS, Flavia Mckinney, CNP,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  : 1940  MRN: [id number]  Date of Service: 2023    Discharge Recommendations:  Patient would benefit from continued therapy after discharge   PT Equipment Recommendations  Equipment Needed: No      Patient Diagnosis(es): The primary encounter diagnosis was COVID-19. Diagnoses of Paroxysmal atrial fibrillation (HCC), Sick sinus syndrome (HCC), and DAMARIS (acute kidney injury) (HCC) were also pertinent to this visit.  Past Medical History:  has a past medical history of Anxiety, Arthritis, BPH (benign prostatic hyperplasia), Constipation, COPD (chronic obstructive pulmonary disease) (HCC), Current use of aspirin, Hyperglycemia, Hyperlipidemia, Hypertension, MVP (mitral valve prolapse), Osteoarthritis, Reflux, and Wears glasses.  Past Surgical History:  has a past surgical history that includes pacemaker placement (10/14/2014); Inguinal hernia repair (Bilateral, 94, 96); Cardiac valve replacement (10/14/2014); other surgical history (Left, 2014); Upper gastrointestinal endoscopy (N/A, 2023); and Gastrostomy tube placement (N/A, 2023).    Assessment   Body Structures, Functions, Activity Limitations Requiring Skilled Therapeutic Intervention: Decreased functional mobility ;Decreased strength;Decreased safe awareness;Decreased cognition;Decreased endurance  Assessment: Pt required maxA x2 to perform bed mobility, and was able to tolerate sitting EOB ~10 minutes with min-maxA for balance. Pt most limited by decreased balance, strength, endurance and cognition. He would benefit from continued PT to address deficits and maximzie safety and independence with mobility.  Therapy Prognosis: Fair  Requires PT Follow-Up: Yes  Activity Tolerance  Activity Tolerance: Patient limited by endurance;Patient limited by fatigue     Plan   Physical Therapy Plan  General Plan:  
Physical Therapy  Facility/Department: [facility] CAR 2- STEPDOWN  Daily Treatment Note    Name: Caio Arce  : 1940  MRN: [id number]  Date of Service: 2024    Discharge Recommendations:  Patient would benefit from continued therapy after discharge   PT Equipment Recommendations  Equipment Needed: No      Patient Diagnosis(es): The primary encounter diagnosis was COVID-19. Diagnoses of Paroxysmal atrial fibrillation (HCC), Sick sinus syndrome (HCC), DAMARIS (acute kidney injury) (HCC), H/O mitral valve replacement with mechanical valve, and Oropharyngeal dysphagia were also pertinent to this visit.  Past Medical History:  has a past medical history of Anxiety, Arthritis, BPH (benign prostatic hyperplasia), Constipation, COPD (chronic obstructive pulmonary disease) (HCC), Current use of aspirin, Hyperglycemia, Hyperlipidemia, Hypertension, MVP (mitral valve prolapse), Osteoarthritis, Reflux, and Wears glasses.  Past Surgical History:  has a past surgical history that includes pacemaker placement (10/14/2014); Inguinal hernia repair (Bilateral, 94, 96); Cardiac valve replacement (10/14/2014); other surgical history (Left, 2014); Upper gastrointestinal endoscopy (N/A, 2023); and Gastrostomy tube placement (N/A, 2023).    Assessment   Body Structures, Functions, Activity Limitations Requiring Skilled Therapeutic Intervention: Decreased functional mobility ;Decreased strength;Decreased safe awareness;Decreased cognition;Decreased endurance  Assessment: Pt presentin with mobility deficits requiring modA x 2 intially then improving to Shaq x 2 with RW to ambulate 5 ft. Pt required Shaq x 2 for sit<>stand transfers. Pt is currently unsafe to return to prior living arrangements due to high fall risk. Pt would benefit from continued PT following discharge to address functioanl deficits.  Therapy Prognosis: Fair  Activity Tolerance  Activity Tolerance: Patient limited by endurance;Patient limited by 
Physical Therapy  Facility/Department: [facility] CAR 2- STEPDOWN  Physical Therapy Daily Treatment Note    Name: Caio Arce  : 1940  MRN: [id number]  Date of Service: 2023    Discharge Recommendations:  Patient would benefit from continued therapy after discharge   PT Equipment Recommendations  Equipment Needed: No  Other: continue to assess.      Patient Diagnosis(es): The primary encounter diagnosis was COVID-19. Diagnoses of Paroxysmal atrial fibrillation (HCC), Sick sinus syndrome (HCC), DAMARIS (acute kidney injury) (HCC), H/O mitral valve replacement with mechanical valve, and Oropharyngeal dysphagia were also pertinent to this visit.  Past Medical History:  has a past medical history of Anxiety, Arthritis, BPH (benign prostatic hyperplasia), Constipation, COPD (chronic obstructive pulmonary disease) (HCC), Current use of aspirin, Hyperglycemia, Hyperlipidemia, Hypertension, MVP (mitral valve prolapse), Osteoarthritis, Reflux, and Wears glasses.  Past Surgical History:  has a past surgical history that includes pacemaker placement (10/14/2014); Inguinal hernia repair (Bilateral, 94, 96); Cardiac valve replacement (10/14/2014); other surgical history (Left, 2014); Upper gastrointestinal endoscopy (N/A, 2023); and Gastrostomy tube placement (N/A, 2023).    Assessment   Body Structures, Functions, Activity Limitations Requiring Skilled Therapeutic Intervention: Decreased functional mobility ;Decreased strength;Decreased safe awareness;Decreased cognition;Decreased endurance  Assessment: Pt required modA for bed mobility for trunk progression/ stability. Pt sat EOB for 10-15 mins with CGA with no LOB noted. Pt performed STS transfer x2 with modA x2 on 1st attempt and Shaq on 2nd attempt using the SS along with 2L of O2 via NC. Pt demos a standing tolerance of 1-2 mins on each attempt before requiring a seated rest break. Pt becomes SOB with activity but SPO2 remained >90%. Pt is 
Physical Therapy  Facility/Department: [facility] CAR 2- STEPDOWN  Daily Treatment Note    Name: Caio Arce  : 1940  MRN: [id number]  Date of Service: 1/3/2024    Discharge Recommendations:  Patient would benefit from continued therapy after discharge   PT Equipment Recommendations  Equipment Needed: No      Patient Diagnosis(es): The primary encounter diagnosis was COVID-19. Diagnoses of Paroxysmal atrial fibrillation (HCC), Sick sinus syndrome (HCC), DAMARIS (acute kidney injury) (HCC), H/O mitral valve replacement with mechanical valve, and Oropharyngeal dysphagia were also pertinent to this visit.  Past Medical History:  has a past medical history of Anxiety, Arthritis, BPH (benign prostatic hyperplasia), Constipation, COPD (chronic obstructive pulmonary disease) (HCC), Current use of aspirin, Hyperglycemia, Hyperlipidemia, Hypertension, MVP (mitral valve prolapse), Osteoarthritis, Reflux, and Wears glasses.  Past Surgical History:  has a past surgical history that includes pacemaker placement (10/14/2014); Inguinal hernia repair (Bilateral, 94, 96); Cardiac valve replacement (10/14/2014); other surgical history (Left, 2014); Upper gastrointestinal endoscopy (N/A, 2023); and Gastrostomy tube placement (N/A, 2023).    Assessment   Body Structures, Functions, Activity Limitations Requiring Skilled Therapeutic Intervention: Decreased functional mobility ;Decreased strength;Decreased safe awareness;Decreased cognition;Decreased endurance  Assessment: Pt able to complete bed mobility this date with SBA, Pt required Shaq to perform sit<>stand. Pt ambulated 5 ft seated rest break and then 3 ft with RW and Shaq. Pt limited by anxiety of abilities this date. Pt would benefit from therapy following d/c to address strength, balance, and tolerance to mobility.  Therapy Prognosis: Fair  Activity Tolerance  Activity Tolerance: Patient limited by endurance;Patient limited by fatigue     Plan   Physical 
Physical Therapy  Facility/Department: [facility] CAR 2- STEPDOWN  Physical Therapy Daily Treatment Note    Name: Caio Arce  : 1940  MRN: [id number]  Date of Service: 2024    Discharge Recommendations:  Patient would benefit from continued therapy after discharge   PT Equipment Recommendations  Equipment Needed: No      Patient Diagnosis(es): The primary encounter diagnosis was COVID-19. Diagnoses of Paroxysmal atrial fibrillation (HCC), Sick sinus syndrome (HCC), DAMARIS (acute kidney injury) (HCC), H/O mitral valve replacement with mechanical valve, and Oropharyngeal dysphagia were also pertinent to this visit.  Past Medical History:  has a past medical history of Anxiety, Arthritis, BPH (benign prostatic hyperplasia), Constipation, COPD (chronic obstructive pulmonary disease) (HCC), Current use of aspirin, Hyperglycemia, Hyperlipidemia, Hypertension, MVP (mitral valve prolapse), Osteoarthritis, Reflux, and Wears glasses.  Past Surgical History:  has a past surgical history that includes pacemaker placement (10/14/2014); Inguinal hernia repair (Bilateral, 94, 96); Cardiac valve replacement (10/14/2014); other surgical history (Left, 2014); Upper gastrointestinal endoscopy (N/A, 2023); and Gastrostomy tube placement (N/A, 2023).    Assessment   Body Structures, Functions, Activity Limitations Requiring Skilled Therapeutic Intervention: Decreased functional mobility ;Decreased strength;Decreased safe awareness;Decreased cognition;Decreased endurance  Assessment: Pt able to complete bed mobility this date with SBA, Pt required Shaq to perform sit<>stand. Pt ambulated 5ft forward, 5 ft retro, seated rest break 3ft side step. Needs assist with maneuvering RW. pt has high anxiety, needing vc and reassurance.  Pt limited by anxiety of abilities this date. Pt would benefit from therapy following d/c to address strength, balance, and tolerance to mobility.  Therapy Prognosis: Fair  Activity 
Physical Therapy  Facility/Department: [facility] CAR 3- MICU  Daily Treatment Note    Name: Caio Arce  : 1940  MRN: [id number]  Date of Service: 2023    Discharge Recommendations:  Patient would benefit from continued therapy after discharge          Patient Diagnosis(es): The primary encounter diagnosis was COVID-19. Diagnoses of Paroxysmal atrial fibrillation (HCC), Sick sinus syndrome (HCC), and DAMARIS (acute kidney injury) (HCC) were also pertinent to this visit.  Past Medical History:  has a past medical history of Anxiety, Arthritis, BPH (benign prostatic hyperplasia), Constipation, COPD (chronic obstructive pulmonary disease) (HCC), Current use of aspirin, Hyperglycemia, Hyperlipidemia, Hypertension, MVP (mitral valve prolapse), Osteoarthritis, Reflux, and Wears glasses.  Past Surgical History:  has a past surgical history that includes pacemaker placement (10/14/2014); Inguinal hernia repair (Bilateral, 94, 96); Cardiac valve replacement (10/14/2014); and other surgical history (Left, 2014).    Assessment   Body Structures, Functions, Activity Limitations Requiring Skilled Therapeutic Intervention: Decreased functional mobility ;Decreased strength;Decreased safe awareness;Decreased cognition;Decreased endurance  Assessment: Pt completed x 2 sit<>stands this date requiring modA x 2 with RW, Pts 1st trial 15 sec, 2nd trial 30 sec. Pt required maxA x 2 for supine<>sit, Pt initially maxA to maintain EOB improving to CGA. Pt would benefit from therapy following d/c to address deficits in balance, strength, and tolerance to acitivity to promote return to PLOF.  Therapy Prognosis: Good  Activity Tolerance  Activity Tolerance: Patient limited by fatigue;Patient limited by endurance     Plan   Physical Therapy Plan  General Plan:  (5-6x wk)  Current Treatment Recommendations: Strengthening, Functional mobility training, Transfer training, Endurance training, Stair training, Gait training, Cognitive 
Pt. Pulled out left peripheral IV up unassisted with bed alarm sounding. Tele sitter initiated.  
Pt. with  increased restless, agitation, and hallucinations tearing off telemetry leads for monitoring. Writer notifying  on-call resident for assistance, with resident at bedside, x 1 dose of 5 mg Zyprexa ordered. Pt. Allowing telemetry to be applied following the x 1 dose of Zyprexa with Pt.'s telemetry readings now showing V-tach intermittently with writer unable to confirm telemetry reading of V-tach by EKG due to patient's behaviors and unwillingness to cooperate as it relates to restlessness and agitation. Writer asking charge nurse to call RRT for assistance due to pt.'s change in condition. An attempt to retry EKG during Rapid assistance was also unsuccessful.  ABGs obtained. Second dose of Zyprexa 5 mg ordered and administered, Melatonin 6 mg ordered and administered all with positive results for this pt.'s restlessness and agitation. Pt. Returning to SR on telemetry and resting comfortably   
RN concerned for patient lower extremity color. RN notice from knees down patient is mottled and purple in color. Pt worked with patient to dangle on side of bed and he became in distress, very tachypnic and decreased O2. Perfect serve sent to dr. Pacheco informing of concerns. Was addressed to have critical care come take a look at patient. Perfect serve sent to critical care resident. Dr. Ramos came bedside to assess patient. Stated no concerns at this time. Vitals are stable. RN Will continue to monitor.   
RT asked for patient to be moved to ICU d/t increase RR with movement or agitation. Resident was walking by, and will talk over with Primary Physician.   RT trying to do a breathing treatment now with patient; patient is anxious.   
Report called to [facility] Car 3, room 3006. RT was asked to accompany patient to unit. Placed on NRB per RT, SpO2 100%. All belongings are with wife and daughter who followed to unit. Sitter also followed with patient   
Report called to [facility]. Unable to place Peg tube.  Possibly scheduling for open G-tube, awaiting to hear if surgery is a go.  
Report called to [facility], IV removed. Wife updated. No further questions at this time   
SLP ALL NOTES  [facility]  Speech Language Pathology    Date: 12/18/2023  Patient Name: Caio Arce  YOB: 1940   AGE: 83 y.o.  MRN: [id number]        Patient Not Available for Speech Therapy     Due to:  [] Testing  [] Hemodialysis  [] Cancelled by RN  [] Surgery   [] Intubation/Sedation/Pain Medication  [] Medical instability  [x] Other: Spoke with RN, pt not appropriate for swallow evaluation at this time.    Next scheduled treatment: PM as appropriate or 12/19/23  Completed by: Chanell Russo, SLP, M.S. [facility]-SLP    
SLP ALL NOTES  Facility/Department: [facility] 3- MICU   CLINICAL BEDSIDE SWALLOW EVALUATION    NAME: Caio Arce  : 1940  MRN: [id number]    ADMISSION DATE: 12/10/2023  ADMITTING DIAGNOSIS: has Arthritis; COPD (chronic obstructive pulmonary disease) ([facility]); Reflux; Hypertension; Hyperlipidemia; Hyperglycemia; Presence of cardiac pacemaker; Groin mass; Hyperkalemia; Varicose veins; Orthostasis; Hypotension due to drugs; Vertigo; Anticoagulant long-term use; History of mitral valve replacement; Pacemaker failure, initial encounter; Complete heart block (HCC); Nicotine dependence, uncomplicated; Sick sinus syndrome ([facility]); Paroxysmal atrial fibrillation ([facility]); Hypokalemia; Hypocalcemia; Pacemaker at end of battery life; COVID-19; Pneumonia due to COVID-19 virus; Elevated troponin; Unvaccinated for covid-19; CRP elevated; Syncope and collapse; Orthostatic hypotension; H/O mitral valve replacement with mechanical valve; Delirium; NSTEMI (non-ST elevated myocardial infarction) ([facility]); S/P MVR (mitral valve repair); Aortic valve regurgitation; Permanent atrial fibrillation ([facility]); Multifocal pneumonia; Aspiration pneumonia of right middle lobe ([facility]); Bandemia; Acute respiratory failure with hypoxia ([facility]); Metabolic encephalopathy; Supratherapeutic INR; and Acute metabolic encephalopathy on their problem list.    Recent Chest Xray/CT of Chest: FINDINGS: 23  There are scattered infiltrates.  There is no pneumothorax.  The mediastinal  structures are stable.  The upper abdomen unremarkable.  The extrathoracic  soft tissues are unremarkable.  There is a gastric tube with the tip in the  proximal aspect of the stomach.       Date of Eval: 2023  Evaluating Therapist: VALERIA Nicolas    Current Diet level:  Current Diet : NPO  Current Liquid Diet : NPO    Primary Complaint    History was obtained from child and chart review.       Caio Arce is a 83 y.o. male with history of BPH, hypertension, 
SLP ALL NOTES  Speech Language Pathology  [facility]    Dysphagia Treatment Note    Date: 12/28/2023  Patient’s Name: Caio Arce  MRN: [id number]  Diagnosis: dysphagia  Patient Active Problem List   Diagnosis Code    Arthritis M19.90    COPD (chronic obstructive pulmonary disease) (HCC) J44.9    Reflux [id number]    Hypertension I10    Hyperlipidemia E78.5    Hyperglycemia R73.9    Presence of cardiac pacemaker Z95.0    Groin mass R19.09    Hyperkalemia E87.5    Varicose veins I83.90    Orthostasis I95.1    Hypotension due to drugs I95.2    Vertigo R42    Anticoagulant long-term use Z79.01    History of mitral valve replacement Z95.2    Pacemaker failure, initial encounter T82.118A    Complete heart block (HCC) I44.2    Nicotine dependence, uncomplicated F17.200    Sick sinus syndrome ([facility]) I49.5    Paroxysmal atrial fibrillation ([facility]) I48.0    Hypokalemia E87.6    Hypocalcemia E83.51    Pacemaker at end of battery life Z45.010    COVID-19 U07.1    Pneumonia due to COVID-19 virus U07.1, J12.82    Elevated troponin R79.89    Unvaccinated for covid-19 Z28.310    CRP elevated R79.82    Syncope and collapse R55    Orthostatic hypotension I95.1    H/O mitral valve replacement with mechanical valve Z95.2    Delirium R41.0    NSTEMI (non-ST elevated myocardial infarction) ([facility]) I21.4    S/P MVR (mitral valve repair) Z98.890    Aortic valve regurgitation I35.1    Permanent atrial fibrillation ([facility]) I48.21    Multifocal pneumonia J18.9    Aspiration pneumonia of right middle lobe ([facility]) J69.0    Bandemia D72.825    Acute respiratory failure with hypoxia ([facility]) J96.01    Metabolic encephalopathy G93.41    Supratherapeutic INR R79.1    Acute metabolic encephalopathy G93.41    Elevated C-reactive protein (CRP) R79.82    Acute pain of right knee M25.561    Oropharyngeal dysphagia R13.12       Pain: pt denies    Dysphagia Treatment  Treatment time: 3657-8657    Subjective: [] Alert [x] Cooperative     [] 
SPEECH LANGUAGE PATHOLOGY  Speech Language Pathology  [facility]    Date: 12/19/2023  Patient Name: Caio Arce  YOB: 1940   AGE: 83 y.o.  MRN: [id number]        Patient Not Available for Speech Therapy     Due to:  [] Testing  [] Hemodialysis  [] Cancelled by RN  [] Surgery   [] Intubation/Sedation/Pain Medication  [] Medical instability  [] Refusal  [x] Other: ST spoke with RN Jaelyn re: video swallow study recommendation from bedside swallow eval completed yesterday 12/18. She states she will reach ot to critical care to request order.     Completed by: Corey George M.S., CCC-SLP     
[facility]  Office: [phone]  Mario Pacheco DO, Titi Soto DO, Aneesh Segundo DO, Ian Parker DO, Willem Rodriguez MD, Mary Troy MD, Ji Stein MD, Adali Gary MD,  Hudson Will MD, India Caruso MD, Ashley Agustin MD,  Hermes Connor DO, Elli Rodriguez MD, Jluis Hernandez MD, Ryan Pacheco DO, Kenzie Ruffin MD,  Sheldon Wall DO, Lluvia Holguin MD, Ella Marshall MD, Samantha Gonzalez MD, Magi Keith MD,  Jj Otero MD, Emili Montenegro MD, Sissy Farmer MD, Neno Weinberg MD, Red Thomason MD, Merna Hood MD, Min Cope DO, Alfa Guzman DO, Elise Kohler MD,  Roshan Hinds MD, Shirley Waterhouse, CNP,  Coty Goodman CNP, Carlito Tellez, CNP,  Luz Maria Zhou, INGRIS, Flavia Mckinney, CNP, Oneyda Rosen, CNP, Antonia Dempsey CNP, Loren Astorga, CNP, Anaya Dawkins, CNP, Mita Pink PAPhoebeC, JAGJIT GalvezC, Lyndsay Morrissey, CNP, Wandy Chu, CNS, Wilma Kamara, CNP, Salma Champion, CNP, Tracy Schwab, CNP         [facility]   IN-PATIENT SERVICE   [facility]    Progress Note    1/7/2024    2:08 PM    Name:   Caio Arce  MRN:     [id number]     Acct:      [id number]   Room:   2010/2010-01  IP Day:  28  Admit Date:  12/10/2023  6:59 AM    PCP:   Jaqui Watkins PA-C  Code Status:  DNR-CCA    Subjective:     C/C:   Chief Complaint   Patient presents with    Diarrhea     X3 weeks, only during the day, not at night, denies emesis     Interval History Status: not changed.     Patient examined at bedside  No acute events overnight  Patient is eating much better.    Patient is awaiting placement  Brief History:     83-year-old male past medical history of BPH, hypertension, hyperlipidemia, systolic heart failure EF 45 to 50%, mitral valve replacement in 2014 on Coumadin, history of permanent pacemaker originally presented diarrhea productive cough, sputum found to be COVID positive. Patient had respiratory distress while 
[facility]  PROGRESS NOTE    Shift date: 12/25/2023   Shift day: Sunday   Shift # 1    Room # 3007/3007-01   Name: Caio Arce                Jewish: Oriental orthodox   Place of Confucianist: None    Referral: Request from daughter for a  or     Admit Date & Time: 12/10/2023  6:59 AM    Assessment:  Caio Arce is a 83 y.o. male. Upon entering the room writer observes daughter at bedside, tearful. Dtr stated that she is waiting for her brother and pt's wife to arrive. She was unsure about when the extubation would happen. Dtr said she thinks they would want prayer.       Intervention:  Writer introduced self and title as  Writer offered space for dtr  to express feelings, needs, and concerns and provided a ministry presence.  encouraged her to talk to other family about when they would like prayer and to let the nurse know to notify spiritual health.     Outcome:  Dtr appeared to be comforted by 's presence.     Plan:  Chaplains will remain available to offer spiritual and emotional support as needed.      Electronically signed by Chaplain Robles, on 12/25/2023 at 9:47 AM.  [facility]  [facility]  [phone]     
Speech Language Pathology  [facility]    Dysphagia Treatment Note    Date: 1/3/2024  Patient’s Name: Caio Arce  MRN: [id number]  Diagnosis:   Patient Active Problem List   Diagnosis Code    Arthritis M19.90    COPD (chronic obstructive pulmonary disease) ([facility]) J44.9    Reflux [id number]    Hypertension I10    Hyperlipidemia E78.5    Hyperglycemia R73.9    Presence of cardiac pacemaker Z95.0    Groin mass R19.09    Hyperkalemia E87.5    Varicose veins I83.90    Orthostasis I95.1    Hypotension due to drugs I95.2    Vertigo R42    Anticoagulant long-term use Z79.01    History of mitral valve replacement Z95.2    Pacemaker failure, initial encounter T82.118A    Complete heart block ([facility]) I44.2    Nicotine dependence, uncomplicated F17.200    Sick sinus syndrome ([facility]) I49.5    Paroxysmal atrial fibrillation ([facility]) I48.0    Hypokalemia E87.6    Hypocalcemia E83.51    Pacemaker at end of battery life Z45.010    COVID-19 U07.1    Pneumonia due to COVID-19 virus U07.1, J12.82    Elevated troponin R79.89    Unvaccinated for covid-19 Z28.310    CRP elevated R79.82    Syncope and collapse R55    Orthostatic hypotension I95.1    H/O mitral valve replacement with mechanical valve Z95.2    Delirium R41.0    NSTEMI (non-ST elevated myocardial infarction) ([facility]) I21.4    S/P MVR (mitral valve repair) Z98.890    Aortic valve regurgitation I35.1    Permanent atrial fibrillation ([facility]) I48.21    Multifocal pneumonia J18.9    Aspiration pneumonia of right middle lobe ([facility]) J69.0    Bandemia D72.825    Acute respiratory failure with hypoxia ([facility]) J96.01    Metabolic encephalopathy G93.41    Supratherapeutic INR R79.1    Acute metabolic encephalopathy G93.41    Elevated C-reactive protein (CRP) R79.82    Acute pain of right knee M25.561    Oropharyngeal dysphagia R13.12       Pain: 0/10    Dysphagia Treatment  Treatment time:5147-8942    Subjective: [x] Alert [x] Cooperative     [] Confused     [] Agitated    [] 
[facility]  Office: [phone]  Mario Pacheco DO, Titi Soto DO, Aneesh Segundo DO, Ian Parker DO, Willem Rodriguez MD, Mary Troy MD, Ji Stein MD, Adali Gary MD,  Hudson Will MD, India Caruso MD, Ashley Agustin MD,  Hermes Connor DO, Elli Rodriguez MD, Jluis Hernandez MD, Ryan Pacheco DO, Kenzie Ruffin MD,  Sheldon Wall DO, Lluvia Holguin MD, Ella Marshall MD, Samantha Gonzalez MD, Magi Keith MD,  Jj Otero MD, Emili Montenegro MD, Sissy Farmer MD, Neno Weinberg MD, Red Thomason MD, Merna Hood MD, Min Cope DO, Alfa Guzman DO, Elise Kohlre MD,  Roshan Hinds MD, Shirley Waterhouse, CNP,  Coty Goodman, CNP, Carlito Tellez, CNP,  Luz Maria Zhou, DNP, Flavia Mckinney, CNP, Oneyda Rosen, CNP, Antonia Dempsey CNP, Loren Astorga, CNP, Anaya Dawkins, CNP, Mita Pink PA-C, Misti Carrizales PA-C, Lyndsay Morrissey, CNP, Wandy Chu, CNS, Wilma Kamara, CNP, Salma Champion, CNP, Tracy Schwab, CNP         [facility]   IN-PATIENT SERVICE   [facility]    Second Visit Note  For more detailed information please refer to the progress note of the day      12/22/2023    12:56 PM    Name:   Caio Arce  MRN:     [id number]     Acct:      [id number]   Room:   0108/0108-01   Day:  12  Admit Date:  12/10/2023  6:59 AM    PCP:   Jaqui Watkins PA-C  Code Status:  Full Code    Given patient's failed swallow study we will hold off initiating Coumadin pending surgery evaluation.  Lovenox can be held preoperatively as needed.    Updated plan :     As above        Ryan Pacheco,   12/22/2023  12:56 PM   
[facility]  PROGRESS NOTE    Shift date: 12.25.2023  Shift day: Monday   Shift # 2    Room # 3007/3007-01   Name: Caio Arce                Adventism: unknown   Place of Jewish: unknown    Referral: Routine Visit    Admit Date & Time: 12/10/2023  6:59 AM    Assessment:  Caio Arce is a 83 y.o. male in the hospital and recently extubated. Upon entering the room writer observes the patient remains calm and coping.  Patient engaged in some conversation.  Family bedside appear to be calm, coping, and remaining hopeful and optimistic.       Intervention:  Writer introduced self and title as  Writer offered space for the patient and family  to express feelings, needs, and concerns and provided a ministry presence.     Outcome:  Patient and family remain calm and coping.      Plan:  Chaplains will remain available to offer spiritual and emotional support as needed.      Electronically signed by Chaplain Alona, on 12/26/2023 at 12:45 AM.  [facility]  [phone]       12/25/23 1640   Encounter Summary   Encounter Overview/Reason  Follow-up   Service Provided For: Patient and family together   Referral/Consult From: [facility]   Support System Family members   Last Encounter  12/25/23   Complexity of Encounter Moderate   Begin Time 1640   End Time  1700   Total Time Calculated 20 min   Crisis   Type Follow up   Assessment/Intervention/Outcome   Assessment Calm;Coping   Intervention Active listening;Discussed illness injury and it’s impact;Explored/Affirmed feelings, thoughts, concerns   Outcome Engaged in conversation;Expressed feelings, needs, and concerns;Encouraged;Coping;Optimistic     Electronically signed by Jimenez Andres on 12/26/2023 at 12:45 AM    
[facility]  Office: [phone]  Mario Pacheco DO, Titi Soto DO, Aneesh Segundo DO, Ian Parker DO, Willem Rodriguez MD, Mary Troy MD, Ji Stein MD, Adali Gary MD,  Hudson Will MD, India Caruso MD, Ashley Agustin MD,  Hermes Connor DO, Elli Rodriguez MD, Jluis Hernandez MD, Ryan Pacheco DO, Kenzie Ruffin MD,  Sheldon Wall DO, Lluvia Holguin MD, Ella Marshall MD, Samantha Gonzalez MD, Magi Keith MD,  Jj Otero MD, Emili Montenegro MD, Sissy Farmer MD, Neno Weinberg MD, Red Thomason MD, Merna Hood MD, Min Cope DO, Alfa Guzman DO, Elise Kohler MD,  Roshan Hinds MD, Shirley Waterhouse, CNP,  Coty Goodman, CNP, Carlito Tellez, CNP,  Luz Maria Zhou, INGRIS, Flavia Mckinney, CNP, Oneyda Rosen, CNP, Antonia Dempsey CNP, Loren Astorga, CNP, Anaya Dawkins, CNP, Mita Pink, PAPhoebeC, Misti Carrizales PAPhoebeC, Lyndsay Morrissey, CNP, Wandy Chu, CNS, Wilma Kamara, CNP, Salma Champion, CNP, Tracy Schwab, CNP         [facility]   IN-PATIENT SERVICE   [facility]    Progress Note    12/31/2023    12:14 PM    Name:   Caio Arce  MRN:     [id number]     Acct:      [id number]   Room:   2010/2010-01   Day:  21  Admit Date:  12/10/2023  6:59 AM    PCP:   Jaqui Watkins PA-C  Code Status:  DNR-CCA    Subjective:     C/C:   Chief Complaint   Patient presents with    Diarrhea     X3 weeks, only during the day, not at night, denies emesis     Interval History Status: not changed.     Patient seen and examined at bedside. In good spirits. Daughter has been visiting today.    Brief History:     83-year-old male past medical history of BPH, hypertension, hyperlipidemia, systolic heart failure EF 45 to 50%, mitral valve replacement in 2014 on Coumadin, history of permanent pacemaker originally presented diarrhea productive cough, sputum found to be COVID positive. Patient had respiratory distress while being altered and was 
23  --  20   BUN 74* 47*  --  42*   CREATININE 1.2 1.0 1.0 0.9   GLUCOSE 168* 195*  --  193*         Lab Results   Component Value Date    CHOL 145 10/29/2018    LDLCHOLESTEROL 71 10/29/2018    HDL 47 10/29/2018    TRIG 137 10/29/2018    ALT 46 (H) 12/14/2023    AST 66 (H) 12/14/2023    TSH 0.08 (L) 12/16/2023    INR 1.6 12/16/2023    GLUF 97 10/29/2018    LABA1C 5.2 10/29/2018    UDVWEEII01 745 12/16/2023    MG 2.5 12/12/2023       No results found for: \"PHENYTOIN\", \"PHENOBARB\", \"VALPROATE\", \"CBMZ\"      Imaging/Diagnostics:      Results for orders placed during the hospital encounter of 12/10/23    CT HEAD WO CONTRAST    Narrative  EXAMINATION:  CT OF THE HEAD WITHOUT CONTRAST  12/16/2023 4:16 am    TECHNIQUE:  CT of the head was performed without the administration of intravenous  contrast. Automated exposure control, iterative reconstruction, and/or weight  based adjustment of the mA/kV was utilized to reduce the radiation dose to as  low as reasonably achievable.    COMPARISON:  CT brain performed 12/15/2023.    HISTORY:  ORDERING SYSTEM PROVIDED HISTORY: Altered mentation; suspicion of hemorrhage  TECHNOLOGIST PROVIDED HISTORY:    Altered mentation; suspicion of hemorrhage    FINDINGS:  BRAIN/VENTRICLES: There is no acute intracranial hemorrhage, mass effect, or  midline shift.  There is satisfactory overall gray-white matter  differentiation.  There is cerebral atrophy.  The ventricular structures are  symmetric and unremarkable.  The infratentorial structures are unremarkable.    ORBITS: The visualized portion of the orbits demonstrate no acute abnormality.    SINUSES: The visualized paranasal sinuses and mastoid air cells demonstrate  no acute abnormality.    SOFT TISSUES/SKULL:  No acute abnormality of the visualized skull or soft  tissues.    Impression  No acute intracranial abnormality.      I personally reviewed all of the above medications, clinical laboratory, imaging and other diagnostic tests. 
Yes                           SECRETIONS                 Amount:  []   Small [x]   Moderate []   Large []   None        Color: [x]   White []   Colored []   Bloody                         SEDATION:                 RAAS Score: [x]   +1 to -1 []   -2 []   -3 []   -4        Sedation Agent: []   Propofol gtt []   Versed gtt  []   Ativan gtt  [x]   Precedex        Paralytic Agent: []   Precedex []   Norcuron []   Nimbex []                         VASOPRESSORS:  []   No  [x]   Yes            Vasopressor Agent [x]   Levophed []   Dopamine []   Vasopressin []   Dobutamine  []   Phenylephrine  []   Epinephrine     OBJECTIVE:     VITAL SIGNS:  Patient Vitals for the past 8 hrs:   BP Temp Temp src Pulse Resp SpO2 Weight   23 1100 136/79 97 °F (36.1 °C) -- 84 26 95 % --   23 1000 118/78 (!) 96.4 °F (35.8 °C) -- 78 25 95 % --   23 0900 (!) 156/92 97.7 °F (36.5 °C) -- 87 26 100 % --   23 0853 -- -- -- 91 24 93 % --   23 0815 (!) 152/99 -- -- 72 -- -- --   23 0800 (!) 152/99 98.4 °F (36.9 °C) Bladder 85 27 95 % --   23 0700 (!) 141/76 98.6 °F (37 °C) -- 92 23 94 % --   23 0600 (!) 150/93 98.8 °F (37.1 °C) -- 83 22 94 % 71.5 kg (157 lb 10.1 oz)   23 0500 (!) 151/89 99.1 °F (37.3 °C) -- 85 29 95 % --       Last Body weight:   Wt Readings from Last 3 Encounters:   23 71.5 kg (157 lb 10.1 oz)   23 74.4 kg (164 lb)   23 74.4 kg (164 lb)       Body Mass Index : Body mass index is 22.62 kg/m².      Tmax over 24 hours: Temp (24hrs), Av °F (37.2 °C), Min:96.4 °F (35.8 °C), Max:99.7 °F (37.6 °C)      Ins/Outs:   In: 4088.7 [I.V.:2543.7; NG/GT:1545]  Out: 1394 [Urine:1394]    PHYSICAL EXAM:  Constitutional: Cachectic appearing male, intubated and sedated  Neck: Trachea midline, no JVD  Respiratory: Equal chest rise and fall, moderate wheezes noted  Cardiovascular: Regular rate and rhythm, paced rhythm noted on monitor  Abdomen: PEG tube in place, there is no 
management training, Equipment evaluation, education, & procurement, Positioning, Endurance training, ROM, Cognitive reorientation     Restrictions  Restrictions/Precautions  Restrictions/Precautions: Up as Tolerated, Fall Risk, Contact Precautions, NPO (Covid +)  Required Braces or Orthoses?: No    Subjective   General  Patient assessed for rehabilitation services?: Yes  Family / Caregiver Present: Yes (DTR, present and supportive.)  General Comment  Comments: No c/o pain. RN ok for therapy session.         Objective           Safety Devices  Type of Devices: Nurse notified;Left in bed;Call light within reach;Patient at risk for falls;Bed alarm in place  Restraints  Restraints Initially in Place: No  Bed Mobility Training  Bed Mobility Training: Yes  Overall Level of Assistance: Minimum assistance;Assist X2;Additional time  Interventions: Verbal cues;Tactile cues;Safety awareness training;Weight shifting training/pressure relief (Max cues for tech/hand placement.)  Supine to Sit: Assist X2;Minimum assistance;Additional time  Sit to Supine: Minimum assistance;Assist X2;Additional time  Scooting: Minimum assistance;Assist X2  Balance  Sitting:  (Sat EOB ~35 minutes while engaged in self care activity/static/dynamic activity. Min A/CGA overall, B UE tremors noted, mild multidirectional LOB.)  Standing:  (Static/dynamic, RW, forward flexed posture, neck/B hips. Stood 4 times, 1-2 minutes each. Max A x 2 + max cues to keep upright posture.)  Transfer Training  Transfer Training: Yes  Overall Level of Assistance: Minimum assistance;Assist X2;Additional time (EOB<->stand with RW, 4 times. Min A x2, flexed at B hips and neck.)  Interventions: Weight shifting training/pressure relief;Manual cues;Safety awareness training;Tactile cues;Verbal cues (Cues for B hand placement, tech and pursed lip breathing.)  Sit to Stand: Assist X1;Minimum assistance  Stand to Sit: Minimum assistance;Assist X1  Gait  Overall Level of 
productive cough, sputum found to be COVID positive. Patient had respiratory distress while being altered and was transferred to ICU for concern of further deterioration. Patient also had concern for NSTEMI, started on heparin, patient was intubated on 12/23/23 and extubated 12/25/23. Patient transferred to step down. Patient weaning off high flow currently on bipap to maintain saturations. Code status clarified as DNR-CCA. Completed zosyn, weaned down to room air. On tube feeds     Medications:     Allergies:  No Known Allergies    Current Meds:   Scheduled Meds:    warfarin  2.5 mg Oral Once    miconazole   Topical BID    fluticasone  2 spray Each Nostril Daily    warfarin placeholder: dosing by provider   Other RX Placeholder    lansoprazole  30 mg Oral QAM AC    sodium chloride flush  5-40 mL IntraVENous 2 times per day    insulin lispro  0-4 Units SubCUTAneous Q6H    metoprolol tartrate  12.5 mg Oral BID    atorvastatin  20 mg Oral Daily    aspirin  81 mg Oral Daily    sodium chloride flush  5-40 mL IntraVENous 2 times per day     Continuous Infusions:    sodium chloride      dextrose      sodium chloride Stopped (12/21/23 2112)     PRN Meds: sodium chloride, ipratropium 0.5 mg-albuterol 2.5 mg, morphine, glycopyrrolate, oxyCODONE, sodium chloride flush, sodium chloride, glucose, dextrose bolus **OR** dextrose bolus, glucagon (rDNA), dextrose, albuterol, metoprolol, sodium chloride flush, sodium chloride, potassium chloride **OR** potassium alternative oral replacement **OR** potassium chloride, magnesium sulfate, polyethylene glycol, acetaminophen **OR** acetaminophen, sodium chloride    Data:     Past Medical History:   has a past medical history of Anxiety, Arthritis, BPH (benign prostatic hyperplasia), Constipation, COPD (chronic obstructive pulmonary disease) ([facility]), Current use of aspirin, Hyperglycemia, Hyperlipidemia, Hypertension, MVP (mitral valve prolapse), Osteoarthritis, Reflux, and Wears 
reorientation, Safety education & training, Therapeutic activities  Safety Devices  Type of Devices: Nurse notified, Left in bed, Call light within reach, Patient at risk for falls, Bed alarm in place, Heels elevated for pressure relief  Restraints  Restraints Initially in Place: No     Restrictions  Restrictions/Precautions  Restrictions/Precautions: Up as Tolerated, Fall Risk, Contact Precautions, NPO (Covid +)  Required Braces or Orthoses?: No     Subjective   General  Chart Reviewed: No  Patient assessed for rehabilitation services?: Yes  Response To Previous Treatment: Patient with no complaints from previous session.  Family / Caregiver Present: No  Follows Commands: Within Functional Limits  General Comment  Comments: Pt retired to bed supine with call light, positioned for comfort, and RN notified.  Subjective  Subjective: Pt and RN agreeable to tx session, Pt supine in bed reporting buttock pain no numerical value given, RN notified cream applied, pleasant and cooperative t/o.    Vision/Hearing       Cognition   Orientation  Orientation Level: Disoriented to situation;Oriented to person;Oriented to place;Disoriented to time  Cognition  Overall Cognitive Status: Exceptions  Arousal/Alertness: Delayed responses to stimuli  Following Commands: Follows one step commands with increased time;Follows one step commands with repetition  Attention Span: Attends with cues to redirect  Memory: Decreased recall of recent events  Safety Judgement: Decreased awareness of need for assistance;Decreased awareness of need for safety  Problem Solving: Assistance required to identify errors made;Decreased awareness of errors;Assistance required to correct errors made;Assistance required to generate solutions  Insights: Decreased awareness of deficits  Initiation: Requires cues for all  Sequencing: Requires cues for all     Objective   Bed mobility  Supine to Sit: 2 Person assistance;Maximum assistance  Sit to Supine: 2 Person 
tube open to gravity overnight, no evidence of bleeding.   Ok to use gastrostomy tube for feedings  Abdominal binder to cover gastrostomy tube during activities or if patient attempting to grab/pull  Ok to resume lovenox tonight at 9pm  Staples out in 2 weeks, sutures to skin bumper will dissolve in 4-6 weeks  Goal extubation per ICU team today    General surgery to sign off, please contact us with questions or concerns.      HISTORY:   History and Physical   patient seen and examined this morning. YANICK, VSS, tolerating ventilation without difficulty and on minimal settings, goal extubate today.    Past Medical History   has a past medical history of Anxiety, Arthritis, BPH (benign prostatic hyperplasia), Constipation, COPD (chronic obstructive pulmonary disease) (HCC), Current use of aspirin, Hyperglycemia, Hyperlipidemia, Hypertension, MVP (mitral valve prolapse), Osteoarthritis, Reflux, and Wears glasses.  Past Surgical History   has a past surgical history that includes pacemaker placement (10/14/2014); Inguinal hernia repair (Bilateral, 94, 96); Cardiac valve replacement (10/14/2014); and other surgical history (Left, 01/16/2014).  Medications  Prior to Admission medications    Medication Sig Start Date End Date Taking? Authorizing Provider   nicotine (NICODERM CQ) 21 MG/24HR Place 1 patch onto the skin daily  Patient not taking: Reported on 7/20/2023 6/11/23   Hermes Connor DO   allopurinol (ZYLOPRIM) 100 MG tablet Take 1 tablet by mouth daily    ProviderRubén MD   meclizine (ANTIVERT) 12.5 MG tablet Take 1 tablet by mouth 3 times daily as needed    ProviderRubén MD   colchicine (COLCRYS) 0.6 MG tablet Take 1 tablet by mouth daily as needed for Pain  Patient not taking: Reported on 7/20/2023    ProviderRubén MD   warfarin (COUMADIN) 2 MG tablet 1.5 tablets Patient instructed to continue warfarin 3 mg every day. Check INR in 6 week(s). On 4/2023 by [facility]    
(with RW)  Gait  Overall Level of Assistance:  (Unsafe to assess this date.)        ADL  Grooming: Moderate assistance;Increased time to complete;Verbal cueing;Setup  Grooming Skilled Clinical Factors: Sitting EOB, CGA-min A to mallika mattson. Pt. needing mod A/ hand over hand to brush hair, hair/wash face, increased time and effort.  UE Dressing: Moderate assistance;Increased time to complete;Verbal cueing;Setup  UE Dressing Skilled Clinical Factors: Pt engaging in [facility] gown with vc's and mod A.     Activity Tolerance  Activity Tolerance: Patient limited by fatigue;Patient limited by endurance           Cognition  Overall Cognitive Status: Exceptions  Arousal/Alertness: Delayed responses to stimuli  Following Commands: Follows one step commands with increased time;Follows one step commands with repetition  Attention Span: Attends with cues to redirect  Memory: Decreased recall of recent events  Safety Judgement: Decreased awareness of need for assistance;Decreased awareness of need for safety  Problem Solving: Assistance required to identify errors made;Decreased awareness of errors;Assistance required to correct errors made;Assistance required to generate solutions  Insights: Decreased awareness of deficits  Initiation: Requires cues for all  Sequencing: Requires cues for all  Orientation  Orientation Level: Disoriented to situation;Oriented to person;Oriented to place;Disoriented to time                  Education Given To: Patient  Education Provided: Role of Therapy;Plan of Care;Orientation;ADL Adaptive Strategies;Transfer Training;Fall Prevention Strategies;Equipment  Education Method: Verbal;Demonstration  Barriers to Learning: Cognition  Education Outcome: Continued education needed;Verbalized understanding             AM-PAC - ADL  AM-PAC Daily Activity - Inpatient   How much help is needed for putting on and taking off regular lower body clothing?: A Lot  How much help is needed for bathing 
C-Diff test and GI panel were ordered for the diarrhea.    ID was consulted for the positive Covid test.     CT abd/pelvis 12/10/23:  No acute abnormality identified in the abdomen and pelvis.  Right middle lobe and lingular pneumonia.  Sigmoid diverticulosis without acute diverticulitis.  Cholelithiasis without additional evidence for acute cholecystitis.  Interval resolution of right groin mass from remote examination, which may  have represented a enlarged lymph node.     CXR 12/10/23:  1. Bibasilar airspace disease, atelectasis and/or infiltrate.  Mild pulmonary  vascular congestion.  2. Left-sided cardiac pacemaker device in place.      CURRENT EVALUATION : 12/13/2023  BP (!) 166/137   Pulse (!) 102   Temp 98.2 °F (36.8 °C) (Oral)   Resp 22   Ht 1.753 m (5' 9\")   Wt 72.6 kg (160 lb)   SpO2 97%   BMI 23.63 kg/m²     Afebrile  VS stable, tachycardic  Was hallucinating and agitated with tremulousness this morning per RN    Medications reviewed:  On Remdesivir for Covid  Azithromycin D/C    Patient exhibiting respiratory distress. yes  Respiratory secretions: No    Patient receiving supplemental oxygen. 2 L /min   RR: 16  02 sat: 93      NEWS Score: 0-4 Low risk group; 5-6: Medium risk group; 7 or above: High risk group  Parameters 3 2 1 0 1 2 3   Age    < 65   ? 65   RR ? 8  9-11 12-20  21-24 ? 25   O2 Sats ? 91 92-93 94-95 ? 96      Suppl O2  Yes  No      SBP ? 90  101-110 111-219   ? 220   HR ? 40  41-50 51-90  111-130 ? 131   Consciousness    Alert   Drowsiness, lethargy, or confusion   Temperature ? 35.0 C (95.0 F)  35.1-36.0 C 95.1-96.9 F 36.1-38.0 C 97.0-100.4 F 38.1-39.0 C 100.5-102.3 F ? 39.1 C ? 102.4 F      NEWS Score:  12/10/23: 8 High risk of requiring ICU care     Overall Daily Picture:   Worsening    Presence of secondary bacterial Infection:  No   Additional antibiotics: No    Labs, X rays reviewed: 
Physician Statement  I have discussed the care of the patient, including pertinent history and exam findings, with the resident. I have seen and examined the patient and the key elements of all parts of the encounter have been performed by me. I agree with the assessment, plan and orders as documented by the resident.  Elevated Trop , hard to rule out Type 1 , but possible Type 2   May needs Ischemia work up once improves from current conditions   High INR   Please ask Pharmacy to look into antibiotic effecting INR   Keep holding coumadin   Once down 1.7 then heparin drip   MVR , Normally functioning , Moderate AI will follow   Manuel Messina MD   
received the Covid vaccine, but has received the flu vaccine.     The patient was found to be afebrile with stable vital signs on room air.     Work-up in the ED revealed the patient to be Covid positive.   His lab-work was significant for DAMARIS with a creatinine of 1.4.    A CXR showed mild pulmonary vascular congestion as wall as bibasilar airspace disease.     A CT abd/pelvis revealed no acute abnormalities of the abdomen, although there was evidence for right middle lobe and lingular pneumonia.    He was given a dose of IV rocephin and was initiated on Azithromycin.     A C-Diff test and GI panel were ordered for the diarrhea.    ID was consulted for the positive Covid test.     CT abd/pelvis 12/10/23:  No acute abnormality identified in the abdomen and pelvis.  Right middle lobe and lingular pneumonia.  Sigmoid diverticulosis without acute diverticulitis.  Cholelithiasis without additional evidence for acute cholecystitis.  Interval resolution of right groin mass from remote examination, which may  have represented a enlarged lymph node.     CXR 12/10/23:  1. Bibasilar airspace disease, atelectasis and/or infiltrate.  Mild pulmonary  vascular congestion.  2. Left-sided cardiac pacemaker device in place.      CURRENT EVALUATION : 12/20/2023  /76   Pulse 60   Temp 97.8 °F (36.6 °C) (Axillary)   Resp 12   Ht 1.753 m (5' 9.02\")   Wt 71.3 kg (157 lb 3 oz)   SpO2 95%   BMI 23.20 kg/m²     Patient evaluated and examined in the ICU.     Afebrile  VS stable  V-paced    The patient has been agitated and pulled out his NG overnight.  He was initiated on a Precedex gtt.   He is on a cool aerosol mask with 10-->8 L and 40-->35% FiO2.    His knees were noted to be swollen and warm compared to normal.  Ortho was consulted and performed left knee arthrocentesis.  Aspiration of the right knee was performed and sent, but according to Ortho, there was insufficient fluid for a good sample.    Synovial fluid was not 
12/15/2023    Aortic valve regurgitation 12/15/2023    Permanent atrial fibrillation (HCC) 12/15/2023    Acute metabolic encephalopathy 12/18/2023    Elevated C-reactive protein (CRP) 12/18/2023    Acute respiratory failure with hypoxia (HCC) 12/17/2023    Metabolic encephalopathy 12/17/2023    Supratherapeutic INR 12/17/2023    Multifocal pneumonia 12/16/2023    Aspiration pneumonia of right middle lobe (HCC) 12/16/2023    Bandemia 12/16/2023    Delirium 12/14/2023    H/O mitral valve replacement with mechanical valve 12/12/2023    COVID-19 12/10/2023    Pneumonia due to COVID-19 virus 12/10/2023    Elevated troponin 12/10/2023    Unvaccinated for covid-19 12/10/2023    CRP elevated 12/10/2023    Syncope and collapse 12/10/2023    Orthostatic hypotension 12/10/2023    Pacemaker failure, initial encounter 06/08/2023    Hypokalemia 06/08/2023    Hypocalcemia 06/08/2023    Pacemaker at end of battery life 06/08/2023    Complete heart block (HCC) 03/13/2023    Anticoagulant long-term use 08/24/2016    History of mitral valve replacement 08/24/2016    Paroxysmal atrial fibrillation (HCC) 08/24/2016    Orthostasis 04/14/2016    Hypotension due to drugs 04/14/2016    Vertigo 04/14/2016    Varicose veins 06/11/2015    Hyperkalemia 06/10/2015    Nicotine dependence, uncomplicated 03/19/2015    Groin mass 12/17/2014    Presence of cardiac pacemaker 11/05/2014    Arthritis     COPD (chronic obstructive pulmonary disease) (HCC)     Reflux     Hypertension     Hyperlipidemia     Hyperglycemia     Sick 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 Heparin Subcut  [x] EPC Cuffs    NUTRITION:  [x] NPO [] Tube Feeding (Specify: ) [] TPN  [] PO    HOME MEDS RECONCILED: [] No  [x] Yes    CONSULTATION NEEDED:  [x] No  [] Yes    FAMILY UPDATED:    [] No  [x] 
AM    POCPCO2 34.0 12/25/2023 04:08 AM    POCPO2 90.7 12/25/2023 04:08 AM    POCHCO3 23.7 12/25/2023 04:08 AM    NBEA 0.3 12/24/2023 04:16 AM    PBEA 0.1 12/25/2023 04:08 AM    SLLE6SMZ 97.5 12/25/2023 04:08 AM    FIO2 30.0 12/25/2023 04:08 AM     Lab Results   Component Value Date/Time    SPECIAL RIGHT KNEE 12/18/2023 02:20 PM     Lab Results   Component Value Date/Time    CULTURE NORMAL RESPIRATORY WHIT MODERATE GROWTH 12/23/2023 07:43 PM       Radiology:  XR CHEST PORTABLE    Result Date: 12/27/2023  1. Removal of the endotracheal tube with decreased lung volumes.  Left greater than right perihilar and basilar airspace opacities appear increased which is probably due to atelectasis related to small lung volumes superimposed upon pre-existing airspace opacities. 2. Mild cardiomegaly without evidence of CHF.     XR CHEST PORTABLE    Result Date: 12/23/2023  1. No significant change in aeration of the lungs from 12/18/2023. 2. Support lines and tubes as detailed above.     XR ABDOMEN (KUB) (SINGLE AP VIEW)    Result Date: 12/23/2023  Interval performance of surgery with midline skin clips.  A clip is present in the left upper quadrant.  No metallic structure suggestive of a retained instrument is noted.  Incidental gallstone. Critical results were called by Dr. Violet Horvath MD to Dr. Weaver on 12/23/2023 at 18:04.     FL MODIFIED BARIUM SWALLOW W VIDEO    Result Date: 12/22/2023  1. Gross penetration and aspiration with a reflexive cough with the pureed/pudding thick substance. 2. Exam was then discontinued in the interest of patient's safety due to high risk for aspiration. Please see separate speech pathology report for full discussion of findings and recommendations.       Physical Examination:        Physical Exam  Constitutional:       Appearance: He is ill-appearing.   HENT:      Head: Normocephalic.      Right Ear: External ear normal.      Left Ear: External ear normal.      Nose: Nose normal.      
Demonstration;Verbal  Barriers to Learning: Cognition  Education Outcome: Verbalized understanding;Continued education needed      Therapy Time   Individual Concurrent Group Co-treatment   Time In 1533         Time Out 1552         Minutes 19         Timed Code Treatment Minutes: 12 Minutes       Ander Jones PTA         
Fair;-  Comments: Assessed with RW and EOB, Pt requires CGA-Shaq t/o all standing. Pt maintains EOB with SBA.  Exercise Treatment: Seated LE exercise program: Long Arc Quads, hip abduction/adduction, heel/toe raises, and marches. Reps: x 10   Static Standing Balance Exercises: Static standing for hygiene 3 min.      OutComes Score    AM-PAC - Mobility    AM-PAC Basic Mobility - Inpatient   How much help is needed turning from your back to your side while in a flat bed without using bedrails?: A Little  How much help is needed moving from lying on your back to sitting on the side of a flat bed without using bedrails?: A Little  How much help is needed moving to and from a bed to a chair?: A Little  How much help is needed standing up from a chair using your arms?: A Lot  How much help is needed walking in hospital room?: A Little  How much help is needed climbing 3-5 steps with a railing?: Total  AM-PAC Inpatient Mobility Raw Score : 15  AM-PAC Inpatient T-Scale Score : 39.45  Mobility Inpatient CMS 0-100% Score: 57.7  Mobility Inpatient CMS G-Code Modifier : CK    Goals  Short Term Goals  Time Frame for Short Term Goals: 20 visits  Short Term Goal 1: transfers with CGA  Short Term Goal 2: amb 50 ft with a RW x CGA  Short Term Goal 3: ascend/descend 4 steps with CGA  Short Term Goal 4: 20 min strengthening exercise program x min assist     Education  Patient Education  Education Given To: Patient;Family  Education Provided: Role of Therapy;Plan of Care;Transfer Training;Home Exercise Program  Education Provided Comments: UE hand placement  Education Method: Demonstration;Verbal  Barriers to Learning: Cognition  Education Outcome: Verbalized understanding;Continued education needed    Therapy Time   Individual Concurrent Group Co-treatment   Time In 1346         Time Out 1430         Minutes 44         Timed Code Treatment Minutes: 38 Minutes       Edward Rinaldi PTA         
hours.  CARDIAC ENZYMES: No results for input(s): \"CKMB\", \"CKMBINDEX\", \"TROPONINI\" in the last 72 hours.    Invalid input(s): \"CKTOTAL;3\"  FASTING LIPID PANEL:  Lab Results   Component Value Date    CHOL 145 10/29/2018    HDL 47 10/29/2018    TRIG 137 10/29/2018     LIVER PROFILE:   Recent Labs     12/10/23  0723   AST 63*   ALT 37   BILITOT 0.7   ALKPHOS 63        MICROBIOLOGY:   Lab Results   Component Value Date/Time    CULTURE NO GROWTH 2 DAYS 12/10/2023 09:18 AM       Imaging:    US GALLBLADDER RUQ    Result Date: 12/10/2023  Cholelithiasis without additional evidence acute cholecystitis. No biliary dilatation. Nonvisualized pancreas.     US RENAL COMPLETE    Result Date: 12/10/2023  Mildly echogenic bilateral kidneys which may reflect medical renal disease. No hydronephrosis. Bilateral simple Bosniak 1 renal cysts. Mild prostatomegaly.     CT ABDOMEN PELVIS W IV CONTRAST Additional Contrast? None    Result Date: 12/10/2023  No acute abnormality identified in the abdomen and pelvis. Right middle lobe and lingular pneumonia. Sigmoid diverticulosis without acute diverticulitis. Cholelithiasis without additional evidence for acute cholecystitis. Interval resolution of right groin mass from remote examination, which may have represented a enlarged lymph node.     XR CHEST (2 VW)    Result Date: 12/10/2023  1. Bibasilar airspace disease, atelectasis and/or infiltrate.  Mild pulmonary vascular congestion. 2. Left-sided cardiac pacemaker device in place.       ASSESSMENT & PLAN     ASSESSMENT / PLAN:   This is a 83 y.o. male who presented with flulike symptoms and was admitted for pneumonia, COVID, troponin elevation, DAMARIS     Principal Problem:    COVID-19  Active Problems:    Hypertension    Hyperlipidemia    Pneumonia due to COVID-19 virus    Elevated troponin    Unvaccinated for covid-19    Elevated C-reactive protein (CRP)    Syncope and collapse    Orthostatic hypotension    H/O mitral valve replacement with 
Mild tricompartmental degenerative changes at the right knee.   3. Chondrocalcinosis.         XR CHEST PORTABLE   Final Result   Scattered infiltrates.      Gastric tube with the tip in the proximal aspect the stomach.         XR CHEST PORTABLE   Final Result   Bibasilar infiltrates.      Gastric tube in the proximal aspect of the stomach.         XR ABDOMEN FOR NG/OG/NE TUBE PLACEMENT   Final Result   Enteric tube tip projects at the gastric body with side hole projecting at   the GE junction.  Recommend advancement of the tube by at least 3 cm.         CT HEAD WO CONTRAST   Final Result   No acute intracranial abnormality.         CT HEAD WO CONTRAST   Final Result   Cerebral atrophy without acute intracranial abnormality.         XR CHEST PORTABLE   Final Result   Worsening left perihilar infiltrates.      Stable right infrahilar infiltrates in mid right lung pulmonary infiltrates.         US GALLBLADDER RUQ   Final Result   Cholelithiasis without additional evidence acute cholecystitis.      No biliary dilatation.      Nonvisualized pancreas.         US RENAL COMPLETE   Final Result   Mildly echogenic bilateral kidneys which may reflect medical renal disease.   No hydronephrosis.      Bilateral simple Bosniak 1 renal cysts.      Mild prostatomegaly.         CT ABDOMEN PELVIS W IV CONTRAST Additional Contrast? None   Final Result   No acute abnormality identified in the abdomen and pelvis.      Right middle lobe and lingular pneumonia.      Sigmoid diverticulosis without acute diverticulitis.      Cholelithiasis without additional evidence for acute cholecystitis.      Interval resolution of right groin mass from remote examination, which may   have represented a enlarged lymph node.         XR CHEST (2 VW)   Final Result   1. Bibasilar airspace disease, atelectasis and/or infiltrate.  Mild pulmonary   vascular congestion.   2. Left-sided cardiac pacemaker device in place.              Echocardiogram:   No 
is needed for putting on and taking off regular lower body clothing?: A Lot  How much help is needed for bathing (which includes washing, rinsing, drying)?: A Lot  How much help is needed for toileting (which includes using toilet, bedpan, or urinal)?: A Lot  How much help is needed for putting on and taking off regular upper body clothing?: A Lot  How much help is needed for taking care of personal grooming?: A Lot  How much help for eating meals?: A Little  AM-[facility] Inpatient Daily Activity Raw Score: 13  AM-PAC Inpatient ADL T-Scale Score : 32.03  ADL Inpatient CMS 0-100% Score: 63.03  ADL Inpatient CMS G-Code Modifier : CL    Goals  Short Term Goals  Time Frame for Short Term Goals: Patient will, by discharge  Short Term Goal 1: demo UB ADLs at SBA  Short Term Goal 2: demo LB/toileting tasks at Min A using DME PRN  Short Term Goal 3: demo functional transfers/mobility using LRAD at Min A to engage in ADLs  Short Term Goal 4: demo 15+ min of dynamic sitting balance at SBA to engage in ADLs  Short Term Goal 5: demo 7+ min of dynamic standing tolerance at Min A to engage in ADL tasks  Short Term Goal 6: follow 3+ step ADL task with 0 additional VCs to improve ADL performance     Therapy Time   Individual Concurrent Group Co-treatment   Time In 1026         Time Out 1050         Minutes 24         Timed Code Treatment Minutes: 10 Minutes   Co-Eval with PT   Ekta Duncan, OTR/L      
    Slow recovery , covid 19 pneumonia , delirium     [place] [address].   Phone [phone]   Fax: [phone]  Answering Service: [phone]            
Bibasilar airspace disease, atelectasis and/or infiltrate.  Mild pulmonary   vascular congestion.   2. Left-sided cardiac pacemaker device in place.         XR CHEST PORTABLE    (Results Pending)        Echocardiogram:   No results found for this or any previous visit.       ASSESSMENT AND PLAN     Assessment:    // Acute hypoxic respiratory failure  // COVID-19 infection  // chronic active tobacco smoker  // Aspiration pneumonia  // PEG tube dependent  // COPD, severity to be determined.     Plan:  -Continue current management.  Can use glycopyrrolate as sparingly for increased respiratory secretion.  -Received remdesivir for Covid infection  -Completed 7 days of IV cefoxitin for for aspiration pneumonia  -Getting tube feed via PEG tube   -Continue supplemental oxygen to maintain saturation above 90%  -Use BiPAP during nighttime and as needed during daytime naps.  -Wean down oxygen as tolerated.  -Continue bronchodilators as scheduled.  -Encourage deep breathing.  Use of incentive spirometry and Acapella.  -Strict aspiration precaution  -Continue rest of the treatment as per the primary team.    We will continue to follow.     Blanca Caruso MD  Internal Medicine Resident, PGY-3  [facility]; [place], [place]  12/29/2023, 10:28 AM      Please note that this chart was generated using voice recognition Dragon dictation software.  Although every effort was made to ensure the accuracy of this automated transcription, some errors in transcription may have occurred.          
device in place.              Echocardiogram:   No results found for this or any previous visit.       ASSESSMENT AND PLAN     Assessment:    // Acute hypoxic respiratory failure improved   // COVID-19 infection  // chronic active tobacco smoker  // Aspiration pneumonia  // PEG tube dependent  // COPD, severity to be determined.     Plan:  Sec management   Pt ot   Will sign off     Electronically signed by JALEN HYDE MD on 12/30/2023 at 5:40 PM       Please note that this chart was generated using voice recognition Dragon dictation software.  Although every effort was made to ensure the accuracy of this automated transcription, some errors in transcription may have occurred.          
recovers from acute illness  Essential hypertension  Continue lisinopril    Medical Decision Making: Aracely Pacheco DO  12/22/2023  12:14 PM   
   PHUR 5.5 12/10/2023 05:20 PM    WBCUA 0 TO 2 12/10/2023 05:20 PM    RBCUA 0 TO 2 12/10/2023 05:20 PM    BACTERIA None 12/10/2023 05:20 PM    SPECGRAV 1.032 12/10/2023 05:20 PM    LEUKOCYTESUR NEGATIVE 12/10/2023 05:20 PM    UROBILINOGEN Normal 12/10/2023 05:20 PM    BILIRUBINUR NEGATIVE 12/10/2023 05:20 PM    GLUCOSEU NEGATIVE 12/10/2023 05:20 PM    KETUA SMALL 12/10/2023 05:20 PM       HgBA1c:    Lab Results   Component Value Date/Time    LABA1C 5.2 10/29/2018 11:02 AM     TSH:    Lab Results   Component Value Date/Time    TSH 0.08 12/16/2023 03:14 PM     Lactic Acid: No results found for: \"LACTA\"   Troponin: Invalid input(s): \"TROPONIN\"        Radiology/Imaging:  XR CHEST PORTABLE    Result Date: 12/14/2023  Worsening left perihilar infiltrates. Stable right infrahilar infiltrates in mid right lung pulmonary infiltrates.     US GALLBLADDER RUQ    Result Date: 12/10/2023  Cholelithiasis without additional evidence acute cholecystitis. No biliary dilatation. Nonvisualized pancreas.     US RENAL COMPLETE    Result Date: 12/10/2023  Mildly echogenic bilateral kidneys which may reflect medical renal disease. No hydronephrosis. Bilateral simple Bosniak 1 renal cysts. Mild prostatomegaly.     CT ABDOMEN PELVIS W IV CONTRAST Additional Contrast? None    Result Date: 12/10/2023  No acute abnormality identified in the abdomen and pelvis. Right middle lobe and lingular pneumonia. Sigmoid diverticulosis without acute diverticulitis. Cholelithiasis without additional evidence for acute cholecystitis. Interval resolution of right groin mass from remote examination, which may have represented a enlarged lymph node.     XR CHEST (2 VW)    Result Date: 12/10/2023  1. Bibasilar airspace disease, atelectasis and/or infiltrate.  Mild pulmonary vascular congestion. 2. Left-sided cardiac pacemaker device in place.          MEDICATIONS:  Scheduled Meds:   ipratropium 0.5 mg-albuterol 2.5 mg  1 Dose Inhalation Q6H WA RT    
--  15.1*   HGB 14.1 14.4 14.3   HCT 42.6 45.2 43.9   PLT See Reflexed IPF Result  --  231   LYMPHOPCT 13*  --  14*   MONOPCT 13*  --  4       BMP:   Recent Labs     12/12/23  0030 12/12/23  0226 12/13/23  0444 12/13/23  0554   * 146*  --  145*   K 4.0 4.1  --  5.0   * 116*  --  116*   CO2 19* 18*  --  17*   BUN  --  40*  --  39*   CREATININE  --  0.9 0.9 0.8   MG 2.5  --   --   --        Hepatic Function Panel:   No results for input(s): \"PROT\", \"LABALBU\", \"BILIDIR\", \"IBILI\", \"BILITOT\", \"ALKPHOS\", \"ALT\", \"AST\" in the last 72 hours.    No results for input(s): \"RPR\" in the last 72 hours.  No results for input(s): \"HIV\" in the last 72 hours.  No results for input(s): \"BC\" in the last 72 hours.  Lab Results   Component Value Date/Time    BACTERIA None 12/10/2023 05:20 PM    RBC 4.61 12/13/2023 05:54 AM    RBC 4.82 05/07/2012 09:00 AM    WBC 15.1 12/13/2023 05:54 AM    TURBIDITY Clear 12/10/2023 05:20 PM     Lab Results   Component Value Date/Time    CREATININE 0.8 12/13/2023 05:54 AM    CREATININE 0.9 12/13/2023 04:44 AM    GLUCOSE 136 12/13/2023 05:54 AM    GLUCOSE 106 05/07/2012 09:00 AM       Medical Decision Making-Imaging:   CT ABDOMEN PELVIS W IV CONTRAST Additional Contrast? None    Result Date: 12/10/2023  EXAMINATION: CT OF THE ABDOMEN AND PELVIS WITH CONTRAST 12/10/2023 8:09 am TECHNIQUE: CT of the abdomen and pelvis was performed with the administration of intravenous contrast. Multiplanar reformatted images are provided for review. Automated exposure control, iterative reconstruction, and/or weight based adjustment of the mA/kV was utilized to reduce the radiation dose to as low as reasonably achievable. COMPARISON: 01/13/2017rrr HISTORY: ORDERING SYSTEM PROVIDED HISTORY: fatigue, diarrhea, \"R sided abdominal masses\" (per pt) TECHNOLOGIST PROVIDED HISTORY: fatigue, diarrhea, \"R sided abdominal masses\" (per pt) Decision Support Exception - unselect if not a suspected or confirmed emergency 
142/76 -- -- 86 22 99 %   12/15/23 0500 123/68 -- -- 60 23 100 %   12/15/23 0430 125/69 -- -- 62 27 99 %   12/15/23 0400 (!) 127/52 98.8 °F (37.1 °C) Axillary (!) 115 26 98 %   12/15/23 0330 (!) 114/56 -- -- 72 27 100 %   12/15/23 0322 -- -- -- 60 23 100 %   12/15/23 0300 (!) 114/55 -- -- 60 21 100 %   12/15/23 0230 (!) 111/57 -- -- 60 20 100 %   12/15/23 0200 129/60 -- -- (!) 101 18 100 %   12/15/23 0130 (!) 102/52 -- -- 60 21 100 %   12/15/23 0115 (!) 98/50 -- -- 60 20 100 %   12/15/23 0100 (!) 97/49 -- -- 60 21 100 %   12/15/23 0000 (!) 151/90 98.5 °F (36.9 °C) Oral (!) 116 24 100 %       General Appearance: Awake, alert, and in no apparent distress  Head:  Normocephalic, no trauma  Eyes: Pupils equal, round, reactive to light; sclera anicteric; conjunctivae pink. No embolic phenomena.  ENT: Oropharynx clear, without erythema, exudate, or thrush. No tenderness of sinuses. Mouth/throat: mucosa pink and moist. No lesions.   Neck:Supple, without lymphadenopathy. Thyroid normal, No bruits.  Pulmonary/Chest: Diminished to auscultation, without wheezes, rales, or rhonchi.  No dullness to percussion.   Cardiovascular: Paced rhythm. Regular rate   Abdomen: Soft, non tender. Bowel sounds normal. No organomegaly. Diarrhea  All four Extremities: No cyanosis, clubbing, edema, or effusions.  Neurologic: No gross sensory or motor deficits.  Skin: Warm and dry with good turgor.No signs of peripheral arterial or venous insufficiency. No ulcerations. No open wounds.    Medical Decision Making -Laboratory:   I have independently reviewed/ordered the following labs:    CBC with Differential:   Recent Labs     12/14/23  1017 12/15/23  0300   WBC 17.3* 12.1*   HGB 14.8 12.5*   HCT 46.8 39.1*    160   LYMPHOPCT 7* 7*   MONOPCT 8 5       BMP:   Recent Labs     12/14/23  1017 12/14/23  1529 12/15/23  0300   * 153* 152*   K 5.6* 5.1 4.6   * 118* 120*   CO2 22 22 19*   BUN 63*  --  74*   CREATININE 1.1  --  1.2 
Value Date/Time    APTT 98.7 12/16/2023 03:14 PM       Comprehensive Metabolic Profile:   Recent Labs     12/17/23  1759 12/18/23  0445 12/19/23  0550   * 152* 149*   K 4.9 4.4 4.8   * 118* 117*   CO2 21 23 22   BUN 43* 48* 52*   CREATININE 0.9 0.8 0.8   GLUCOSE 237* 194* 223*   CALCIUM 8.2* 8.6 8.7        Magnesium:   Lab Results   Component Value Date/Time    MG 3.7 12/17/2023 10:16 PM    MG 3.2 12/17/2023 05:59 PM    MG 2.5 12/12/2023 12:30 AM     Phosphorus: No results found for: \"PHOS\"  Ionized Calcium:   Lab Results   Component Value Date/Time    CAION 1.21 12/17/2023 05:59 PM    CAION 1.06 12/16/2023 11:19 AM    CAION 1.16 06/09/2023 04:53 AM        Urinalysis:   Lab Results   Component Value Date/Time    NITRU NEGATIVE 12/10/2023 05:20 PM    COLORU Yellow 12/10/2023 05:20 PM    PHUR 5.5 12/10/2023 05:20 PM    WBCUA 0 TO 2 12/10/2023 05:20 PM    RBCUA 0 TO 2 12/10/2023 05:20 PM    BACTERIA None 12/10/2023 05:20 PM    SPECGRAV 1.032 12/10/2023 05:20 PM    LEUKOCYTESUR NEGATIVE 12/10/2023 05:20 PM    UROBILINOGEN Normal 12/10/2023 05:20 PM    BILIRUBINUR NEGATIVE 12/10/2023 05:20 PM    GLUCOSEU NEGATIVE 12/10/2023 05:20 PM    KETUA SMALL 12/10/2023 05:20 PM       HgBA1c:    Lab Results   Component Value Date/Time    LABA1C 5.2 10/29/2018 11:02 AM     TSH:    Lab Results   Component Value Date/Time    TSH 0.08 12/16/2023 03:14 PM     Lactic Acid: No results found for: \"LACTA\"   Troponin: Invalid input(s): \"TROPONIN\"        Radiology/Imaging:  XR CHEST PORTABLE    Result Date: 12/14/2023  Worsening left perihilar infiltrates. Stable right infrahilar infiltrates in mid right lung pulmonary infiltrates.     US GALLBLADDER RUQ    Result Date: 12/10/2023  Cholelithiasis without additional evidence acute cholecystitis. No biliary dilatation. Nonvisualized pancreas.     US RENAL COMPLETE    Result Date: 12/10/2023  Mildly echogenic bilateral kidneys which may reflect medical renal disease. No 
Placeholder     Continuous Infusions:   dextrose 75 mL/hr at 12/16/23 0805    dexmedetomidine 1 mcg/kg/hr (12/16/23 0805)    sodium chloride 5 mL/hr at 12/16/23 0805     PRN Meds:   fentanNYL, 25 mcg, Once PRN  metoprolol, 5 mg, Q6H PRN  ziprasidone (GEODON) 20 mg in sterile water 1 mL injection, 20 mg, Q12H PRN  albuterol, 2.5 mg, As Directed RT PRN  sodium chloride flush, 5-40 mL, PRN  sodium chloride, , PRN  potassium chloride, 40 mEq, PRN   Or  potassium alternative oral replacement, 40 mEq, PRN   Or  potassium chloride, 10 mEq, PRN  magnesium sulfate, 2,000 mg, PRN  ondansetron, 4 mg, Q8H PRN   Or  ondansetron, 4 mg, Q6H PRN  polyethylene glycol, 17 g, Daily PRN  acetaminophen, 650 mg, Q6H PRN   Or  acetaminophen, 650 mg, Q6H PRN  sodium chloride, 30 mL, PRN            Ayaka Betts MD  Emergency Medicine Resident PGY-2  12/16/2023 8:10 AM   
congestion.  No sizable effusions. Visualized osseous structures remain unchanged.  Mild diffuse degenerative changes throughout the spine.     1. Bibasilar airspace disease, atelectasis and/or infiltrate.  Mild pulmonary vascular congestion. 2. Left-sided cardiac pacemaker device in place.       Medical Decision Mkuvon-Faxkoafz-Ptwbr:     Results       Procedure Component Value Units Date/Time    Culture, Urine [[id number]] Collected: 12/11/23 1721    Order Status: Sent Specimen: Urine, clean catch Updated: 12/11/23 1722    MRSA DNA Probe, Nasal [[id number]] Collected: 12/10/23 1333    Order Status: Completed Specimen: Nasal Updated: 12/11/23 0929     Specimen Description .NASAL SWAB     MRSA, DNA, Nasal NEGATIVE     Comment: NEGATIVE:  MRSA DNA not detected by nucleic acid amplification.                                                    Results should be used as an adjunct to nosocomial control efforts to identify patients   needing enhanced precautions.    The test is not intended to identify patients with staphylococcal infections.  Results   should not be used to guide or monitor treatment for MRSA infections.         Culture, Respiratory [[id number]]     Order Status: No result Specimen: Sputum Expectorated     Gastrointestinal Panel, Molecular [[id number]] Collected: 12/10/23 1119    Order Status: Sent Specimen: Stool Updated: 12/11/23 1119    C DIFF TOXIN/ANTIGEN [[id number]] Collected: 12/10/23 1119    Order Status: Sent Specimen: Stool Updated: 12/11/23 1119    Culture, Blood 1 [[id number]] Collected: 12/10/23 0918    Order Status: Completed Specimen: Blood Updated: 12/11/23 1007     Specimen Description .BLOOD     Special Requests L FA 18ML     Culture NO GROWTH 1 DAY    Culture, Blood 1 [[id number]] Collected: 12/10/23 0905    Order Status: Completed Specimen: Blood Updated: 12/11/23 1006     Specimen Description .BLOOD     Special Requests R AC 20ML     Culture NO GROWTH 1 DAY    Respiratory Panel, 
suitable admitted adults) [[id number]]  (Abnormal) Collected: 12/10/23 0740    Order Status: Completed Specimen: Nasopharyngeal Swab Updated: 12/10/23 0913     Specimen Description .NASOPHARYNGEAL SWAB     Adenovirus PCR Not Detected     Coronavirus 229E PCR Not Detected     Coronavirus HKU1 PCR Not Detected     Coronavirus NL63 PCR Not Detected     Coronavirus OC43 PCR Not Detected     SARS-CoV-2, PCR DETECTED     Comment: Results reported to the appropriate Health Department        Human Metapneumovirus PCR Not Detected     Rhino/Enterovirus PCR Not Detected     Influenza A by PCR Not Detected     Influenza B by PCR Not Detected     Parainfluenza 1 PCR Not Detected     Parainfluenza 2 PCR Not Detected     Parainfluenza 3 PCR Not Detected     Parainfluenza 4 PCR Not Detected     Resp Syncytial Virus PCR Not Detected     Bordetella parapertussis by PCR Not Detected     B Pertussis by PCR Not Detected     Chlamydia pneumoniae By PCR Not Detected     Mycoplasma pneumo by PCR Not Detected     Comment: Performed by multiplexed nucleic acid assay.                 Medical Decision Making-Other:     Note:        MD Jaqui Jimenes, APRN    ATTESTATION:    I have discussed the case, including pertinent history and exam findings with the APRN. I have evaluated the  History, physical findings and pictures of the patient and the key elements of the encounter have been performed by me. I have reviewed the laboratory data, other diagnostic studies and discussed them with the APRN. I have updated the medical record where necessary.    I agree with the assessment, plan and orders as documented by the APRN.    In addition diagnostic and decision making elements, performed by the Attending Physician, are included in the Diagnostic and Decision Making Section of the text.    Justin Rivera MD             Pager: [phone] - Office: [phone]   
12/10/23 0740    Order Status: Completed Specimen: Nasopharyngeal Swab Updated: 12/10/23 0913     Specimen Description .NASOPHARYNGEAL SWAB     Adenovirus PCR Not Detected     Coronavirus 229E PCR Not Detected     Coronavirus HKU1 PCR Not Detected     Coronavirus NL63 PCR Not Detected     Coronavirus OC43 PCR Not Detected     SARS-CoV-2, PCR DETECTED     Comment: Results reported to the appropriate Health Department        Human Metapneumovirus PCR Not Detected     Rhino/Enterovirus PCR Not Detected     Influenza A by PCR Not Detected     Influenza B by PCR Not Detected     Parainfluenza 1 PCR Not Detected     Parainfluenza 2 PCR Not Detected     Parainfluenza 3 PCR Not Detected     Parainfluenza 4 PCR Not Detected     Resp Syncytial Virus PCR Not Detected     Bordetella parapertussis by PCR Not Detected     B Pertussis by PCR Not Detected     Chlamydia pneumoniae By PCR Not Detected     Mycoplasma pneumo by PCR Not Detected     Comment: Performed by multiplexed nucleic acid assay.                 Medical Decision Making-Other:     Note:      Justin Rivera MD                    Pager: [phone] - Office: [phone]   
1

## 2024-01-08 NOTE — CARE COORDINATION
Transitional Plan  Received a call from [facility] with authorization.    NURSE REPORT PHONE NUMBER: [phone]  FAX: [phone] Faxed [facility] for transportation between 11am - 1 pm.  Requesting return phone call.       Identification   Duration         Pages    Result  [id number]   Jan/8/2024 09:59:33 Send     Analog [id number]  02:45  3  Success     1001: Received a call from JEREMIAS Abdullahi.  Transportation set up for 11 am.    Patient notified on above.  Patient to notify family.  JOSE Perez notified.  HENS completed.  AVS updated.  Faxed both along with transportation time to [place] at the [facility]. Both placed in transport packet.    Transport packet placed in with Soft chart.    If patient is discharged prior to next notation, then this note serves as note for discharge by case managment.

## 2024-01-09 PROBLEM — R79.89 ELEVATED TROPONIN: Status: RESOLVED | Noted: 2023-12-10 | Resolved: 2024-01-09

## 2024-01-11 ENCOUNTER — HOSPITAL ENCOUNTER (OUTPATIENT)
Age: 84
Setting detail: SPECIMEN
Discharge: HOME | End: 2024-01-11

## 2024-01-11 LAB
INR PPP: 3.2
PROTHROMBIN TIME: 32.5 SEC (ref 11.7–14.9)

## 2024-01-11 PROCEDURE — 36415 COLL VENOUS BLD VENIPUNCTURE: CPT

## 2024-01-11 PROCEDURE — 85610 PROTHROMBIN TIME: CPT

## 2024-01-11 PROCEDURE — P9603 ONE-WAY ALLOW PRORATED MILES: HCPCS

## 2024-01-15 ENCOUNTER — HOSPITAL ENCOUNTER (OUTPATIENT)
Age: 84
Setting detail: SPECIMEN
Discharge: HOME | End: 2024-01-15

## 2024-01-15 LAB
INR PPP: 1.7
PROTHROMBIN TIME: 19.8 SEC (ref 11.5–14.2)

## 2024-01-15 PROCEDURE — 36415 COLL VENOUS BLD VENIPUNCTURE: CPT

## 2024-01-15 PROCEDURE — P9603 ONE-WAY ALLOW PRORATED MILES: HCPCS

## 2024-01-15 PROCEDURE — 85610 PROTHROMBIN TIME: CPT

## 2024-01-16 LAB
MICROORGANISM SPEC CULT: NORMAL
MICROORGANISM/AGENT SPEC: NORMAL
SERVICE CMNT-IMP: NORMAL
SPECIMEN DESCRIPTION: NORMAL

## 2024-01-18 ENCOUNTER — HOSPITAL ENCOUNTER (OUTPATIENT)
Age: 84
Setting detail: SPECIMEN
Discharge: HOME | End: 2024-01-18

## 2024-01-18 LAB
INR PPP: 2.2
PROTHROMBIN TIME: 23.8 SEC (ref 11.7–14.9)

## 2024-01-18 PROCEDURE — 36415 COLL VENOUS BLD VENIPUNCTURE: CPT

## 2024-01-18 PROCEDURE — 85610 PROTHROMBIN TIME: CPT

## 2024-01-18 PROCEDURE — P9603 ONE-WAY ALLOW PRORATED MILES: HCPCS

## 2024-01-19 ENCOUNTER — HOSPITAL ENCOUNTER (OUTPATIENT)
Age: 84
Setting detail: SPECIMEN
Discharge: HOME | End: 2024-01-19

## 2024-01-19 LAB
INR PPP: 2.6
PROTHROMBIN TIME: 27.8 SEC (ref 11.7–14.9)

## 2024-01-19 PROCEDURE — 36415 COLL VENOUS BLD VENIPUNCTURE: CPT

## 2024-01-19 PROCEDURE — P9603 ONE-WAY ALLOW PRORATED MILES: HCPCS

## 2024-01-19 PROCEDURE — 85610 PROTHROMBIN TIME: CPT

## 2024-01-22 ENCOUNTER — HOSPITAL ENCOUNTER (OUTPATIENT)
Age: 84
Setting detail: SPECIMEN
Discharge: HOME | End: 2024-01-22

## 2024-01-22 LAB
INR PPP: 2.5
PROTHROMBIN TIME: 26.8 SEC (ref 11.7–14.9)

## 2024-01-22 PROCEDURE — P9603 ONE-WAY ALLOW PRORATED MILES: HCPCS

## 2024-01-22 PROCEDURE — 36415 COLL VENOUS BLD VENIPUNCTURE: CPT

## 2024-01-22 PROCEDURE — 85610 PROTHROMBIN TIME: CPT

## 2024-01-25 ENCOUNTER — HOSPITAL ENCOUNTER (OUTPATIENT)
Age: 84
Setting detail: SPECIMEN
Discharge: HOME | End: 2024-01-25

## 2024-01-25 LAB
INR PPP: 2.7
PROTHROMBIN TIME: 28.5 SEC (ref 11.7–14.9)

## 2024-01-25 PROCEDURE — P9603 ONE-WAY ALLOW PRORATED MILES: HCPCS

## 2024-01-25 PROCEDURE — 85610 PROTHROMBIN TIME: CPT

## 2024-01-25 PROCEDURE — 36415 COLL VENOUS BLD VENIPUNCTURE: CPT

## 2024-02-01 ENCOUNTER — TELEPHONE (OUTPATIENT)
Age: 84
End: 2024-02-01

## 2024-02-01 NOTE — TELEPHONE ENCOUNTER
Kristel from [facility] called. Primary care is requesting patient's peg tube be removed. Order is being faxed to clinic. Patient scheduled for 02/08/24.

## 2024-02-08 ENCOUNTER — OFFICE VISIT (OUTPATIENT)
Age: 84
End: 2024-02-08

## 2024-02-08 VITALS
HEART RATE: 62 BPM | SYSTOLIC BLOOD PRESSURE: 141 MMHG | HEIGHT: 70 IN | WEIGHT: 157 LBS | BODY MASS INDEX: 22.48 KG/M2 | DIASTOLIC BLOOD PRESSURE: 80 MMHG

## 2024-02-08 DIAGNOSIS — Z43.1 PEG (PERCUTANEOUS ENDOSCOPIC GASTROSTOMY) ADJUSTMENT/REPLACEMENT/REMOVAL (HCC): Primary | ICD-10-CM

## 2024-02-08 PROCEDURE — 99024 POSTOP FOLLOW-UP VISIT: CPT | Performed by: SURGERY

## 2024-02-08 NOTE — PROGRESS NOTES
General Surgery   [id number] [facility]  [place] [address]  [phone]  [address] [place] [address]  [phone]  [email or web address]    Clinic Note - Post-op Patient      Patient: Caio Arce  MRN: [id number]  YOB: 1940 (83 y.o.)    Date of Office Visit: February 8, 2024     CC: Post op follow up    SUBJECTIVE:  Caio Arce is a 83 y.o. male who is seen at the [facility] surgery clinic for post op follow up from dysphagia requiring enteral feeding access on 12 23 as he recovered from Covid pneumonia. . Eating a regular diet without difficulty. Bowel movements are Normal.  The patient is not having any pain..         Physical Exam:    Vitals:    02/08/24 1306   BP: (!) 141/80   Pulse:      Physical Exam  Balloon type gastrostomy tube to left of well healed surgical incision      Pathology:     NA      Assessment/Plan:  Removed PEG.  Exuberant granulation tissue cauterized with AG nitrate. Advised to avoit copious intake of liquids for next 48 hours         We will order the following:  No orders of the defined types were placed in this encounter.    he is instructed to follow up in prn weeks.  Discussed plan with patient and all questions answered.    Elinor Hair MD  2/8/2024

## 2024-02-08 NOTE — PROGRESS NOTES
General Surgery   [facility]  [address]  [place] [address]  [phone]  [facility]   [address] [place] [address]  [phone]  [email or web address]    Clinic Note - New Patient    Patient's Name: Caio Arce  MRN: [id number]  YOB: 1940 (83 y.o.)    Date of Visit: February 8, 2024     CC: PEG tube not required anymore    HPI: Caio Arce is a/an 83 y.o. male who presents to [facility] for PEG removal       Past Medical History:   Diagnosis Date    Anxiety     Arthritis     BPH (benign prostatic hyperplasia)     Constipation     COPD (chronic obstructive pulmonary disease) (HCC)     Current use of aspirin     Hyperglycemia     Hyperlipidemia     Hypertension     MVP (mitral valve prolapse)     Osteoarthritis     Reflux     Wears glasses        Past Surgical History:   Procedure Laterality Date    CARDIAC VALVE REPLACEMENT  10/14/2014    GASTROSTOMY TUBE PLACEMENT N/A 12/23/2023    GASTROSTOMY TUBE PLACEMENT performed by Caden Weaver MD at [facility] OR    INGUINAL HERNIA REPAIR Bilateral 94, 96    OTHER SURGICAL HISTORY Left 01/16/2014    excision of groin mass    PACEMAKER PLACEMENT  10/14/2014    [facility] - UNSAFE!!! pacemaker - [employer] - per rep not conditional for MRI    UPPER GASTROINTESTINAL ENDOSCOPY N/A 12/23/2023    EGD DIAGNOSTIC ONLY performed by Caden Weaver MD at [facility] OR       Current Outpatient Medications   Medication Sig Dispense Refill    QUEtiapine (SEROQUEL) 25 MG tablet Take 1 tablet by mouth nightly for 2 doses 2 tablet 0    lansoprazole (PREVACID SOLUTAB) 30 MG disintegrating tablet Take 1 tablet by mouth every morning (before breakfast) 30 tablet 0    albuterol (PROVENTIL) (2.5 MG/3ML) 0.083% nebulizer solution Take 3 mLs by nebulization every 4 hours as needed for Wheezing 120 each 3    fluticasone (FLONASE) 50 MCG/ACT nasal spray 2 sprays by Each Nostril route daily 16 g 3

## 2024-02-08 NOTE — PROGRESS NOTES
General Surgery   [id number] [facility] [place] [address]  [phone]  [place] [address]  [phone]  [email or web address]    Clinic Note - Post-op Patient      Patient: Caio Arce  MRN: [id number]  YOB: 1940 (83 y.o.)    Date of Office Visit: February 8, 2024     CC: Post op follow up    SUBJECTIVE:  Caio Arce is a 83 y.o. male who is seen at the [facility] surgery clinic for post op follow up from G-tube placement 12/23/23. He was discharged from the hospital on 1/8 after admission for covid 19. During admission he had respiratory failure and was intubated. He subsequently failing a swallow study and required a PEG tube insertion. Eating a regular diet {with-without:5700} difficulty. Bowel movements are {normal / abnormal (result):16684::\"Normal\"}.  {pain control:39945::\"The patient is not having any pain.\"}.     Review of Systems:  Review of Systems      Physical Exam:    There were no vitals filed for this visit.  Physical Exam        Assessment/Plan:  ***        We will order the following:  No orders of the defined types were placed in this encounter.    he is instructed to follow up in {NUMBER:83699} weeks.  Discussed plan with patient and all questions answered.    KERRY Vickers - DONITA  2/8/2024

## 2024-02-14 ENCOUNTER — HOSPITAL ENCOUNTER (OUTPATIENT)
Age: 84
Setting detail: OBSERVATION
Discharge: HOME | End: 2024-02-15
Attending: EMERGENCY MEDICINE | Admitting: EMERGENCY MEDICINE
Payer: MEDICARE

## 2024-02-14 ENCOUNTER — APPOINTMENT (OUTPATIENT)
Dept: GENERAL RADIOLOGY | Age: 84
End: 2024-02-14
Payer: MEDICARE

## 2024-02-14 DIAGNOSIS — R07.9 CHEST PAIN, UNSPECIFIED TYPE: Primary | ICD-10-CM

## 2024-02-14 LAB
ANION GAP SERPL CALCULATED.3IONS-SCNC: 9 MMOL/L (ref 9–17)
BASOPHILS # BLD: 0.04 K/UL (ref 0–0.2)
BASOPHILS NFR BLD: 0 % (ref 0–2)
BNP SERPL-MCNC: 2492 PG/ML
BUN SERPL-MCNC: 13 MG/DL (ref 8–23)
CALCIUM SERPL-MCNC: 8.8 MG/DL (ref 8.6–10.4)
CHLORIDE SERPL-SCNC: 106 MMOL/L (ref 98–107)
CO2 SERPL-SCNC: 26 MMOL/L (ref 20–31)
CREAT SERPL-MCNC: 0.5 MG/DL (ref 0.7–1.2)
EKG ATRIAL RATE: 62 BPM
EKG P-R INTERVAL: 208 MS
EKG Q-T INTERVAL: 476 MS
EKG QRS DURATION: 148 MS
EKG QTC CALCULATION (BAZETT): 483 MS
EKG R AXIS: -65 DEGREES
EKG T AXIS: 71 DEGREES
EKG VENTRICULAR RATE: 62 BPM
EOSINOPHIL # BLD: 0.1 K/UL (ref 0–0.44)
EOSINOPHILS RELATIVE PERCENT: 1 % (ref 1–4)
ERYTHROCYTE [DISTWIDTH] IN BLOOD BY AUTOMATED COUNT: 13.8 % (ref 11.8–14.4)
GFR SERPL CREATININE-BSD FRML MDRD: >60 ML/MIN/1.73M2
GLUCOSE SERPL-MCNC: 98 MG/DL (ref 70–99)
HCT VFR BLD AUTO: 40.5 % (ref 40.7–50.3)
HGB BLD-MCNC: 12.6 G/DL (ref 13–17)
IMM GRANULOCYTES # BLD AUTO: 0.05 K/UL (ref 0–0.3)
IMM GRANULOCYTES NFR BLD: 1 %
INR PPP: 2.6
LYMPHOCYTES NFR BLD: 1.42 K/UL (ref 1.1–3.7)
LYMPHOCYTES RELATIVE PERCENT: 15 % (ref 24–43)
MCH RBC QN AUTO: 30.1 PG (ref 25.2–33.5)
MCHC RBC AUTO-ENTMCNC: 31.1 G/DL (ref 28.4–34.8)
MCV RBC AUTO: 96.7 FL (ref 82.6–102.9)
MONOCYTES NFR BLD: 0.57 K/UL (ref 0.1–1.2)
MONOCYTES NFR BLD: 6 % (ref 3–12)
NEUTROPHILS NFR BLD: 77 % (ref 36–65)
NEUTS SEG NFR BLD: 7.49 K/UL (ref 1.5–8.1)
NRBC BLD-RTO: 0 PER 100 WBC
PARTIAL THROMBOPLASTIN TIME: 41.4 SEC (ref 23–36.5)
PLATELET # BLD AUTO: 175 K/UL (ref 138–453)
PMV BLD AUTO: 10.4 FL (ref 8.1–13.5)
POTASSIUM SERPL-SCNC: 4.1 MMOL/L (ref 3.7–5.3)
PROTHROMBIN TIME: 27.1 SEC (ref 11.7–14.9)
RBC # BLD AUTO: 4.19 M/UL (ref 4.21–5.77)
SODIUM SERPL-SCNC: 141 MMOL/L (ref 135–144)
TROPONIN I SERPL HS-MCNC: 13 NG/L (ref 0–22)
TROPONIN I SERPL HS-MCNC: 25 NG/L (ref 0–22)
WBC OTHER # BLD: 9.7 K/UL (ref 3.5–11.3)

## 2024-02-14 PROCEDURE — 2580000003 HC RX 258: Performed by: STUDENT IN AN ORGANIZED HEALTH CARE EDUCATION/TRAINING PROGRAM

## 2024-02-14 PROCEDURE — 85610 PROTHROMBIN TIME: CPT

## 2024-02-14 PROCEDURE — 85025 COMPLETE CBC W/AUTO DIFF WBC: CPT

## 2024-02-14 PROCEDURE — G0378 HOSPITAL OBSERVATION PER HR: HCPCS

## 2024-02-14 PROCEDURE — 85730 THROMBOPLASTIN TIME PARTIAL: CPT

## 2024-02-14 PROCEDURE — 96374 THER/PROPH/DIAG INJ IV PUSH: CPT

## 2024-02-14 PROCEDURE — 80048 BASIC METABOLIC PNL TOTAL CA: CPT

## 2024-02-14 PROCEDURE — 83880 ASSAY OF NATRIURETIC PEPTIDE: CPT

## 2024-02-14 PROCEDURE — 93005 ELECTROCARDIOGRAM TRACING: CPT | Performed by: EMERGENCY MEDICINE

## 2024-02-14 PROCEDURE — 84484 ASSAY OF TROPONIN QUANT: CPT

## 2024-02-14 PROCEDURE — 71046 X-RAY EXAM CHEST 2 VIEWS: CPT

## 2024-02-14 PROCEDURE — 6360000002 HC RX W HCPCS: Performed by: STUDENT IN AN ORGANIZED HEALTH CARE EDUCATION/TRAINING PROGRAM

## 2024-02-14 PROCEDURE — 6370000000 HC RX 637 (ALT 250 FOR IP): Performed by: STUDENT IN AN ORGANIZED HEALTH CARE EDUCATION/TRAINING PROGRAM

## 2024-02-14 PROCEDURE — 99285 EMERGENCY DEPT VISIT HI MDM: CPT

## 2024-02-14 RX ORDER — FLUTICASONE PROPIONATE 50 MCG
2 SPRAY, SUSPENSION (ML) NASAL DAILY
Status: DISCONTINUED | OUTPATIENT
Start: 2024-02-14 | End: 2024-02-15 | Stop reason: HOSPADM

## 2024-02-14 RX ORDER — ALBUTEROL SULFATE 2.5 MG/3ML
2.5 SOLUTION RESPIRATORY (INHALATION) EVERY 4 HOURS PRN
Status: DISCONTINUED | OUTPATIENT
Start: 2024-02-14 | End: 2024-02-15 | Stop reason: HOSPADM

## 2024-02-14 RX ORDER — ATORVASTATIN CALCIUM 20 MG/1
20 TABLET, FILM COATED ORAL DAILY
Status: DISCONTINUED | OUTPATIENT
Start: 2024-02-14 | End: 2024-02-15 | Stop reason: HOSPADM

## 2024-02-14 RX ORDER — SODIUM CHLORIDE 0.9 % (FLUSH) 0.9 %
5-40 SYRINGE (ML) INJECTION EVERY 12 HOURS SCHEDULED
Status: DISCONTINUED | OUTPATIENT
Start: 2024-02-14 | End: 2024-02-15 | Stop reason: HOSPADM

## 2024-02-14 RX ORDER — ONDANSETRON 4 MG/1
4 TABLET, ORALLY DISINTEGRATING ORAL EVERY 4 HOURS PRN
Status: DISCONTINUED | OUTPATIENT
Start: 2024-02-14 | End: 2024-02-15 | Stop reason: HOSPADM

## 2024-02-14 RX ORDER — IPRATROPIUM BROMIDE AND ALBUTEROL SULFATE 2.5; .5 MG/3ML; MG/3ML
1 SOLUTION RESPIRATORY (INHALATION) EVERY 4 HOURS PRN
Status: DISCONTINUED | OUTPATIENT
Start: 2024-02-14 | End: 2024-02-15 | Stop reason: HOSPADM

## 2024-02-14 RX ORDER — SODIUM CHLORIDE 0.9 % (FLUSH) 0.9 %
5-40 SYRINGE (ML) INJECTION PRN
Status: DISCONTINUED | OUTPATIENT
Start: 2024-02-14 | End: 2024-02-15 | Stop reason: HOSPADM

## 2024-02-14 RX ORDER — SODIUM CHLORIDE 9 MG/ML
INJECTION, SOLUTION INTRAVENOUS PRN
Status: DISCONTINUED | OUTPATIENT
Start: 2024-02-14 | End: 2024-02-15 | Stop reason: HOSPADM

## 2024-02-14 RX ORDER — WARFARIN SODIUM 3 MG/1
3 TABLET ORAL
Status: DISCONTINUED | OUTPATIENT
Start: 2024-02-14 | End: 2024-02-15 | Stop reason: HOSPADM

## 2024-02-14 RX ORDER — ASPIRIN 81 MG/1
81 TABLET, CHEWABLE ORAL DAILY
Status: DISCONTINUED | OUTPATIENT
Start: 2024-02-14 | End: 2024-02-15 | Stop reason: HOSPADM

## 2024-02-14 RX ORDER — WARFARIN SODIUM 2 MG/1
2 TABLET ORAL
Status: DISCONTINUED | OUTPATIENT
Start: 2024-02-16 | End: 2024-02-15 | Stop reason: HOSPADM

## 2024-02-14 RX ORDER — ACETAMINOPHEN 325 MG/1
650 TABLET ORAL EVERY 4 HOURS PRN
Status: DISCONTINUED | OUTPATIENT
Start: 2024-02-14 | End: 2024-02-15 | Stop reason: HOSPADM

## 2024-02-14 RX ORDER — ALLOPURINOL 100 MG/1
100 TABLET ORAL DAILY
Status: DISCONTINUED | OUTPATIENT
Start: 2024-02-14 | End: 2024-02-15 | Stop reason: HOSPADM

## 2024-02-14 RX ORDER — ONDANSETRON 2 MG/ML
4 INJECTION INTRAMUSCULAR; INTRAVENOUS EVERY 6 HOURS PRN
Status: DISCONTINUED | OUTPATIENT
Start: 2024-02-14 | End: 2024-02-15 | Stop reason: HOSPADM

## 2024-02-14 RX ORDER — FENTANYL CITRATE 50 UG/ML
50 INJECTION, SOLUTION INTRAMUSCULAR; INTRAVENOUS ONCE
Status: COMPLETED | OUTPATIENT
Start: 2024-02-14 | End: 2024-02-14

## 2024-02-14 RX ADMIN — FENTANYL CITRATE 50 MCG: 50 INJECTION, SOLUTION INTRAMUSCULAR; INTRAVENOUS at 15:20

## 2024-02-14 RX ADMIN — WARFARIN SODIUM 3 MG: 3 TABLET ORAL at 18:35

## 2024-02-14 RX ADMIN — SODIUM CHLORIDE, PRESERVATIVE FREE 10 ML: 5 INJECTION INTRAVENOUS at 22:17

## 2024-02-14 RX ADMIN — ATORVASTATIN CALCIUM 20 MG: 20 TABLET, FILM COATED ORAL at 18:35

## 2024-02-14 ASSESSMENT — HEART SCORE: ECG: 1

## 2024-02-14 ASSESSMENT — LIFESTYLE VARIABLES: HOW OFTEN DO YOU HAVE A DRINK CONTAINING ALCOHOL: NEVER

## 2024-02-14 ASSESSMENT — PAIN SCALES - GENERAL
PAINLEVEL_OUTOF10: 0
PAINLEVEL_OUTOF10: 2

## 2024-02-14 ASSESSMENT — PAIN - FUNCTIONAL ASSESSMENT: PAIN_FUNCTIONAL_ASSESSMENT: 0-10

## 2024-02-14 NOTE — ED PROVIDER NOTES
[facility] ED  Emergency Department Encounter  Emergency Medicine Resident     Pt Name:Caio Arce  MRN: [id number]  Birthdate 1940  Date of evaluation: 2/14/24  PCP:  Jaqui Watkins PA-C  Note Started: 1:30 PM EST      CHIEF COMPLAINT       Chief Complaint   Patient presents with    Chest Pain       HISTORY OF PRESENT ILLNESS  (Location/Symptom, Timing/Onset, Context/Setting, Quality, Duration, Modifying Factors, Severity.)      Caio Arce is a 83 y.o. male who presents with chest pain.  Patient states he woke up around 5 AM this morning was having chest pain.  Did not have any chest pain yesterday when he went to bed, was feeling fine.  Does have some intermittent shortness of breath.  No nausea or vomiting, no diaphoresis.  Patient states he recently had a prolonged stay in the hospital, ICU and stepdown for COVID-pneumonia.  Per chart review at that time patient also had an NSTEMI.  Was evaluated by cardiology.  Cardiology plan to pursue further ischemic workup in the outpatient setting once acute issues resolved, patient has not yet followed up with cardiology.  Patient has no stents in place.  Patient does have a mechanical valve, on Coumadin.  Patient states he did have a stress test a while ago, nothing recent.  No recent fevers or chills, cough or congestion.  Patient states he is has never felt pain like this before.  Patient states that he did take full dose aspirin as well as 1 nitroglycerin prior to arrival here with some improvement of his pain.    PAST MEDICAL / SURGICAL / SOCIAL / FAMILY HISTORY      has a past medical history of Anxiety, Arthritis, BPH (benign prostatic hyperplasia), Constipation, COPD (chronic obstructive pulmonary disease) ([facility]), Current use of aspirin, Hyperglycemia, Hyperlipidemia, Hypertension, MVP (mitral valve prolapse), Osteoarthritis, Reflux, and Wears glasses.       has a past surgical history that includes pacemaker placement  T waves flattened in V4, V5, V6, nonspecific EKG    All EKG's are interpreted by the Emergency Department Physician who either signs or Co-signs this chart in the absence of a cardiologist.    EMERGENCY DEPARTMENT COURSE:      ED Course as of 02/14/24 1601   Wed Feb 14, 2024   1343 Received call from [employer] regarding patient's pacer interrogation, no obvious abnormalities [AB]   1400 WBC: 9.7 [AB]   1400 Hemoglobin Quant(!): 12.6 [AB]   1400 INR: 2.6  On Coumadin [AB]   1401 APTT(!): 41.4 [AB]   1414 Troponin, High Sensitivity(!): 25  Decreased from prior, will trend [AB]   1414 Creatinine(!): 0.5 [AB]   1414 Pro-BNP(!): 2,492  Decreased from prior [AB]   1414 XR CHEST (2 VW)  IMPRESSION:  No acute pulmonary finding.   [AB]   1545 Troponin, High Sensitivity: 13  Downtrended [AB]   1545 Heart score of 6.  Will plan on admission to observation unit for further evaluation with cardiology for chest pain.  Patient in agreement [AB]      ED Course User Index  [AB] Julianna Johnson DO       PROCEDURES:  None    CONSULTS:  PHARMACY TO DOSE WARFARIN  IP CONSULT TO CARDIOLOGY    CRITICAL CARE:  There was significant risk of life threatening deterioration of patient's condition requiring my direct management. Critical care time 0 minutes, excluding any documented procedures.    FINAL IMPRESSION      1. Chest pain, unspecified type          DISPOSITION / PLAN     DISPOSITION Admitted 02/14/2024 03:52:47 PM      PATIENT REFERRED TO:  No follow-up provider specified.    DISCHARGE MEDICATIONS:  New Prescriptions    No medications on file       Julianna HERNANDEZ,   Emergency Medicine Resident    (Please note that portions of this note were completed with a voice recognition program.  Efforts were made to edit the dictations but occasionally words are mis-transcribed.)

## 2024-02-14 NOTE — ED NOTES
ED to inpatient nurses report      Chief Complaint:  Chief Complaint   Patient presents with    Chest Pain     Present to ED from: home via EMS with c/o chest pain since this morning. EMS reports patient's wife gave 324 ASA and one of nitro prior to EMS arrival. Pt recently had pacemaker replaced in June 2023.     MOA:     LOC: alert and orientated to name, place, date  Mobility: Requires assistance * 1  Oxygen Baseline: 98% RA    Current needs required: None   Pending ED orders: None  Present condition: Stable    Why did the patient come to the ED? Chest pain  What is the plan? Pain management, admit  Any procedures or intervention occur? Xray, EKG, labs  Any safety concerns?? Fall risk     Mental Status:  Level of Consciousness: Alert (0)    Psych Assessment:   Psychosocial  Psychosocial (WDL): Within Defined Limits  Vital signs   Vitals:    02/14/24 1402 02/14/24 1520 02/14/24 1525 02/14/24 1531   BP: (!) 180/91 (!) 173/95  (!) 165/77   Pulse: 60 60  61   Resp: 14 23  10   Temp:   97 °F (36.1 °C)    SpO2: 96% 96%  93%        Vitals:  Patient Vitals for the past 24 hrs:   BP Temp Pulse Resp SpO2   02/14/24 1531 (!) 165/77 -- 61 10 93 %   02/14/24 1525 -- 97 °F (36.1 °C) -- -- --   02/14/24 1520 (!) 173/95 -- 60 23 96 %   02/14/24 1402 (!) 180/91 -- 60 14 96 %   02/14/24 1328 (!) 166/96 -- -- -- --   02/14/24 1327 -- -- 80 14 98 %      Visit Vitals  BP (!) 165/77   Pulse 61   Temp 97 °F (36.1 °C)   Resp 10   SpO2 93%        LDAs:   Peripheral IV 02/14/24 Right Antecubital (Active)       Peripheral IV 02/14/24 Left Forearm (Active)   Site Assessment Clean, dry & intact 02/14/24 1336   Line Status Blood return noted 02/14/24 1336       Ambulatory Status:  No data recorded    Diagnosis:  DISPOSITION Admitted 02/14/2024 03:52:47 PM   Final diagnoses:   Chest pain, unspecified type        Consults:  PHARMACY TO DOSE WARFARIN  IP CONSULT TO CARDIOLOGY     Treatment Team:   Treatment Team: Attending Provider: Delano

## 2024-02-14 NOTE — PROGRESS NOTES
Pharmacy Note  Warfarin Consult    Caio Arce is a 83 y.o. male for whom pharmacy has been consulted to manage warfarin therapy.     Consulting Physician: Alex  Reason for Admission: chest pain    Warfarin dose prior to admission: 2 mg Monday and Friday 3 mg all other days  Warfarin indication: afib and mitral valve  Target INR range: 2.5-3.5     Past Medical History:   Diagnosis Date    Anxiety     Arthritis     BPH (benign prostatic hyperplasia)     Constipation     COPD (chronic obstructive pulmonary disease) (HCC)     Current use of aspirin     Hyperglycemia     Hyperlipidemia     Hypertension     MVP (mitral valve prolapse)     Osteoarthritis     Reflux     Wears glasses                 Recent Labs     02/14/24  1335   INR 2.6     Recent Labs     02/14/24  1335   HGB 12.6*   HCT 40.5*          Current warfarin drug-drug interactions: NA      Date             INR        Dose   2/14/2024            2.6           Daily PT/INR while inpatient.     Thank you for the consult.  Will continue to follow.    Will continue home regimen at this time   Camilo Haji Pharm.D., BCCCP

## 2024-02-14 NOTE — ED NOTES
Pt presents to the ED via EMS with c/o chest pain since this morning.  Pt states left sided chest pain that radiated to right side.   EMS reports pt took one of nitro at home and 324 of ASA prior to arrival.  Pt states he had a new [employer] pacemaker placed in June. Pt also states on coumadin.  Pt denies shortness of breath and is 98% on room air.  Pt is a/o x4. VSS. Pt placed on monitor. ED staff at bedside.

## 2024-02-14 NOTE — ED PROVIDER NOTES
[facility]     Emergency Department     Faculty Attestation    I performed a history and physical examination of the patient and discussed management with the resident. I reviewed the resident’s note and agree with the documented findings and plan of care. Any areas of disagreement are noted on the chart. I was personally present for the key portions of any procedures. I have documented in the chart those procedures where I was not present during the key portions. I have reviewed the emergency nurses triage note. I agree with the chief complaint, past medical history, past surgical history, allergies, medications, social and family history as documented unless otherwise noted below. Documentation of the HPI, Physical Exam and Medical Decision Making performed by medical students or scribes is based on my personal performance of the HPI, PE and MDM. For Physician Assistant/ Nurse Practitioner cases/documentation I have personally evaluated this patient and have completed at least one if not all key elements of the E/M (history, physical exam, and MDM). Additional findings are as noted.    Vital signs:   Vitals:    02/14/24 1328   BP: (!) 166/96   Pulse:    Resp:    SpO2:       83-year-old male presents with left-sided chest pain with shortness of breath.  Patient has a known history of a non-ST elevation MI.  Prior admission for COVID-pneumonia, requiring intensive care unit.  He also has a known history of sick sinus syndrome status post pacemaker.  He has a history of mitral valve repair, and is on Coumadin.  On exam, the patient is alert and afebrile.  Breath sounds are clear.  Cardiac exam with a normal rate, regular rhythm.  Valve sounds consistent with mechanical valve.  Abdomen is soft and nontender.  Extremities with 1+ edema bilaterally.  No calf tenderness.    EKG Interpretation    Interpreted by emergency department physician    Rhythm: paced  Rate: normal  Axis: left  Ectopy:  none  Conduction: paced  ST Segments: nonspecific changes  T Waves: non specific changes  Q Waves: none    Clinical Impression: non-specific EKG    MD Aga Umana M.D,  Attending Emergency  Physician            Aga El MD  02/14/24 1405

## 2024-02-15 ENCOUNTER — APPOINTMENT (OUTPATIENT)
Dept: NUCLEAR MEDICINE | Age: 84
End: 2024-02-15
Payer: MEDICARE

## 2024-02-15 ENCOUNTER — HOSPITAL ENCOUNTER (OUTPATIENT)
Age: 84
Setting detail: OBSERVATION
Discharge: HOME | End: 2024-02-17
Payer: MEDICARE

## 2024-02-15 VITALS
HEART RATE: 61 BPM | TEMPERATURE: 96.8 F | HEIGHT: 69 IN | OXYGEN SATURATION: 93 % | WEIGHT: 160 LBS | RESPIRATION RATE: 18 BRPM | SYSTOLIC BLOOD PRESSURE: 164 MMHG | DIASTOLIC BLOOD PRESSURE: 74 MMHG | BODY MASS INDEX: 23.7 KG/M2

## 2024-02-15 PROBLEM — I24.9 ACS (ACUTE CORONARY SYNDROME) (HCC): Status: ACTIVE | Noted: 2024-02-15

## 2024-02-15 LAB
ECHO BSA: 1.88 M2
EKG ATRIAL RATE: 62 BPM
EKG ATRIAL RATE: 64 BPM
EKG P-R INTERVAL: 192 MS
EKG P-R INTERVAL: 208 MS
EKG Q-T INTERVAL: 462 MS
EKG Q-T INTERVAL: 476 MS
EKG QRS DURATION: 148 MS
EKG QRS DURATION: 148 MS
EKG QTC CALCULATION (BAZETT): 483 MS
EKG QTC CALCULATION (BAZETT): 484 MS
EKG R AXIS: -65 DEGREES
EKG R AXIS: -79 DEGREES
EKG T AXIS: 71 DEGREES
EKG T AXIS: 97 DEGREES
EKG VENTRICULAR RATE: 62 BPM
EKG VENTRICULAR RATE: 66 BPM
INR PPP: 2.5
PROTHROMBIN TIME: 25.9 SEC (ref 11.7–14.9)
STRESS BASELINE DIAS BP: 99 MMHG
STRESS BASELINE HR: 67 BPM
STRESS BASELINE SYS BP: 168 MMHG
STRESS ESTIMATED WORKLOAD: 1 METS
STRESS PEAK DIAS BP: 99 MMHG
STRESS PEAK SYS BP: 168 MMHG
STRESS PERCENT HR ACHIEVED: 60 %
STRESS POST PEAK HR: 82 BPM
STRESS RATE PRESSURE PRODUCT: NORMAL BPM*MMHG
STRESS TARGET HR: 137 BPM

## 2024-02-15 PROCEDURE — 93005 ELECTROCARDIOGRAM TRACING: CPT | Performed by: STUDENT IN AN ORGANIZED HEALTH CARE EDUCATION/TRAINING PROGRAM

## 2024-02-15 PROCEDURE — 93017 CV STRESS TEST TRACING ONLY: CPT

## 2024-02-15 PROCEDURE — 3430000000 HC RX DIAGNOSTIC RADIOPHARMACEUTICAL: Performed by: EMERGENCY MEDICINE

## 2024-02-15 PROCEDURE — 2580000003 HC RX 258: Performed by: STUDENT IN AN ORGANIZED HEALTH CARE EDUCATION/TRAINING PROGRAM

## 2024-02-15 PROCEDURE — 2580000003 HC RX 258: Performed by: EMERGENCY MEDICINE

## 2024-02-15 PROCEDURE — 78452 HT MUSCLE IMAGE SPECT MULT: CPT | Performed by: INTERNAL MEDICINE

## 2024-02-15 PROCEDURE — A9500 TC99M SESTAMIBI: HCPCS | Performed by: EMERGENCY MEDICINE

## 2024-02-15 PROCEDURE — G0378 HOSPITAL OBSERVATION PER HR: HCPCS

## 2024-02-15 PROCEDURE — 2580000003 HC RX 258

## 2024-02-15 PROCEDURE — 93010 ELECTROCARDIOGRAM REPORT: CPT | Performed by: INTERNAL MEDICINE

## 2024-02-15 PROCEDURE — 6370000000 HC RX 637 (ALT 250 FOR IP): Performed by: STUDENT IN AN ORGANIZED HEALTH CARE EDUCATION/TRAINING PROGRAM

## 2024-02-15 PROCEDURE — 78452 HT MUSCLE IMAGE SPECT MULT: CPT

## 2024-02-15 PROCEDURE — 36415 COLL VENOUS BLD VENIPUNCTURE: CPT

## 2024-02-15 PROCEDURE — 99223 1ST HOSP IP/OBS HIGH 75: CPT | Performed by: INTERNAL MEDICINE

## 2024-02-15 PROCEDURE — 6360000002 HC RX W HCPCS

## 2024-02-15 PROCEDURE — 85610 PROTHROMBIN TIME: CPT

## 2024-02-15 PROCEDURE — 93016 CV STRESS TEST SUPVJ ONLY: CPT | Performed by: INTERNAL MEDICINE

## 2024-02-15 RX ORDER — TETRAKIS(2-METHOXYISOBUTYLISOCYANIDE)COPPER(I) TETRAFLUOROBORATE 1 MG/ML
13.3 INJECTION, POWDER, LYOPHILIZED, FOR SOLUTION INTRAVENOUS
Status: COMPLETED | OUTPATIENT
Start: 2024-02-15 | End: 2024-02-15

## 2024-02-15 RX ORDER — NITROGLYCERIN 0.4 MG/1
0.4 TABLET SUBLINGUAL EVERY 5 MIN PRN
Status: DISCONTINUED | OUTPATIENT
Start: 2024-02-15 | End: 2024-02-15 | Stop reason: ALTCHOICE

## 2024-02-15 RX ORDER — TETRAKIS(2-METHOXYISOBUTYLISOCYANIDE)COPPER(I) TETRAFLUOROBORATE 1 MG/ML
47 INJECTION, POWDER, LYOPHILIZED, FOR SOLUTION INTRAVENOUS
Status: COMPLETED | OUTPATIENT
Start: 2024-02-15 | End: 2024-02-15

## 2024-02-15 RX ORDER — SODIUM CHLORIDE 9 MG/ML
500 INJECTION, SOLUTION INTRAVENOUS CONTINUOUS PRN
Status: DISCONTINUED | OUTPATIENT
Start: 2024-02-15 | End: 2024-02-15 | Stop reason: ALTCHOICE

## 2024-02-15 RX ORDER — ATROPINE SULFATE 0.1 MG/ML
0.5 INJECTION INTRAVENOUS EVERY 5 MIN PRN
Status: DISCONTINUED | OUTPATIENT
Start: 2024-02-15 | End: 2024-02-15 | Stop reason: ALTCHOICE

## 2024-02-15 RX ORDER — ALBUTEROL SULFATE 90 UG/1
2 AEROSOL, METERED RESPIRATORY (INHALATION) PRN
Status: DISCONTINUED | OUTPATIENT
Start: 2024-02-15 | End: 2024-02-15 | Stop reason: ALTCHOICE

## 2024-02-15 RX ORDER — SODIUM CHLORIDE 0.9 % (FLUSH) 0.9 %
10 SYRINGE (ML) INJECTION PRN
Status: DISCONTINUED | OUTPATIENT
Start: 2024-02-15 | End: 2024-02-15 | Stop reason: HOSPADM

## 2024-02-15 RX ORDER — AMINOPHYLLINE 25 MG/ML
50 INJECTION, SOLUTION INTRAVENOUS PRN
Status: DISCONTINUED | OUTPATIENT
Start: 2024-02-15 | End: 2024-02-15 | Stop reason: ALTCHOICE

## 2024-02-15 RX ORDER — METOPROLOL TARTRATE 1 MG/ML
5 INJECTION, SOLUTION INTRAVENOUS EVERY 5 MIN PRN
Status: DISCONTINUED | OUTPATIENT
Start: 2024-02-15 | End: 2024-02-15 | Stop reason: ALTCHOICE

## 2024-02-15 RX ORDER — SODIUM CHLORIDE 0.9 % (FLUSH) 0.9 %
5-40 SYRINGE (ML) INJECTION PRN
Status: DISCONTINUED | OUTPATIENT
Start: 2024-02-15 | End: 2024-02-15 | Stop reason: ALTCHOICE

## 2024-02-15 RX ORDER — REGADENOSON 0.08 MG/ML
0.4 INJECTION, SOLUTION INTRAVENOUS
Status: COMPLETED | OUTPATIENT
Start: 2024-02-15 | End: 2024-02-15

## 2024-02-15 RX ADMIN — ASPIRIN 81 MG 81 MG: 81 TABLET ORAL at 08:21

## 2024-02-15 RX ADMIN — ALLOPURINOL 100 MG: 100 TABLET ORAL at 08:21

## 2024-02-15 RX ADMIN — SODIUM CHLORIDE, PRESERVATIVE FREE 10 ML: 5 INJECTION INTRAVENOUS at 12:18

## 2024-02-15 RX ADMIN — ATORVASTATIN CALCIUM 20 MG: 20 TABLET, FILM COATED ORAL at 08:21

## 2024-02-15 RX ADMIN — REGADENOSON 0.4 MG: 0.08 INJECTION, SOLUTION INTRAVENOUS at 12:15

## 2024-02-15 RX ADMIN — SODIUM CHLORIDE, PRESERVATIVE FREE 10 ML: 5 INJECTION INTRAVENOUS at 12:02

## 2024-02-15 RX ADMIN — TETRAKIS(2-METHOXYISOBUTYLISOCYANIDE)COPPER(I) TETRAFLUOROBORATE 13.3 MILLICURIE: 1 INJECTION, POWDER, LYOPHILIZED, FOR SOLUTION INTRAVENOUS at 12:18

## 2024-02-15 RX ADMIN — SODIUM CHLORIDE, PRESERVATIVE FREE 10 ML: 5 INJECTION INTRAVENOUS at 13:40

## 2024-02-15 RX ADMIN — SODIUM CHLORIDE, PRESERVATIVE FREE 10 ML: 5 INJECTION INTRAVENOUS at 08:22

## 2024-02-15 RX ADMIN — TETRAKIS(2-METHOXYISOBUTYLISOCYANIDE)COPPER(I) TETRAFLUOROBORATE 47 MILLICURIE: 1 INJECTION, POWDER, LYOPHILIZED, FOR SOLUTION INTRAVENOUS at 13:40

## 2024-02-15 NOTE — H&P
[facility]  CDU / OBSERVATION ENCOUNTER  ATTENDING NOTE     Pt Name: Caio Arce  MRN: [id number]  Birthdate 1940  Date of evaluation: 2/15/24  Patient's PCP is :  Jaqui Watkins PA-C    CHIEF COMPLAINT       Chief Complaint   Patient presents with    Chest Pain         HISTORY OF PRESENT ILLNESS    Caio Arce is a 83 y.o. male who presents with chest pain that woke him up from sleep at 5 AM yesterday morning.  States the pain starts on the right side of his chest and radiates anteriorly to the left side of his chest.  Was feeling fine prior to this happening.  Denies shortness of breath.  Reports that he had some associated nausea but denies any vomiting or abdominal pain.  No diaphoresis.  Patient has a mechanical valve and is on Coumadin, does not have any stents.    Per ED resident note, patient was recently in the ICU/stepdown for COVID-pneumonia and found to have an NSTEMI with plans for ischemic workup outpatient.  Patient has not yet followed up with cardiology.    On evaluation this morning, patient states that his chest pain has resolved.  He is currently resting comfortably.    History was obtained in part through review of the ED chart. When possible, a direct discussion was had with ED nurses, residents, and attendings  REVIEW OF SYSTEMS       General ROS - No fevers, No malaise   Ophthalmic ROS - No discharge, No changes in vision  ENT ROS -  No sore throat, No rhinorrhea,   Respiratory ROS - no shortness of breath, no cough, no  wheezing  Cardiovascular ROS - No chest pain, no dyspnea on exertion  Gastrointestinal ROS - No abdominal pain, no nausea or vomiting, no change in bowel habits, no black or bloody stools  Genito-Urinary ROS - No dysuria, trouble voiding, or hematuria  Musculoskeletal ROS - No myalgias, No arthalgias  Neurological ROS - No headache, no dizziness/lightheadedness, No focal weakness, no loss of sensation  Dermatological ROS - No lesions, No  imaging.  No skeletal finding.     No acute pulmonary finding.       LABS:  I have reviewed and interpreted all available lab results.  Labs Reviewed   CBC WITH AUTO DIFFERENTIAL - Abnormal; Notable for the following components:       Result Value    RBC 4.19 (*)     Hemoglobin 12.6 (*)     Hematocrit 40.5 (*)     Neutrophils % 77 (*)     Lymphocytes % 15 (*)     Immature Granulocytes 1 (*)     All other components within normal limits   BASIC METABOLIC PANEL - Abnormal; Notable for the following components:    Creatinine 0.5 (*)     All other components within normal limits   PROTIME-INR - Abnormal; Notable for the following components:    Protime 27.1 (*)     All other components within normal limits   TROPONIN - Abnormal; Notable for the following components:    Troponin, High Sensitivity 25 (*)     All other components within normal limits   APTT - Abnormal; Notable for the following components:    APTT 41.4 (*)     All other components within normal limits   BRAIN NATRIURETIC PEPTIDE - Abnormal; Notable for the following components:    Pro-BNP 2,492 (*)     All other components within normal limits   TROPONIN         CDU IMPRESSION / PLAN      Caio Arce is a 83 y.o. male who presents with chest pain    Chest pain  Patient's pacemaker was interrogated in the ED, no obvious abnormalities per [employer]  Troponins: 25, 13  Heart score 6  BNP: 2,492  Cardiology consulted, appreciate recommendations  Stress test ordered  D/C pending results   Mechanical heart valve on Coumadin  PTT in the ED was 41.4, INR 2.6  Pharmacy to assist in dosing Coumadin, appreciate recommendations  Continue home medications and pain control  Monitor vitals, labs, and imaging  DISPO: pending consults and clinical improvement    CONSULTS:    PHARMACY TO DOSE WARFARIN  IP CONSULT TO CARDIOLOGY    PROCEDURES:  Not indicated       PATIENT REFERRED TO:    No follow-up provider specified.    --  Luz Maria Dwyer MD   Emergency Medicine

## 2024-02-15 NOTE — PROGRESS NOTES
Pharmacy Note  Warfarin Consult follow-up      Recent Labs     02/15/24  1058   INR 2.5     Recent Labs     02/14/24  1335   HGB 12.6*   HCT 40.5*          Significant Drug-Drug Interactions:  New warfarin drug-drug interactions: NA  Discontinued drug-drug interactions: NA      Notes:                     Date             INR        Dose   2/14/2024            2.6       3 mg   2/15                   2.5           3 mg     Daily PT/INR while inpatient.   Will continue home regimen at this time    Camilo Haji Pharm.D., BCCCP

## 2024-02-15 NOTE — CONSULTS
[facility]   Consult Note                 Date:   2/15/2024  Patient name: Caio Arce  Date of admission:  2/14/2024  1:27 PM  MRN:   [id number]  YOB: 1940    Reason for Consult:  chest pain, cardiac history      CHIEF COMPLAINT:  chest pain     History Obtained From:  Patient     HISTORY OF PRESENT ILLNESS:    The patient is a 83 y.o. male  who presented yesterday with chest pain. He describes it as right and left sided, some pressure, non radiating, associated with nausea. He denies shortness of breath, arm/jaw pain, palpitations, sweating. He took nitro and aspirin which provided relief. Trops were 25 and 13 in the ED. EKG showed AV paced rhythm with nonspecific changes. CXR negative. Echo in December 23 was overall unremarkable. He did have a month long hospital day from early December to early January for Covid pneumonia during which he had an NSTEMI. He also has a mechanical mitral valve and pacemaker, follows with Dr. Zimmerman. BP has been elevated, otherwise VSS. On coumadin, INR 2.6.      Past Medical History:   has a past medical history of Anxiety, Arthritis, BPH (benign prostatic hyperplasia), Constipation, COPD (chronic obstructive pulmonary disease) (HCC), Current use of aspirin, Hyperglycemia, Hyperlipidemia, Hypertension, MVP (mitral valve prolapse), Osteoarthritis, Reflux, and Wears glasses.    Past Surgical History:   has a past surgical history that includes pacemaker placement (10/14/2014); Inguinal hernia repair (Bilateral, 94, 96); Cardiac valve replacement (10/14/2014); other surgical history (Left, 01/16/2014); Upper gastrointestinal endoscopy (N/A, 12/23/2023); and Gastrostomy tube placement (N/A, 12/23/2023).     Home Medications:    Prior to Admission medications    Medication Sig Start Date End Date Taking? Authorizing Provider   QUEtiapine (SEROQUEL) 25 MG tablet Take 1 tablet by mouth nightly for 2 doses 12/29/23 2/8/24  Ashley Agustin MD    lansoprazole (PREVACID SOLUTAB) 30 MG disintegrating tablet Take 1 tablet by mouth every morning (before breakfast) 12/30/23 2/8/24  Ashley Agustin MD   albuterol (PROVENTIL) (2.5 MG/3ML) 0.083% nebulizer solution Take 3 mLs by nebulization every 4 hours as needed for Wheezing 12/29/23   Ashley Agustin MD   fluticasone (FLONASE) 50 MCG/ACT nasal spray 2 sprays by Each Nostril route daily 12/30/23   Ashley Agustin MD   ipratropium 0.5 mg-albuterol 2.5 mg (DUONEB) 0.5-2.5 (3) MG/3ML SOLN nebulizer solution Inhale 3 mLs into the lungs every 4 hours as needed for Shortness of Breath 12/29/23   Ashley Agustin MD   sodium chloride (OCEAN) 0.65 % nasal spray 1 spray by Nasal route as needed for Congestion 12/29/23   Ashley Agustin MD   allopurinol (ZYLOPRIM) 100 MG tablet Take 1 tablet by mouth daily    Rubén Tyson MD   warfarin (COUMADIN) 2 MG tablet 1.5 tablets Patient instructed to continue warfarin 3 mg every day. Check INR in 6 week(s). On 4/2023 by [facility]    Rubén Tyson MD   warfarin (COUMADIN) 2 MG tablet  5/2/16   Rubén Tyson MD   metoprolol tartrate (LOPRESSOR) 25 MG tablet Take 0.5 tablets by mouth 2 times daily 6/5/16   Rubén Tyson MD   Adhesive Bandages (PLASTIC BANDAGES 3/4\") MISC  6/15/16   Rubén Tyson MD   warfarin (COUMADIN) 2 MG tablet Take 1 tablet by mouth daily Indications: Monday, Wed, and friday 2mg, Tues, Thurs, and Sat, Sunday 1 mg  Patient taking differently: Take 1.5 tablets by mouth daily 2/3/16 2/8/24  Jaqui Watkins PA-C   atorvastatin (LIPITOR) 20 MG tablet Take 1 tablet by mouth daily 1/14/16   Jaqui Watkins PA-C   Elastic Bandages & Supports (JOBST KNEE HIGH COMPRESSION SM) MISC 1 each by Does not apply route daily as needed  Patient not taking: Reported on 12/10/2023 6/10/15   Jaqui Watkins, PA-C   aspirin 81 MG tablet Take 1 tablet by mouth daily    Provider, MD Rubén        Allergies:  Patient has no known allergies.    Social History:   reports that he has been smoking cigarettes and pipe. He started smoking about 64 years ago. He has never used smokeless tobacco. He reports current alcohol use of about 2.0 standard drinks of alcohol per week. He reports that he does not use drugs.     Family History: family history includes Colon Cancer in his sister; Dementia in his mother; Heart Attack in his brother and father; Heart Disease in his brother and father; High Blood Pressure in his brother. No for premature CAD. No for h/o sudden cardiac death    REVIEW OF SYSTEMS:    Constitutional: there has been no unanticipated weight loss. There's been No change in activity level.     Eyes: No visual changes or diplopia. No scleral icterus.  ENT: No Headaches, hearing loss or vertigo.   Cardiovascular: + chest pain, - dyspnea on exertion, - palpitations or No loss of consciousness.   Respiratory: No cough or wheezing, no sputum production. No hematemesis. No pleuritic chest pain   Gastrointestinal: No abdominal pain, blood in stools.  Genitourinary: No dysuria, trouble voiding, or hematuria.  Musculoskeletal:  No gait disturbance, No weakness or joint complaints.  Neurological: No headache, diplopia, change in muscle strength, numbness or tingling.         PHYSICAL EXAM:    Physical Examination:    BP (!) 167/84   Pulse 65   Temp 98.1 °F (36.7 °C) (Oral)   Resp 18   Ht 1.753 m (5' 9\")   Wt 72.6 kg (160 lb)   SpO2 95%   BMI 23.63 kg/m²    Constitutional and General Appearance: alert, cooperative, in no apparent resp distress HEENT: PERRL, no cervical lymphadenopathy  Respiratory:  Good respiratory effort. No for increased work of breathing.   On auscultation: clear to auscultation bilaterally, no basilar rales, no wheezing   Cardiovascular:  regular S1 and S2.  No murmur audible   No Jugular venous distention, no hepatojugular reflex  Peripheral pulses are symmetrical and full

## 2024-02-15 NOTE — PROGRESS NOTES
[facility]  PROGRESS NOTE    Shift date: 2/14/2024  Shift day: Wednesday   Shift # 2    Room # OBS 21/21-1   Name: Caio Arce                Latter-day: Restorationism   Place of Taoism:     Referral: Routine Visit    Admit Date & Time: 2/14/2024  1:27 PM    Assessment:  Caio Arce is a 83 y.o. male in the hospital for his heart, per patient. Patient had spousal support bedside during visit. Patient appeared coping and hopeful during conversation.      Intervention:  Writer introduced self and title as . Patient engaged in conversation about his health and what led to his hospital visit. Patient also stated he was in the hospital \"for a month\" due to Covid.  provided a supportive presence through active listening and words of affirmation.    Outcome:  Patient continues coping with his health and hospital visit.    Plan:  Chaplains will remain available to offer spiritual and emotional support as needed.      Electronically signed by Chaplain Marcos, on 2/14/2024 at 10:04 PM.  [facility]  [phone]

## 2024-02-15 NOTE — DISCHARGE SUMMARY
CDU Discharge Summary        Patient:  Caio Arce  YOB: 1940    MRN: [id number]   Acct: [id number]    Primary Care Physician: Jaqui Watkins PA-C    Admit date:  2/14/2024  1:27 PM  Discharge date: 2/15/2024  6:20 PM     Discharge Diagnoses:     1.)  Acute chest pain secondary to uncertain etiology.  In proved with IV hydration, analgesics, rest and further ED evaluation by cardiology due to recent admission for COVID-pneumonia and NSTEMI    Follow-up:  Call today/tomorrow for a follow up appointment with Jaqui Watkins PA-C , or return to the Emergency Room with worsening symptoms    Stressed to patient the importance of following up with primary care doctor for further workup/management of symptoms.  Pt verbalizes understanding and agrees with plan.    Discharge Medication Changes:       Medication List        CHANGE how you take these medications      * warfarin 2 MG tablet  Commonly known as: COUMADIN  Take 1 tablet by mouth daily Indications: Monday, Wed, and friday 2mg, Tues, Thurs, and Sat, Sunday 1 mg  What changed: how much to take     * warfarin 2 MG tablet  Commonly known as: COUMADIN  What changed: Another medication with the same name was changed. Make sure you understand how and when to take each.     * warfarin 2 MG tablet  Commonly known as: COUMADIN  What changed: Another medication with the same name was changed. Make sure you understand how and when to take each.           * This list has 3 medication(s) that are the same as other medications prescribed for you. Read the directions carefully, and ask your doctor or other care provider to review them with you.                CONTINUE taking these medications      albuterol (2.5 MG/3ML) 0.083% nebulizer solution  Commonly known as: PROVENTIL  Take 3 mLs by nebulization every 4 hours as needed for Wheezing     allopurinol 100 MG tablet  Commonly known as: ZYLOPRIM     aspirin 81 MG tablet     atorvastatin 20 MG  1.0 METS    Stress Rate Pressure Product 13,776 BPM*mmHg    Stress Target  bpm    Stress Percent HR Achieved 60 %     Nuclear stress test with myocardial perfusion    Result Date: 2/15/2024    ECG: Resting ECG demonstrates AV pacing.   ECG: The ECG was not diagnostic due to paced rhythm.   Stress Test: A pharmacological stress test was performed using lexiscan. PO caffeine given as a reversal agent.           Physical Exam:    General appearance - NAD, AOx 3   Lungs -CTAB, no R/R/R  Heart - RRR, no M/R/G  Abdomen - Soft, NT/ND  Neurological:  MAEx4, No focal motor deficit, sensory loss  Extremities - Cap refil <2 sec in all ext., no edema  Skin -warm, dry      Hospital Course:  Clinical course has improved, labs and imaging reviewed.     Caio Arce originally presented to the hospital on 2/14/2024  1:27 PM with complaints of chest pain that started morning prior to arrival.  At that time it was determined that He required further observation and cardiac evaluation as he was scheduled to have outpatient cardiac workup due to an NSTEMI found during a hospital stay due to COVID-pneumonia. Labs and imaging were followed daily.  Imaging results as above.  He is medically stable to be discharged.  Patient given specific instructions to follow-up with cardiology outpatient in 1 to 2 weeks.  Patient understood he was being discharged prior to final results being available patient also educated to follow-up with PCP within 1 week and to return to the emergency department with any returning or worsening symptoms.      Disposition: Home    Patient stated that they will not drive themselves home from the hospital if they have gotten pain killers/ narcotics earlier that day and that they will arrange for transportation on their own or work with the  for a ride. Patient counseled NOT to drive while under the influence of narcotics/ pain killers.     Condition: Good    Patient stable and ready for

## 2024-02-16 NOTE — PROGRESS NOTES
[facility]  CDU / OBSERVATION ENCOUNTER  ATTENDING NOTE       I performed a history and physical examination of the patient and discussed management with the resident or midlevel provider. I reviewed the resident or midlevel provider's note and agree with the documented findings and plan of care. Any areas of disagreement are noted on the chart. I was personally present for the key portions of any procedures. I have documented in the chart those procedures where I was not present during the key portions. I have reviewed the nurses notes. I agree with the chief complaint, past medical history, past surgical history, allergies, medications, social and family history as documented unless otherwise noted below.    The Family history, social history, and ROS are effectively unchanged since admission unless noted elsewhere in the chart.    This patient was placed in the observation unit for reevaluation for possible admission to the hospital    Patient admitted for reevaluation and cardiology consultation.  Patient seen by cardiology with outpatient workup recommended.  Patient did have further testing done during this admission.  It was felt that he these were more available to him now.  Patient was n.p.o. and we felt we could get testing taken care of.    Patient had testing completed.  Patient was asymptomatic later in the day and while results were not immediately available patient was asking for discharge.  Cardiology is initial plan was for discharge and patient was symptom-free so he was allowed to leave with outpatient follow-up arranged.  Patient was discharged in improved condition.    René Wick MD  Attending Emergency  Physician

## 2024-02-19 LAB
ECHO BSA: 1.88 M2
NUC STRESS EJECTION FRACTION: 50 %
STRESS BASELINE DIAS BP: 99 MMHG
STRESS BASELINE HR: 67 BPM
STRESS BASELINE SYS BP: 168 MMHG
STRESS ESTIMATED WORKLOAD: 1 METS
STRESS PEAK DIAS BP: 99 MMHG
STRESS PEAK SYS BP: 168 MMHG
STRESS PERCENT HR ACHIEVED: 60 %
STRESS POST PEAK HR: 82 BPM
STRESS RATE PRESSURE PRODUCT: NORMAL BPM*MMHG
STRESS TARGET HR: 137 BPM

## 2024-02-27 ENCOUNTER — OFFICE VISIT (OUTPATIENT)
Dept: PULMONOLOGY | Age: 84
End: 2024-02-27
Payer: MEDICARE

## 2024-02-27 VITALS
RESPIRATION RATE: 16 BRPM | HEART RATE: 66 BPM | DIASTOLIC BLOOD PRESSURE: 85 MMHG | BODY MASS INDEX: 24.14 KG/M2 | WEIGHT: 163 LBS | SYSTOLIC BLOOD PRESSURE: 143 MMHG | OXYGEN SATURATION: 97 % | HEIGHT: 69 IN

## 2024-02-27 DIAGNOSIS — J98.4 RESTRICTIVE LUNG DISEASE: ICD-10-CM

## 2024-02-27 DIAGNOSIS — J44.9 CHRONIC OBSTRUCTIVE PULMONARY DISEASE, UNSPECIFIED COPD TYPE (HCC): Primary | ICD-10-CM

## 2024-02-27 DIAGNOSIS — Z86.16 HISTORY OF 2019 NOVEL CORONAVIRUS DISEASE (COVID-19): ICD-10-CM

## 2024-02-27 PROCEDURE — 99215 OFFICE O/P EST HI 40 MIN: CPT | Performed by: INTERNAL MEDICINE

## 2024-02-27 PROCEDURE — 1123F ACP DISCUSS/DSCN MKR DOCD: CPT | Performed by: INTERNAL MEDICINE

## 2024-02-27 PROCEDURE — 94729 DIFFUSING CAPACITY: CPT | Performed by: INTERNAL MEDICINE

## 2024-02-27 PROCEDURE — 94060 EVALUATION OF WHEEZING: CPT | Performed by: INTERNAL MEDICINE

## 2024-02-27 PROCEDURE — 3077F SYST BP >= 140 MM HG: CPT | Performed by: INTERNAL MEDICINE

## 2024-02-27 PROCEDURE — 3079F DIAST BP 80-89 MM HG: CPT | Performed by: INTERNAL MEDICINE

## 2024-02-27 PROCEDURE — 94726 PLETHYSMOGRAPHY LUNG VOLUMES: CPT | Performed by: INTERNAL MEDICINE

## 2024-02-27 RX ORDER — ALBUTEROL SULFATE 90 UG/1
2 AEROSOL, METERED RESPIRATORY (INHALATION) 4 TIMES DAILY PRN
Qty: 18 G | Refills: 5 | Status: SHIPPED | OUTPATIENT
Start: 2024-02-27

## 2024-02-27 NOTE — PROGRESS NOTES
Patient performed PFT with good effort and understanding. 2 puffs Albuterol was given   
Pulmonary function test.  Date of study 02/27/2024.      Spirometry shows FVC is 2.39 68% predicted.  FEV1 is 1.64 60% predicted consistent with moderate obstructive ventilatory impairment.  FEV1 FVC ratio and flow-volume loop is consistent with obstructive ventilatory impairment.  Postbronchodilator there is no significant response to bronchodilator according to ATS criteria.  Lung volumes shows residual volume is 1.56 58% predicted.  Total lung capacity is 4.06 67% corrected consistent with mild to moderate restrictive ventilatory impairment which could be intraparenchymal.  Diffusion capacity is 12.01 53% corrected which is moderately reduced which could be secondary to intraparenchymal lung disease, emphysema, vascular disease or anemia.  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  Reduction in diffusion capacity could be secondary to emphysema or intraparenchymal lung disease pulm vascular disease or anemia.  Clinical collagen is recommended  
shows bilateral lower lobe infiltrates.  Last chest x-ray on 02/14/2024 showed resolution of the pulm infiltrate.  I will start him on his Spiriva once daily albuterol to be used as needed.  I would advise him to continue physical therapy which she is getting at home.  Will get CT scan of the chest in 4 weeks.  Ultimately if he continues to improve he will need pulm function test in 1 year to determine his restrictive impairment as this pulmonary function test because of all recent illnesses minute reflect his true lung function.    Plan and recommendation:    Will schedule CT scan of the chest in 4 weeks.  He will benefit from continued physical therapy.  I would advise to continue speech follow-up.  Advise strict aspiration precaution.  Will start Spiriva Respimat 2 puff once daily  Albuterol to be used as needed  Ultimately will need repeat pulmonary function test likely in 6 months to 1 year.      Vaccinations recommended flu in fall  COVID vaccination recommended  Recommendation given regarding pneumonia vaccine  Up to date with vaccinations from pulm perspective   Maintain an active lifestyle   PFT's reviewed  CXR reviewed  Questions answered pertaining to diagnosis and management explained importance of compliance with therapy     Follow-up in office in 3 months    It was my pleasure to evaluate Caio Arce today.  Please call with questions.    I spent more than 40 minutes today in review of the hospitalization records, review of the data imaging labs other outpatient studies including speech studies and diagnostic testing ordered on management and coordination of care.    Frank Chapman MD, MD             2/27/2024, 11:22 AM

## 2024-03-11 ENCOUNTER — TELEPHONE (OUTPATIENT)
Dept: PULMONOLOGY | Age: 84
End: 2024-03-11

## 2024-03-11 NOTE — TELEPHONE ENCOUNTER
Insurance called, Spiriva is no longer covered by insurance, Incruse is. Do you wish patient to try Incruse? If so please sign the script.

## 2024-03-12 ENCOUNTER — TELEPHONE (OUTPATIENT)
Dept: PULMONOLOGY | Age: 84
End: 2024-03-12

## 2024-03-12 RX ORDER — UMECLIDINIUM 62.5 UG/1
1 AEROSOL, POWDER ORAL DAILY
Qty: 1 EACH | Refills: 11 | Status: SHIPPED | OUTPATIENT
Start: 2024-03-12 | End: 2024-04-11

## 2024-03-12 RX ORDER — UMECLIDINIUM 62.5 UG/1
1 AEROSOL, POWDER ORAL DAILY
Qty: 30 EACH | Refills: 11 | Status: SHIPPED | OUTPATIENT
Start: 2024-03-12

## 2024-03-12 NOTE — TELEPHONE ENCOUNTER
DR. KAT Insurance has denied Spiriva Respimat for this patient because he has not tried formularies.  They will cover Incruse or Atrovent HFA.  Please place a script for one the the covered inhalers or a letter of medical necessity can be dictated and sent for an appeal.

## 2024-03-12 NOTE — TELEPHONE ENCOUNTER
ROSINA LINO (Key: [id number])  Rx #: [id number]  Spiriva Respimat 2.5MCG/ACT aerosol  Form  [employer] Electronic PA Form (2017 NCPDP)  Created  3 days ago  Sent to Plan  15 minutes ago  Plan Response  15 minutes ago  Submit Clinical Questions  14 minutes ago  Determination  Unfavorable  5 minutes ago  Message from Plan  Your request has been denied

## 2024-03-14 ENCOUNTER — HOSPITAL ENCOUNTER (OUTPATIENT)
Dept: CT IMAGING | Age: 84
Discharge: HOME | End: 2024-03-16
Attending: INTERNAL MEDICINE
Payer: MEDICARE

## 2024-03-14 DIAGNOSIS — Z86.16 HISTORY OF 2019 NOVEL CORONAVIRUS DISEASE (COVID-19): ICD-10-CM

## 2024-03-14 DIAGNOSIS — J98.4 RESTRICTIVE LUNG DISEASE: ICD-10-CM

## 2024-03-14 PROCEDURE — 71250 CT THORAX DX C-: CPT

## 2024-03-19 ENCOUNTER — OFFICE VISIT (OUTPATIENT)
Dept: NEUROLOGY | Age: 84
End: 2024-03-19
Payer: MEDICARE

## 2024-03-19 VITALS
WEIGHT: 167 LBS | SYSTOLIC BLOOD PRESSURE: 150 MMHG | HEART RATE: 66 BPM | BODY MASS INDEX: 24.73 KG/M2 | DIASTOLIC BLOOD PRESSURE: 80 MMHG | HEIGHT: 69 IN

## 2024-03-19 DIAGNOSIS — R41.0 TRANSIENT CONFUSION: Primary | ICD-10-CM

## 2024-03-19 DIAGNOSIS — R26.81 GAIT INSTABILITY: ICD-10-CM

## 2024-03-19 DIAGNOSIS — R53.1 RIGHT SIDED WEAKNESS: ICD-10-CM

## 2024-03-19 PROCEDURE — 1123F ACP DISCUSS/DSCN MKR DOCD: CPT

## 2024-03-19 PROCEDURE — 99214 OFFICE O/P EST MOD 30 MIN: CPT

## 2024-03-19 PROCEDURE — 3079F DIAST BP 80-89 MM HG: CPT

## 2024-03-19 PROCEDURE — 3077F SYST BP >= 140 MM HG: CPT

## 2024-03-19 RX ORDER — MECLIZINE HYDROCHLORIDE 25 MG/1
25 TABLET ORAL 3 TIMES DAILY PRN
COMMUNITY
Start: 2022-09-20

## 2024-03-19 ASSESSMENT — ENCOUNTER SYMPTOMS
DIARRHEA: 0
BACK PAIN: 0
NAUSEA: 0
PHOTOPHOBIA: 0
ABDOMINAL DISTENTION: 0
CONSTIPATION: 0
ABDOMINAL PAIN: 0
COLOR CHANGE: 0
SHORTNESS OF BREATH: 0
COUGH: 0
RHINORRHEA: 0

## 2024-03-19 NOTE — PROGRESS NOTES
E.g.  [x] Apple [x] Table [x] Coty     Count backward from 100 by sevens/or spell world backwards:  [x] 93/D   [x] 86/L   [x] 79/R   [x] 72/O   [x] 65/W     Name the three things asked to remember earlier. E.g.  [x] Apple [x] Table [x] Coty     Name objects shown to patient:  [x] Wristwatch   [x] Pen or pencil     Repeat the phrase:  [x] \"No ifs, ands, or buts.\"     Follow these instructions:   [x] \"Take the paper in your right hand,   [x] fold it in half,   [x] and put it on the floor.     Read and follow the instructions:   [x] Close your eyes     Make up and write a sentence about anything, which contains a noun and a verb:   [x] Sentence acceptable     Please copy this picture (intersecting pentagons with a four-sided intersection):   [x] Picture acceptable          MMSE score: 30/30    Method Score Interpretation   Single cutoff <24 Abnormal   Range <21  >25 Increased odds of dementia  Decreased odds of dementia   Education <21  <23  <24 Abnormal for 8th grade eduation  Abnormal for high school education  Abnormal for college education   Severity 0-17  18-23  24-30 Severe cognitive impairment  Mild cognitive impairment  No cognitive impairment             PRIOR TESTS AND IMAGING: Following images and Labs were reviewed by the examiner       Lab Results   Component Value Date    CHOL 145 10/29/2018    TRIG 137 10/29/2018    HDL 47 10/29/2018    LDLCHOLESTEROL 71 10/29/2018    LABA1C 5.2 10/29/2018    TSH 0.08 (L) 12/16/2023           ASSESSMENT       Caio Arce is a 83 y.o. right handed  male with PMH significant for with H/O A fib (on warfarin), HTN, HLD, COPD, BPH, osteoarthritis, idiopathic heart block s/p pacemaker placement (2014), MVP s/p MVR (2014)  was seen in the clinic for as a hospitalization follow up for metabolic encephalopathy due to COVID-pneumonia    Was having generalized weakness after discharge requiring constant physical therapy, has noticed improvement currently is using a

## 2024-03-27 ENCOUNTER — TELEPHONE (OUTPATIENT)
Dept: NEUROLOGY | Age: 84
End: 2024-03-27

## 2024-03-27 NOTE — TELEPHONE ENCOUNTER
Oralia called from [facility] and stated that the order for the EMG since it states \"right arm and leg, would benefit for eval for all limbs\" needs to be adjusted. States it cannot state would benefit from all limbs being evaluated, it needs to be only right arm and right leg or all limbs. Please adjust order. Once adjusted it needs faxed to [phone] with a facesheet and the EMG order attaached

## 2024-03-28 DIAGNOSIS — R26.81 GAIT INSTABILITY: Primary | ICD-10-CM

## 2024-03-28 DIAGNOSIS — R53.1 RIGHT SIDED WEAKNESS: ICD-10-CM

## 2024-06-03 ENCOUNTER — HOSPITAL ENCOUNTER (OUTPATIENT)
Age: 84
Discharge: HOME | End: 2024-06-03
Payer: MEDICARE

## 2024-06-03 LAB
ALBUMIN SERPL-MCNC: 4.6 G/DL (ref 3.5–5.2)
ALP SERPL-CCNC: 112 U/L (ref 40–129)
ALT SERPL-CCNC: 13 U/L (ref 5–41)
ANION GAP SERPL CALCULATED.3IONS-SCNC: 10 MMOL/L (ref 9–17)
AST SERPL-CCNC: 18 U/L
BILIRUB SERPL-MCNC: 0.9 MG/DL (ref 0.3–1.2)
BUN SERPL-MCNC: 22 MG/DL (ref 8–23)
CALCIUM SERPL-MCNC: 9.8 MG/DL (ref 8.6–10.4)
CHLORIDE SERPL-SCNC: 105 MMOL/L (ref 98–107)
CHOLEST SERPL-MCNC: 109 MG/DL
CHOLESTEROL/HDL RATIO: 2
CO2 SERPL-SCNC: 27 MMOL/L (ref 20–31)
CREAT SERPL-MCNC: 0.9 MG/DL (ref 0.7–1.2)
ERYTHROCYTE [DISTWIDTH] IN BLOOD BY AUTOMATED COUNT: 14.5 % (ref 11.5–14.9)
GFR, ESTIMATED: 84 ML/MIN/1.73M2
GLUCOSE SERPL-MCNC: 101 MG/DL (ref 70–99)
HCT VFR BLD AUTO: 46.7 % (ref 41–53)
HDLC SERPL-MCNC: 54 MG/DL
HGB BLD-MCNC: 15.5 G/DL (ref 13.5–17.5)
LDLC SERPL CALC-MCNC: 42 MG/DL (ref 0–130)
MCH RBC QN AUTO: 29.3 PG (ref 26–34)
MCHC RBC AUTO-ENTMCNC: 33.1 G/DL (ref 31–37)
MCV RBC AUTO: 88.5 FL (ref 80–100)
PLATELET # BLD AUTO: 150 K/UL (ref 150–450)
PMV BLD AUTO: 8.5 FL (ref 6–12)
POTASSIUM SERPL-SCNC: 4.5 MMOL/L (ref 3.7–5.3)
PROT SERPL-MCNC: 7.6 G/DL (ref 6.4–8.3)
RBC # BLD AUTO: 5.28 M/UL (ref 4.5–5.9)
SODIUM SERPL-SCNC: 142 MMOL/L (ref 135–144)
TRIGL SERPL-MCNC: 65 MG/DL
URATE SERPL-MCNC: 5.7 MG/DL (ref 3.4–7)
WBC OTHER # BLD: 7.3 K/UL (ref 3.5–11)

## 2024-06-03 PROCEDURE — 85027 COMPLETE CBC AUTOMATED: CPT

## 2024-06-03 PROCEDURE — 36415 COLL VENOUS BLD VENIPUNCTURE: CPT

## 2024-06-03 PROCEDURE — 80053 COMPREHEN METABOLIC PANEL: CPT

## 2024-06-03 PROCEDURE — 83036 HEMOGLOBIN GLYCOSYLATED A1C: CPT

## 2024-06-03 PROCEDURE — 84550 ASSAY OF BLOOD/URIC ACID: CPT

## 2024-06-03 PROCEDURE — 80061 LIPID PANEL: CPT

## 2024-06-07 LAB
EST. AVERAGE GLUCOSE BLD GHB EST-MCNC: 105 MG/DL
HBA1C MFR BLD: 5.3 % (ref 4–6)

## 2024-06-14 ENCOUNTER — OFFICE VISIT (OUTPATIENT)
Dept: PULMONOLOGY | Age: 84
End: 2024-06-14
Payer: MEDICARE

## 2024-06-14 VITALS
OXYGEN SATURATION: 97 % | RESPIRATION RATE: 18 BRPM | WEIGHT: 169 LBS | HEIGHT: 69 IN | DIASTOLIC BLOOD PRESSURE: 85 MMHG | SYSTOLIC BLOOD PRESSURE: 139 MMHG | BODY MASS INDEX: 25.03 KG/M2 | HEART RATE: 62 BPM

## 2024-06-14 DIAGNOSIS — Z86.16 HISTORY OF 2019 NOVEL CORONAVIRUS DISEASE (COVID-19): ICD-10-CM

## 2024-06-14 DIAGNOSIS — J44.9 CHRONIC OBSTRUCTIVE PULMONARY DISEASE, UNSPECIFIED COPD TYPE (HCC): Primary | ICD-10-CM

## 2024-06-14 DIAGNOSIS — J98.4 RESTRICTIVE LUNG DISEASE: ICD-10-CM

## 2024-06-14 PROCEDURE — 3075F SYST BP GE 130 - 139MM HG: CPT | Performed by: INTERNAL MEDICINE

## 2024-06-14 PROCEDURE — 99214 OFFICE O/P EST MOD 30 MIN: CPT | Performed by: INTERNAL MEDICINE

## 2024-06-14 PROCEDURE — 1123F ACP DISCUSS/DSCN MKR DOCD: CPT | Performed by: INTERNAL MEDICINE

## 2024-06-14 PROCEDURE — 3079F DIAST BP 80-89 MM HG: CPT | Performed by: INTERNAL MEDICINE

## 2024-06-14 NOTE — PROGRESS NOTES
consistent with moderate obstruction there is also a concomitant mild restrictive ventilatory impairment present.  He is still recovering from his prolonged illness is still restricted in his walking and dyspnea.  On review of the images of the chest x-ray from 1210/23 when he had left lower lobe infrahilar-perihilar infiltrate and later chest x-ray during his admission shows bilateral lower lobe infiltrates.  Last chest x-ray on 02/14/2024 showed resolution of the pulm infiltrate.  I will start him on his Spiriva once daily albuterol to be used as needed.  I would advise him to continue physical therapy which she is getting at home.    Ultimately if he continues to improve he will need pulm function test in 1 year to determine his restrictive impairment as this pulmonary function test because of all recent illnesses minute reflect his true lung function.    Plan and recommendation:    Will schedule pulmonary function test on next visit  CT scan of the chest images reviewed   He had finished physical therapy.  I would advise to continue speech follow-up if any signs of aspiration.  Advise strict aspiration precaution.  Discussed with patient  Continue Incruse 1 puff once daily  Albuterol to be used as needed      Vaccinations recommended flu in fall  COVID vaccination recommended  Recommendation given regarding pneumonia vaccine  Up to date with vaccinations from pulm perspective   Maintain an active lifestyle   PFT's reviewed  CXR reviewed  Questions answered pertaining to diagnosis and management explained importance of compliance with therapy     Follow-up in office in 6 months    It was my pleasure to evaluate Caio Arce today.  Please call with questions.    I spent more than 40 minutes today in review of the hospitalization records, review of the data imaging labs other outpatient studies including speech studies and diagnostic testing ordered on management and coordination of care.    Frank Chapman MD,

## 2024-07-09 ENCOUNTER — OFFICE VISIT (OUTPATIENT)
Dept: NEUROLOGY | Age: 84
End: 2024-07-09
Payer: MEDICARE

## 2024-07-09 VITALS
HEIGHT: 69 IN | OXYGEN SATURATION: 95 % | BODY MASS INDEX: 24.44 KG/M2 | WEIGHT: 165 LBS | HEART RATE: 62 BPM | DIASTOLIC BLOOD PRESSURE: 87 MMHG | SYSTOLIC BLOOD PRESSURE: 128 MMHG

## 2024-07-09 DIAGNOSIS — R26.81 GAIT INSTABILITY: ICD-10-CM

## 2024-07-09 DIAGNOSIS — R41.0 TRANSIENT CONFUSION: Primary | ICD-10-CM

## 2024-07-09 DIAGNOSIS — R53.82 CHRONIC FATIGUE: ICD-10-CM

## 2024-07-09 DIAGNOSIS — R20.8 DECREASED SENSE OF VIBRATION: ICD-10-CM

## 2024-07-09 PROCEDURE — 3074F SYST BP LT 130 MM HG: CPT

## 2024-07-09 PROCEDURE — 1123F ACP DISCUSS/DSCN MKR DOCD: CPT

## 2024-07-09 PROCEDURE — 3079F DIAST BP 80-89 MM HG: CPT

## 2024-07-09 PROCEDURE — 99214 OFFICE O/P EST MOD 30 MIN: CPT

## 2024-07-09 ASSESSMENT — ENCOUNTER SYMPTOMS
ABDOMINAL DISTENTION: 0
COUGH: 0
NAUSEA: 0
DIARRHEA: 0
COLOR CHANGE: 0
PHOTOPHOBIA: 0
SHORTNESS OF BREATH: 0
RHINORRHEA: 0
ABDOMINAL PAIN: 0
CONSTIPATION: 0
BACK PAIN: 0

## 2024-07-09 NOTE — PROGRESS NOTES
fasciculation     Motor function  Normal muscle bulk and tone  Muscle strength: normal power 5/5  fine motor movements     Sensory function Intact to touch, pin prick, proprioception in bilateral upper and lower extremities.   Decrease sensation to vibration of bl lower extremity up to knee level     Cerebellar Intact fine motor movement. No involuntary movements or tremors     Reflex function Intact 2+ DTR and symmetric. Except for brisk right patellar,  Negative Babinski, negative josias      Gait                  Short steps, normal arm swings, swaying with romberg test, negative retropulsion test            PRIOR TESTS AND IMAGING: Following images and Labs were reviewed by the examiner       Lab Results   Component Value Date    CHOL 109 06/03/2024    TRIG 65 06/03/2024    HDL 54 06/03/2024    LABA1C 5.3 06/03/2024    TSH 0.08 (L) 12/16/2023     Neuropathy work up     Lab Results   Component Value Date    LABA1C 5.3 06/03/2024    TSH 0.08 (L) 12/16/2023    [id number] 12/16/2023    FOLATE 9.0 12/16/2023        ASSESSMENT       Caio Arce is a 84 y.o. right handed  male with PMH significant for with H/O A fib (on warfarin), HTN, HLD, COPD, BPH, osteoarthritis, idiopathic heart block s/p pacemaker placement (2014), MVP s/p MVR (2014)  was seen in the clinic for as a hospitalization follow up for metabolic encephalopathy due to COVID-pneumonia     Was having generalized weakness after discharge requiring constant physical therapy, has noticed improvement currently is using a cane for long distances     No memory or behavioral issues     Mini-Mental status exam was unremarkable    Question for possible long covid, chronic fatigue, erythema of legs, possible autonomic dysregulation. EMG for RLE weakness showed diffuse decrease amplitudes symmetrically.   Tsh was low 7 months ago      PLAN:     1. Transient confusion  2. Chronic fatigue  3. Gait instability  4. Decreased sense of vibration  -

## 2024-07-17 ENCOUNTER — HOSPITAL ENCOUNTER (OUTPATIENT)
Age: 84
Discharge: HOME | End: 2024-07-17
Payer: MEDICARE

## 2024-07-17 DIAGNOSIS — R20.8 DECREASED SENSE OF VIBRATION: ICD-10-CM

## 2024-07-17 DIAGNOSIS — R26.81 GAIT INSTABILITY: ICD-10-CM

## 2024-07-17 DIAGNOSIS — R41.0 TRANSIENT CONFUSION: ICD-10-CM

## 2024-07-17 DIAGNOSIS — R53.82 CHRONIC FATIGUE: ICD-10-CM

## 2024-07-17 LAB
CRP SERPL HS-MCNC: <3 MG/L (ref 0–5)
ERYTHROCYTE [SEDIMENTATION RATE] IN BLOOD BY PHOTOMETRIC METHOD: 1 MM/HR (ref 0–20)
FOLATE SERPL-MCNC: 9.1 NG/ML (ref 4.8–24.2)
T4 FREE SERPL-MCNC: 1.2 NG/DL (ref 0.9–1.7)
TSH SERPL DL<=0.05 MIU/L-ACNC: 1.59 UIU/ML (ref 0.3–5)
VIT B12 SERPL-MCNC: 320 PG/ML (ref 232–1245)

## 2024-07-17 PROCEDURE — 84155 ASSAY OF PROTEIN SERUM: CPT

## 2024-07-17 PROCEDURE — 84156 ASSAY OF PROTEIN URINE: CPT

## 2024-07-17 PROCEDURE — 84166 PROTEIN E-PHORESIS/URINE/CSF: CPT

## 2024-07-17 PROCEDURE — 36415 COLL VENOUS BLD VENIPUNCTURE: CPT

## 2024-07-17 PROCEDURE — 84443 ASSAY THYROID STIM HORMONE: CPT

## 2024-07-17 PROCEDURE — 84439 ASSAY OF FREE THYROXINE: CPT

## 2024-07-17 PROCEDURE — 84165 PROTEIN E-PHORESIS SERUM: CPT

## 2024-07-17 PROCEDURE — 82746 ASSAY OF FOLIC ACID SERUM: CPT

## 2024-07-17 PROCEDURE — 84425 ASSAY OF VITAMIN B-1: CPT

## 2024-07-17 PROCEDURE — 86140 C-REACTIVE PROTEIN: CPT

## 2024-07-17 PROCEDURE — 82607 VITAMIN B-12: CPT

## 2024-07-17 PROCEDURE — 84207 ASSAY OF VITAMIN B-6: CPT

## 2024-07-17 PROCEDURE — 85652 RBC SED RATE AUTOMATED: CPT

## 2024-07-19 LAB
ALBUMIN PERCENT: 62 % (ref 45–65)
ALBUMIN SERPL-MCNC: 4.5 G/DL (ref 3.2–5.2)
ALPHA 2 PERCENT: 9 % (ref 6–13)
ALPHA1 GLOB SERPL ELPH-MCNC: 0.3 G/DL (ref 0.1–0.4)
ALPHA1 GLOB SERPL ELPH-MCNC: 4 % (ref 3–6)
ALPHA2 GLOB SERPL ELPH-MCNC: 0.6 G/DL (ref 0.5–0.9)
B-GLOBULIN SERPL ELPH-MCNC: 0.9 G/DL (ref 0.5–1.1)
B-GLOBULIN SERPL ELPH-MCNC: 12 % (ref 11–19)
GAMMA GLOB SERPL ELPH-MCNC: 0.9 G/DL (ref 0.5–1.5)
GAMMA GLOBULIN %: 13 % (ref 9–20)
P E INTERPRETATION, U: NORMAL
PATHOLOGIST: NORMAL
PATHOLOGIST: NORMAL
PROT PATTERN SERPL ELPH-IMP: NORMAL
PROT SERPL-MCNC: 7.2 G/DL (ref 6.6–8.7)
SPECIMEN TYPE: NORMAL
TOTAL PROT. SUM,%: 100 % (ref 98–102)
TOTAL PROT. SUM: 7.2 G/DL (ref 6.3–8.2)
URINE TOTAL PROTEIN: 32 MG/DL

## 2024-07-20 LAB — PYRIDOXAL PHOS SERPL-SCNC: 19.9 NMOL/L (ref 20–125)

## 2024-07-22 LAB — VIT B1 PYROPHOSHATE BLD-SCNC: 161 NMOL/L (ref 70–180)

## 2024-08-05 DIAGNOSIS — R20.8 DECREASED SENSE OF VIBRATION: Primary | ICD-10-CM

## 2024-08-05 RX ORDER — LANOLIN ALCOHOL/MO/W.PET/CERES
50 CREAM (GRAM) TOPICAL DAILY
Qty: 30 TABLET | Refills: 3 | Status: SHIPPED | OUTPATIENT
Start: 2024-08-05

## 2024-10-07 ENCOUNTER — HOSPITAL ENCOUNTER (OUTPATIENT)
Age: 84
Discharge: HOME | End: 2024-10-07
Payer: MEDICARE

## 2024-10-07 LAB
ALBUMIN SERPL-MCNC: 4.3 G/DL (ref 3.5–5.2)
ALP SERPL-CCNC: 80 U/L (ref 40–129)
ALT SERPL-CCNC: 11 U/L (ref 5–41)
ANION GAP SERPL CALCULATED.3IONS-SCNC: 7 MMOL/L (ref 9–17)
AST SERPL-CCNC: 16 U/L
BILIRUB SERPL-MCNC: 0.8 MG/DL (ref 0.3–1.2)
BUN SERPL-MCNC: 20 MG/DL (ref 8–23)
CALCIUM SERPL-MCNC: 9.3 MG/DL (ref 8.6–10.4)
CHLORIDE SERPL-SCNC: 108 MMOL/L (ref 98–107)
CHOLEST SERPL-MCNC: 114 MG/DL
CHOLESTEROL/HDL RATIO: 2.5
CO2 SERPL-SCNC: 28 MMOL/L (ref 20–31)
CREAT SERPL-MCNC: 0.8 MG/DL (ref 0.7–1.2)
ERYTHROCYTE [DISTWIDTH] IN BLOOD BY AUTOMATED COUNT: 14.9 % (ref 11.5–14.9)
EST. AVERAGE GLUCOSE BLD GHB EST-MCNC: 103 MG/DL
GFR, ESTIMATED: 87 ML/MIN/1.73M2
GLUCOSE SERPL-MCNC: 89 MG/DL (ref 70–99)
HBA1C MFR BLD: 5.2 % (ref 4–6)
HCT VFR BLD AUTO: 46.8 % (ref 41–53)
HDLC SERPL-MCNC: 45 MG/DL
HGB BLD-MCNC: 15.7 G/DL (ref 13.5–17.5)
LDLC SERPL CALC-MCNC: 53 MG/DL (ref 0–130)
MCH RBC QN AUTO: 31.2 PG (ref 26–34)
MCHC RBC AUTO-ENTMCNC: 33.5 G/DL (ref 31–37)
MCV RBC AUTO: 93.2 FL (ref 80–100)
PLATELET # BLD AUTO: 158 K/UL (ref 150–450)
PMV BLD AUTO: 8.4 FL (ref 6–12)
POTASSIUM SERPL-SCNC: 4.6 MMOL/L (ref 3.7–5.3)
PROT SERPL-MCNC: 7.2 G/DL (ref 6.4–8.3)
RBC # BLD AUTO: 5.02 M/UL (ref 4.5–5.9)
SODIUM SERPL-SCNC: 143 MMOL/L (ref 135–144)
TRIGL SERPL-MCNC: 78 MG/DL
URATE SERPL-MCNC: 5.7 MG/DL (ref 3.4–7)
WBC OTHER # BLD: 7.5 K/UL (ref 3.5–11)

## 2024-10-07 PROCEDURE — 85027 COMPLETE CBC AUTOMATED: CPT

## 2024-10-07 PROCEDURE — 80061 LIPID PANEL: CPT

## 2024-10-07 PROCEDURE — 83036 HEMOGLOBIN GLYCOSYLATED A1C: CPT

## 2024-10-07 PROCEDURE — 84550 ASSAY OF BLOOD/URIC ACID: CPT

## 2024-10-07 PROCEDURE — 36415 COLL VENOUS BLD VENIPUNCTURE: CPT

## 2024-10-07 PROCEDURE — 80053 COMPREHEN METABOLIC PANEL: CPT

## 2025-01-08 ENCOUNTER — OFFICE VISIT (OUTPATIENT)
Dept: PULMONOLOGY | Age: 85
End: 2025-01-08

## 2025-01-08 VITALS
HEIGHT: 69 IN | DIASTOLIC BLOOD PRESSURE: 80 MMHG | HEART RATE: 70 BPM | RESPIRATION RATE: 18 BRPM | BODY MASS INDEX: 24.59 KG/M2 | OXYGEN SATURATION: 98 % | SYSTOLIC BLOOD PRESSURE: 132 MMHG | WEIGHT: 166 LBS

## 2025-01-08 DIAGNOSIS — R91.1 LUNG NODULE: ICD-10-CM

## 2025-01-08 DIAGNOSIS — J44.9 CHRONIC OBSTRUCTIVE PULMONARY DISEASE, UNSPECIFIED COPD TYPE (HCC): ICD-10-CM

## 2025-01-08 DIAGNOSIS — Z86.16 HISTORY OF 2019 NOVEL CORONAVIRUS DISEASE (COVID-19): ICD-10-CM

## 2025-01-08 DIAGNOSIS — J98.4 RESTRICTIVE LUNG DISEASE: Primary | ICD-10-CM

## 2025-01-08 RX ORDER — UMECLIDINIUM 62.5 UG/1
1 AEROSOL, POWDER ORAL DAILY
Qty: 30 EACH | Refills: 11 | Status: SHIPPED | OUTPATIENT
Start: 2025-01-08

## 2025-01-08 NOTE — PROGRESS NOTES
Patient had some difficulty with flow loop. 2 puffs Albuterol was given   
Pulmonary function test.  Date of study is 01/08/2025.      Spirometry shows FVC is 2.76 79% predicted.  FEV1 is 2.11 79% predicted FEV1 FEC ratio is normal.  Postbronchodilator there is no significant response to bronchodilator to suggest airway reversibility or airway responsiveness.  Lung volumes shows total lung capacity is 4.34 72% predicted consistent with mild restrictive ventilatory impairment likely intraparenchymal.  Diffusion capacity is 14.34 64% predicted consistent with mild reduction in diffusion capacity which could be secondary to emphysema, pulmonary vascular disease, intraparenchymal lung disease or anemia.      Impression:      This pulmonary function test is consistent with mild intraparenchymal restrictive ventilatory impairment there is mild reduction diffusion capacity which could be secondary to intraparenchymal lung disease, pulm vascular disease, emphysema or anemia.  Clinical correlation is recommended  
Value Ref Range Status   10/07/2024 15.7 13.5 - 17.5 g/dL Final   06/03/2024 15.5 13.5 - 17.5 g/dL Final   02/14/2024 12.6 (L) 13.0 - 17.0 g/dL Final     Platelet Count   Date Value Ref Range Status   05/07/2012 202 140 - 450 k/uL Final     Platelets   Date Value Ref Range Status   10/07/2024 158 150 - 450 k/uL Final   06/03/2024 150 150 - 450 k/uL Final   02/14/2024 175 138 - 453 k/uL Final     BMP:   Sodium   Date Value Ref Range Status   10/07/2024 143 135 - 144 mmol/L Final   06/03/2024 142 135 - 144 mmol/L Final   02/14/2024 141 135 - 144 mmol/L Final     Potassium   Date Value Ref Range Status   10/07/2024 4.6 3.7 - 5.3 mmol/L Final   06/03/2024 4.5 3.7 - 5.3 mmol/L Final   02/14/2024 4.1 3.7 - 5.3 mmol/L Final     Chloride   Date Value Ref Range Status   10/07/2024 108 (H) 98 - 107 mmol/L Final   06/03/2024 105 98 - 107 mmol/L Final   02/14/2024 106 98 - 107 mmol/L Final     CO2   Date Value Ref Range Status   10/07/2024 28 20 - 31 mmol/L Final   06/03/2024 27 20 - 31 mmol/L Final   02/14/2024 26 20 - 31 mmol/L Final     BUN   Date Value Ref Range Status   10/07/2024 20 8 - 23 mg/dL Final   06/03/2024 22 8 - 23 mg/dL Final   02/14/2024 13 8 - 23 mg/dL Final     Creatinine   Date Value Ref Range Status   10/07/2024 0.8 0.7 - 1.2 mg/dL Final   06/03/2024 0.9 0.7 - 1.2 mg/dL Final   02/14/2024 0.5 (L) 0.7 - 1.2 mg/dL Final     Glucose   Date Value Ref Range Status   10/07/2024 89 70 - 99 mg/dL Final   06/03/2024 101 (H) 70 - 99 mg/dL Final   02/14/2024 98 70 - 99 mg/dL Final   05/07/2012 106 74 - 106 mg/dL Final     Comment:     Performed at [facility] [address] [place] [address] [phone]   10/26/2011 80 74 - 106 mg/dL Final     Comment:     Performed at [facility]         INR:   INR   Date Value Ref Range Status   02/15/2024 2.5  Final     Comment:           Therapeutic Range:   Moderate Anticoagulant Intensity: INR = 2.0-3.0   High Anticoagulant Intensity: INR = 2.5-3.5     02/14/2024

## 2025-01-13 NOTE — PROGRESS NOTES
symptoms worsen or develop any new symptoms.         Mr. Arce received counseling on the following healthy behaviors: medical compliance, smoking cessation, blood pressure control, regular follow up with primary doctor.      I have spent 30 minutes face to face with the patient more than 50% of this time was spent counseling and coordinating care.      Electronically signed by Bairon Presley MD on 1/14/2025 at 8:21 PM

## 2025-01-14 ENCOUNTER — OFFICE VISIT (OUTPATIENT)
Dept: NEUROLOGY | Age: 85
End: 2025-01-14
Payer: MEDICARE

## 2025-01-14 VITALS
OXYGEN SATURATION: 97 % | HEIGHT: 69 IN | HEART RATE: 60 BPM | SYSTOLIC BLOOD PRESSURE: 151 MMHG | WEIGHT: 165 LBS | BODY MASS INDEX: 24.44 KG/M2 | DIASTOLIC BLOOD PRESSURE: 84 MMHG

## 2025-01-14 DIAGNOSIS — R26.81 GAIT INSTABILITY: ICD-10-CM

## 2025-01-14 DIAGNOSIS — R41.0 TRANSIENT CONFUSION: Primary | ICD-10-CM

## 2025-01-14 DIAGNOSIS — E53.9 POLYNEUROPATHY DUE TO VITAMIN B6 DEFICIENCY (HCC): ICD-10-CM

## 2025-01-14 DIAGNOSIS — E53.1 VITAMIN B6 DEFICIENCY: ICD-10-CM

## 2025-01-14 DIAGNOSIS — G63 POLYNEUROPATHY DUE TO VITAMIN B6 DEFICIENCY (HCC): ICD-10-CM

## 2025-01-14 DIAGNOSIS — R53.1 RIGHT SIDED WEAKNESS: ICD-10-CM

## 2025-01-14 DIAGNOSIS — R20.8 DECREASED SENSE OF VIBRATION: ICD-10-CM

## 2025-01-14 DIAGNOSIS — U09.9 LONG COVID: ICD-10-CM

## 2025-01-14 PROBLEM — J44.9 CHRONIC OBSTRUCTIVE PULMONARY DISEASE, UNSPECIFIED (HCC): Status: ACTIVE | Noted: 2024-01-08

## 2025-01-14 PROBLEM — I83.90 ASYMPTOMATIC VARICOSE VEINS OF UNSPECIFIED LOWER EXTREMITY: Status: ACTIVE | Noted: 2024-01-08

## 2025-01-14 PROBLEM — N40.0 BENIGN PROSTATIC HYPERPLASIA WITHOUT LOWER URINARY TRACT SYMPTOMS: Status: ACTIVE | Noted: 2024-01-08

## 2025-01-14 PROBLEM — I44.2 ATRIOVENTRICULAR BLOCK, COMPLETE (HCC): Status: ACTIVE | Noted: 2024-01-09

## 2025-01-14 PROBLEM — M19.90 UNSPECIFIED OSTEOARTHRITIS, UNSPECIFIED SITE: Status: ACTIVE | Noted: 2024-01-08

## 2025-01-14 PROBLEM — Z79.01 LONG TERM (CURRENT) USE OF ANTICOAGULANTS: Status: ACTIVE | Noted: 2024-01-08

## 2025-01-14 PROBLEM — K21.9 GASTRO-ESOPHAGEAL REFLUX DISEASE WITHOUT ESOPHAGITIS: Status: ACTIVE | Noted: 2024-01-08

## 2025-01-14 PROBLEM — Z95.2 PRESENCE OF PROSTHETIC HEART VALVE: Status: ACTIVE | Noted: 2024-01-09

## 2025-01-14 PROBLEM — Z11.52 ENCOUNTER FOR SCREENING FOR COVID-19: Status: ACTIVE | Noted: 2024-01-08

## 2025-01-14 PROBLEM — R42 DIZZINESS AND GIDDINESS: Status: ACTIVE | Noted: 2024-01-08

## 2025-01-14 PROBLEM — R19.09 OTHER INTRA-ABDOMINAL AND PELVIC SWELLING, MASS AND LUMP: Status: ACTIVE | Noted: 2024-01-08

## 2025-01-14 PROBLEM — I10 ESSENTIAL (PRIMARY) HYPERTENSION: Status: ACTIVE | Noted: 2024-01-08

## 2025-01-14 PROBLEM — I21.4 NON-ST ELEVATION (NSTEMI) MYOCARDIAL INFARCTION (HCC): Status: ACTIVE | Noted: 2024-01-08

## 2025-01-14 PROBLEM — R73.9 HYPERGLYCEMIA, UNSPECIFIED: Status: ACTIVE | Noted: 2024-01-08

## 2025-01-14 PROBLEM — E78.5 HYPERLIPIDEMIA, UNSPECIFIED: Status: ACTIVE | Noted: 2024-01-08

## 2025-01-14 PROCEDURE — 3079F DIAST BP 80-89 MM HG: CPT

## 2025-01-14 PROCEDURE — 1123F ACP DISCUSS/DSCN MKR DOCD: CPT

## 2025-01-14 PROCEDURE — 3077F SYST BP >= 140 MM HG: CPT

## 2025-01-14 PROCEDURE — 99214 OFFICE O/P EST MOD 30 MIN: CPT

## 2025-01-14 PROCEDURE — 1159F MED LIST DOCD IN RCRD: CPT

## 2025-01-14 RX ORDER — LANOLIN ALCOHOL/MO/W.PET/CERES
25 CREAM (GRAM) TOPICAL DAILY
Qty: 30 TABLET | Refills: 3 | Status: SHIPPED | OUTPATIENT
Start: 2025-01-14

## 2025-01-14 RX ORDER — COLCHICINE 0.6 MG/1
0.6 TABLET ORAL 2 TIMES DAILY PRN
COMMUNITY
Start: 2024-10-22

## 2025-01-14 ASSESSMENT — ENCOUNTER SYMPTOMS
NAUSEA: 0
ABDOMINAL DISTENTION: 0
COLOR CHANGE: 0
COUGH: 0
PHOTOPHOBIA: 0
DIARRHEA: 0
RHINORRHEA: 0
SHORTNESS OF BREATH: 0
CONSTIPATION: 0
BACK PAIN: 0
ABDOMINAL PAIN: 0

## 2025-01-22 ENCOUNTER — HOSPITAL ENCOUNTER (OUTPATIENT)
Dept: CT IMAGING | Age: 85
Discharge: HOME | End: 2025-01-24
Attending: INTERNAL MEDICINE
Payer: MEDICARE

## 2025-01-22 DIAGNOSIS — R91.1 LUNG NODULE: ICD-10-CM

## 2025-01-22 PROCEDURE — 71250 CT THORAX DX C-: CPT

## 2025-02-03 ENCOUNTER — HOSPITAL ENCOUNTER (OUTPATIENT)
Age: 85
Discharge: HOME | End: 2025-02-03
Payer: MEDICARE

## 2025-02-03 LAB
ALBUMIN SERPL-MCNC: 4.3 G/DL (ref 3.5–5.2)
ALP SERPL-CCNC: 85 U/L (ref 40–129)
ALT SERPL-CCNC: 16 U/L (ref 10–50)
ANION GAP SERPL CALCULATED.3IONS-SCNC: 8 MMOL/L (ref 9–16)
AST SERPL-CCNC: 21 U/L (ref 10–50)
BILIRUB SERPL-MCNC: 0.7 MG/DL (ref 0–1.2)
BUN SERPL-MCNC: 24 MG/DL (ref 8–23)
CALCIUM SERPL-MCNC: 9.4 MG/DL (ref 8.6–10.4)
CHLORIDE SERPL-SCNC: 106 MMOL/L (ref 98–107)
CHOLEST SERPL-MCNC: 110 MG/DL (ref 0–199)
CHOLESTEROL/HDL RATIO: 2.5
CO2 SERPL-SCNC: 28 MMOL/L (ref 20–31)
CREAT SERPL-MCNC: 0.9 MG/DL (ref 0.7–1.2)
ERYTHROCYTE [DISTWIDTH] IN BLOOD BY AUTOMATED COUNT: 13.4 % (ref 11.5–14.9)
GFR, ESTIMATED: 84 ML/MIN/1.73M2
GLUCOSE SERPL-MCNC: 95 MG/DL (ref 74–99)
HCT VFR BLD AUTO: 49.8 % (ref 41–53)
HDLC SERPL-MCNC: 44 MG/DL
HGB BLD-MCNC: 16.4 G/DL (ref 13.5–17.5)
LDLC SERPL CALC-MCNC: 49 MG/DL (ref 0–100)
MCH RBC QN AUTO: 31 PG (ref 26–34)
MCHC RBC AUTO-ENTMCNC: 33 G/DL (ref 31–37)
MCV RBC AUTO: 93.8 FL (ref 80–100)
PLATELET # BLD AUTO: 149 K/UL (ref 150–450)
PMV BLD AUTO: 8.4 FL (ref 6–12)
POTASSIUM SERPL-SCNC: 4.7 MMOL/L (ref 3.7–5.3)
PROT SERPL-MCNC: 6.9 G/DL (ref 6.6–8.7)
RBC # BLD AUTO: 5.3 M/UL (ref 4.5–5.9)
SODIUM SERPL-SCNC: 142 MMOL/L (ref 136–145)
TRIGL SERPL-MCNC: 86 MG/DL (ref 0–149)
URATE SERPL-MCNC: 4.5 MG/DL (ref 3.4–7)
WBC OTHER # BLD: 8.1 K/UL (ref 3.5–11)

## 2025-02-03 PROCEDURE — 83036 HEMOGLOBIN GLYCOSYLATED A1C: CPT

## 2025-02-03 PROCEDURE — 80061 LIPID PANEL: CPT

## 2025-02-03 PROCEDURE — 80053 COMPREHEN METABOLIC PANEL: CPT

## 2025-02-03 PROCEDURE — 84550 ASSAY OF BLOOD/URIC ACID: CPT

## 2025-02-03 PROCEDURE — 85027 COMPLETE CBC AUTOMATED: CPT

## 2025-02-03 PROCEDURE — 36415 COLL VENOUS BLD VENIPUNCTURE: CPT

## 2025-02-04 LAB
EST. AVERAGE GLUCOSE BLD GHB EST-MCNC: 103 MG/DL
HBA1C MFR BLD: 5.2 % (ref 4–6)

## 2025-05-27 ENCOUNTER — TELEPHONE (OUTPATIENT)
Dept: NEUROLOGY | Age: 85
End: 2025-05-27

## 2025-05-27 NOTE — TELEPHONE ENCOUNTER
Patient's wife called, they are unable to do the EMG as they do not have a \"magnet to put over pacemaker to complete study.\" Please advise.

## 2025-06-02 ENCOUNTER — HOSPITAL ENCOUNTER (OUTPATIENT)
Age: 85
Discharge: HOME | End: 2025-06-02
Payer: MEDICARE

## 2025-06-02 DIAGNOSIS — E53.1 VITAMIN B6 DEFICIENCY: ICD-10-CM

## 2025-06-02 LAB
ALBUMIN SERPL-MCNC: 4.4 G/DL (ref 3.5–5.2)
ALP SERPL-CCNC: 85 U/L (ref 40–129)
ALT SERPL-CCNC: 18 U/L (ref 10–50)
ANION GAP SERPL CALCULATED.3IONS-SCNC: 8 MMOL/L (ref 9–16)
AST SERPL-CCNC: 25 U/L (ref 10–50)
BILIRUB SERPL-MCNC: 0.8 MG/DL (ref 0–1.2)
BUN SERPL-MCNC: 24 MG/DL (ref 8–23)
CALCIUM SERPL-MCNC: 9.2 MG/DL (ref 8.6–10.4)
CHLORIDE SERPL-SCNC: 105 MMOL/L (ref 98–107)
CHOLEST SERPL-MCNC: 115 MG/DL (ref 0–199)
CHOLESTEROL/HDL RATIO: 2.3
CO2 SERPL-SCNC: 28 MMOL/L (ref 20–31)
CREAT SERPL-MCNC: 0.9 MG/DL (ref 0.7–1.2)
ERYTHROCYTE [DISTWIDTH] IN BLOOD BY AUTOMATED COUNT: 13.3 % (ref 11.5–14.9)
EST. AVERAGE GLUCOSE BLD GHB EST-MCNC: 103 MG/DL
GFR, ESTIMATED: 84 ML/MIN/1.73M2
GLUCOSE SERPL-MCNC: 100 MG/DL (ref 74–99)
HBA1C MFR BLD: 5.2 % (ref 4–6)
HCT VFR BLD AUTO: 46.8 % (ref 41–53)
HDLC SERPL-MCNC: 50 MG/DL
HGB BLD-MCNC: 15.6 G/DL (ref 13.5–17.5)
LDLC SERPL CALC-MCNC: 51 MG/DL (ref 0–100)
MCH RBC QN AUTO: 30.8 PG (ref 26–34)
MCHC RBC AUTO-ENTMCNC: 33.3 G/DL (ref 31–37)
MCV RBC AUTO: 92.3 FL (ref 80–100)
NRBC BLD-RTO: 0 PER 100 WBC
PLATELET # BLD AUTO: 146 K/UL (ref 150–450)
PMV BLD AUTO: 9.8 FL (ref 8–13.5)
POTASSIUM SERPL-SCNC: 4.5 MMOL/L (ref 3.7–5.3)
PROT SERPL-MCNC: 7.1 G/DL (ref 6.6–8.7)
RBC # BLD AUTO: 5.07 M/UL (ref 4.21–5.77)
SODIUM SERPL-SCNC: 141 MMOL/L (ref 136–145)
TRIGL SERPL-MCNC: 72 MG/DL (ref 0–149)
URATE SERPL-MCNC: 5.1 MG/DL (ref 3.4–7)
WBC OTHER # BLD: 7.1 K/UL (ref 3.5–11)

## 2025-06-02 PROCEDURE — 36415 COLL VENOUS BLD VENIPUNCTURE: CPT

## 2025-06-02 PROCEDURE — 84207 ASSAY OF VITAMIN B-6: CPT

## 2025-06-02 PROCEDURE — 83036 HEMOGLOBIN GLYCOSYLATED A1C: CPT

## 2025-06-02 PROCEDURE — 84550 ASSAY OF BLOOD/URIC ACID: CPT

## 2025-06-02 PROCEDURE — 85027 COMPLETE CBC AUTOMATED: CPT

## 2025-06-02 PROCEDURE — 80061 LIPID PANEL: CPT

## 2025-06-02 PROCEDURE — 80053 COMPREHEN METABOLIC PANEL: CPT

## 2025-06-05 ENCOUNTER — TRANSCRIBE ORDERS (OUTPATIENT)
Dept: ADMINISTRATIVE | Age: 85
End: 2025-06-05

## 2025-06-05 DIAGNOSIS — M79.604 RIGHT LEG PAIN: Primary | ICD-10-CM

## 2025-06-05 DIAGNOSIS — M79.605 LEFT LEG PAIN: ICD-10-CM

## 2025-06-05 LAB — PYRIDOXAL PHOS SERPL-SCNC: 252.8 NMOL/L (ref 20–125)

## 2025-06-10 ENCOUNTER — HOSPITAL ENCOUNTER (OUTPATIENT)
Dept: VASCULAR LAB | Age: 85
Discharge: HOME | End: 2025-06-12
Payer: MEDICARE

## 2025-06-10 DIAGNOSIS — M79.604 RIGHT LEG PAIN: ICD-10-CM

## 2025-06-10 DIAGNOSIS — M79.605 LEFT LEG PAIN: ICD-10-CM

## 2025-06-10 PROCEDURE — 93970 EXTREMITY STUDY: CPT | Performed by: SURGERY

## 2025-06-10 PROCEDURE — 93970 EXTREMITY STUDY: CPT

## 2025-07-24 ENCOUNTER — TRANSCRIBE ORDERS (OUTPATIENT)
Dept: ADMISSION | Age: 85
End: 2025-07-24

## 2025-07-24 ENCOUNTER — HOSPITAL ENCOUNTER (OUTPATIENT)
Dept: GENERAL RADIOLOGY | Age: 85
Discharge: HOME | End: 2025-07-26
Payer: MEDICARE

## 2025-07-24 DIAGNOSIS — M79.671 RIGHT FOOT PAIN: ICD-10-CM

## 2025-07-24 DIAGNOSIS — M79.672 LEFT FOOT PAIN: ICD-10-CM

## 2025-07-24 DIAGNOSIS — M79.671 RIGHT FOOT PAIN: Primary | ICD-10-CM

## 2025-07-24 PROCEDURE — 73630 X-RAY EXAM OF FOOT: CPT

## 2025-08-11 ENCOUNTER — HOSPITAL ENCOUNTER (EMERGENCY)
Age: 85
Discharge: HOME | End: 2025-08-12
Attending: EMERGENCY MEDICINE
Payer: MEDICARE

## 2025-08-11 VITALS
TEMPERATURE: 97.5 F | SYSTOLIC BLOOD PRESSURE: 162 MMHG | RESPIRATION RATE: 16 BRPM | HEART RATE: 64 BPM | OXYGEN SATURATION: 96 % | DIASTOLIC BLOOD PRESSURE: 81 MMHG

## 2025-08-11 DIAGNOSIS — R31.9 URINARY TRACT INFECTION WITH HEMATURIA, SITE UNSPECIFIED: Primary | ICD-10-CM

## 2025-08-11 DIAGNOSIS — N39.0 URINARY TRACT INFECTION WITH HEMATURIA, SITE UNSPECIFIED: Primary | ICD-10-CM

## 2025-08-11 LAB
ANION GAP SERPL CALCULATED.3IONS-SCNC: 10 MMOL/L (ref 9–16)
BASOPHILS # BLD: 0.04 K/UL (ref 0–0.2)
BASOPHILS NFR BLD: 1 % (ref 0–2)
BUN SERPL-MCNC: 26 MG/DL (ref 8–23)
CALCIUM SERPL-MCNC: 9.1 MG/DL (ref 8.6–10.4)
CHLORIDE SERPL-SCNC: 109 MMOL/L (ref 98–107)
CO2 SERPL-SCNC: 26 MMOL/L (ref 20–31)
CREAT SERPL-MCNC: 1 MG/DL (ref 0.7–1.2)
EOSINOPHIL # BLD: 0.27 K/UL (ref 0–0.44)
EOSINOPHILS RELATIVE PERCENT: 4 % (ref 1–4)
ERYTHROCYTE [DISTWIDTH] IN BLOOD BY AUTOMATED COUNT: 13.4 % (ref 11.8–14.4)
GFR, ESTIMATED: 74 ML/MIN/1.73M2
GLUCOSE SERPL-MCNC: 95 MG/DL (ref 74–99)
HCT VFR BLD AUTO: 43.7 % (ref 40.7–50.3)
HGB BLD-MCNC: 14.2 G/DL (ref 13–17)
IMM GRANULOCYTES # BLD AUTO: 0.03 K/UL (ref 0–0.3)
IMM GRANULOCYTES NFR BLD: 0 %
INR PPP: 2.4
LYMPHOCYTES NFR BLD: 2.96 K/UL (ref 1.1–3.7)
LYMPHOCYTES RELATIVE PERCENT: 39 % (ref 24–43)
MCH RBC QN AUTO: 30.7 PG (ref 25.2–33.5)
MCHC RBC AUTO-ENTMCNC: 32.5 G/DL (ref 28.4–34.8)
MCV RBC AUTO: 94.4 FL (ref 82.6–102.9)
MONOCYTES NFR BLD: 0.51 K/UL (ref 0.1–1.2)
MONOCYTES NFR BLD: 7 % (ref 3–12)
NEUTROPHILS NFR BLD: 50 % (ref 36–65)
NEUTS SEG NFR BLD: 3.8 K/UL (ref 1.5–8.1)
NRBC BLD-RTO: 0 PER 100 WBC
PLATELET # BLD AUTO: ABNORMAL K/UL (ref 138–453)
PLATELET, FLUORESCENCE: 129 K/UL (ref 138–453)
PLATELETS.RETICULATED NFR BLD AUTO: 2.2 % (ref 1.1–10.3)
POTASSIUM SERPL-SCNC: 3.8 MMOL/L (ref 3.7–5.3)
PROTHROMBIN TIME: 27.4 SEC (ref 11.7–14.9)
RBC # BLD AUTO: 4.63 M/UL (ref 4.21–5.77)
SODIUM SERPL-SCNC: 145 MMOL/L (ref 136–145)
WBC OTHER # BLD: 7.6 K/UL (ref 3.5–11.3)

## 2025-08-11 PROCEDURE — 99285 EMERGENCY DEPT VISIT HI MDM: CPT

## 2025-08-11 PROCEDURE — 85055 RETICULATED PLATELET ASSAY: CPT

## 2025-08-11 PROCEDURE — 80048 BASIC METABOLIC PNL TOTAL CA: CPT

## 2025-08-11 PROCEDURE — 85025 COMPLETE CBC W/AUTO DIFF WBC: CPT

## 2025-08-11 PROCEDURE — 85610 PROTHROMBIN TIME: CPT

## 2025-08-11 PROCEDURE — 81001 URINALYSIS AUTO W/SCOPE: CPT

## 2025-08-12 ENCOUNTER — APPOINTMENT (OUTPATIENT)
Dept: CT IMAGING | Age: 85
End: 2025-08-12
Payer: MEDICARE

## 2025-08-12 LAB
BILIRUB UR QL STRIP: ABNORMAL
CASTS #/AREA URNS LPF: NORMAL /LPF (ref 0–2)
CASTS #/AREA URNS LPF: NORMAL /LPF (ref 0–2)
CLARITY UR: ABNORMAL
COLOR UR: ABNORMAL
EPI CELLS #/AREA URNS HPF: NORMAL /HPF (ref 0–5)
GLUCOSE UR STRIP-MCNC: NEGATIVE MG/DL
HGB UR QL STRIP.AUTO: ABNORMAL
KETONES UR STRIP-MCNC: NEGATIVE MG/DL
LEUKOCYTE ESTERASE UR QL STRIP: ABNORMAL
MICROORGANISM SPEC CULT: NO GROWTH
MUCOUS THREADS URNS QL MICRO: NORMAL
NITRITE UR QL STRIP: POSITIVE
PH UR STRIP: 5 [PH] (ref 5–8)
PROT UR STRIP-MCNC: ABNORMAL MG/DL
RBC #/AREA URNS HPF: NORMAL /HPF (ref 0–2)
SP GR UR STRIP: 1.03 (ref 1–1.03)
SPECIMEN DESCRIPTION: NORMAL
UROBILINOGEN UR STRIP-ACNC: NORMAL EU/DL (ref 0–1)
WBC #/AREA URNS HPF: NORMAL /HPF (ref 0–5)

## 2025-08-12 PROCEDURE — 74177 CT ABD & PELVIS W/CONTRAST: CPT

## 2025-08-12 PROCEDURE — 6370000000 HC RX 637 (ALT 250 FOR IP)

## 2025-08-12 PROCEDURE — 6360000004 HC RX CONTRAST MEDICATION

## 2025-08-12 PROCEDURE — 87086 URINE CULTURE/COLONY COUNT: CPT

## 2025-08-12 RX ORDER — PHENAZOPYRIDINE HYDROCHLORIDE 100 MG/1
200 TABLET, FILM COATED ORAL ONCE
Status: COMPLETED | OUTPATIENT
Start: 2025-08-12 | End: 2025-08-12

## 2025-08-12 RX ORDER — CEPHALEXIN 500 MG/1
500 CAPSULE ORAL ONCE
Status: COMPLETED | OUTPATIENT
Start: 2025-08-12 | End: 2025-08-12

## 2025-08-12 RX ORDER — PHENAZOPYRIDINE HYDROCHLORIDE 200 MG/1
200 TABLET, FILM COATED ORAL 3 TIMES DAILY PRN
Qty: 6 TABLET | Refills: 0 | Status: SHIPPED | OUTPATIENT
Start: 2025-08-12 | End: 2025-08-15

## 2025-08-12 RX ORDER — IOPAMIDOL 755 MG/ML
75 INJECTION, SOLUTION INTRAVASCULAR
Status: COMPLETED | OUTPATIENT
Start: 2025-08-12 | End: 2025-08-12

## 2025-08-12 RX ORDER — CEPHALEXIN 500 MG/1
500 CAPSULE ORAL 4 TIMES DAILY
Qty: 28 CAPSULE | Refills: 0 | Status: SHIPPED | OUTPATIENT
Start: 2025-08-12 | End: 2025-08-19

## 2025-08-12 RX ADMIN — IOPAMIDOL 75 ML: 755 INJECTION, SOLUTION INTRAVENOUS at 00:47

## 2025-08-12 RX ADMIN — PHENAZOPYRIDINE HYDROCHLORIDE 200 MG: 100 TABLET ORAL at 02:49

## 2025-08-12 RX ADMIN — CEPHALEXIN 500 MG: 500 CAPSULE ORAL at 02:49

## 2025-08-13 ENCOUNTER — ANCILLARY PROCEDURE (OUTPATIENT)
Dept: EMERGENCY DEPT | Age: 85
End: 2025-08-13
Attending: EMERGENCY MEDICINE
Payer: MEDICARE

## 2025-08-13 PROCEDURE — 76775 US EXAM ABDO BACK WALL LIM: CPT

## 2025-08-19 ENCOUNTER — OFFICE VISIT (OUTPATIENT)
Dept: NEUROLOGY | Age: 85
End: 2025-08-19
Payer: MEDICARE

## 2025-08-19 VITALS
BODY MASS INDEX: 24.14 KG/M2 | HEIGHT: 69 IN | SYSTOLIC BLOOD PRESSURE: 153 MMHG | WEIGHT: 163 LBS | HEART RATE: 60 BPM | DIASTOLIC BLOOD PRESSURE: 94 MMHG

## 2025-08-19 DIAGNOSIS — G63 POLYNEUROPATHY DUE TO VITAMIN B6 DEFICIENCY: ICD-10-CM

## 2025-08-19 DIAGNOSIS — G62.9 POLYNEUROPATHY: Primary | ICD-10-CM

## 2025-08-19 DIAGNOSIS — E53.9 POLYNEUROPATHY DUE TO VITAMIN B6 DEFICIENCY: ICD-10-CM

## 2025-08-19 DIAGNOSIS — R20.8 DECREASED SENSE OF VIBRATION: ICD-10-CM

## 2025-08-19 PROCEDURE — 3080F DIAST BP >= 90 MM HG: CPT

## 2025-08-19 PROCEDURE — 3077F SYST BP >= 140 MM HG: CPT

## 2025-08-19 PROCEDURE — 99214 OFFICE O/P EST MOD 30 MIN: CPT

## 2025-08-19 PROCEDURE — 1159F MED LIST DOCD IN RCRD: CPT

## 2025-08-19 PROCEDURE — 1125F AMNT PAIN NOTED PAIN PRSNT: CPT

## 2025-08-19 PROCEDURE — 1123F ACP DISCUSS/DSCN MKR DOCD: CPT

## 2025-08-19 RX ORDER — COLCHICINE 0.6 MG/1
0.6 TABLET ORAL 2 TIMES DAILY PRN
COMMUNITY
Start: 2025-07-25

## 2025-08-19 ASSESSMENT — ENCOUNTER SYMPTOMS
ABDOMINAL PAIN: 0
COUGH: 0
APNEA: 0
VOMITING: 0
NAUSEA: 0
SHORTNESS OF BREATH: 0

## (undated) DEVICE — SUTURE MCRYL SZ 4-0 L18IN ABSRB UD L16MM PC-3 3/8 CIR PRIM Y845G

## (undated) DEVICE — GARMENT,MEDLINE,DVT,INT,CALF,MED, GEN2: Brand: MEDLINE

## (undated) DEVICE — SUTURE VCRL SZ 2-0 L27IN ABSRB UD L26MM SH 1/2 CIR J417H

## (undated) DEVICE — KIT PEG 20FR STD PUL EN ACCS DEV ENDOVIVE

## (undated) DEVICE — TUBE FEED GASTROSTOMY MIC 20FR

## (undated) DEVICE — SUTURE PERMAHAND SZ 2-0 L30IN NONABSORBABLE BLK SH L26MM C016D

## (undated) DEVICE — PAD,ABDOMINAL,5"X9",ST,LF,25/BX: Brand: MEDLINE INDUSTRIES, INC.

## (undated) DEVICE — GASTROSTOMY TUBE WITH ENFIT CONNECTOR, 18FR: Brand: GASTROSTOMY TUBE WITH ENFIT CONNECTOR

## (undated) DEVICE — Device

## (undated) DEVICE — SUTURE PDS II SZ 0 L60IN ABSRB VLT L65MM TP-1 1/2 CIR Z991G

## (undated) DEVICE — BINDER ABD UNISX 9IN 62IN L AND XL UNIV

## (undated) DEVICE — GLOVE ORANGE PI 8   MSG9080

## (undated) DEVICE — MIC* SAFETY PERCUTANEOUS ENDOSCOPIC GASTROSTOMY PEG KIT - 20 FR - PULL: Brand: MIC PEG TUBE

## (undated) DEVICE — GLOVE ORANGE PI 7 1/2   MSG9075